# Patient Record
Sex: FEMALE | Race: WHITE | NOT HISPANIC OR LATINO | Employment: FULL TIME | URBAN - NONMETROPOLITAN AREA
[De-identification: names, ages, dates, MRNs, and addresses within clinical notes are randomized per-mention and may not be internally consistent; named-entity substitution may affect disease eponyms.]

---

## 2023-02-09 PROBLEM — Z86.010 PERSONAL HISTORY OF COLONIC POLYPS: Status: ACTIVE | Noted: 2023-02-09

## 2023-02-09 PROBLEM — G89.29 NECK PAIN, CHRONIC: Status: ACTIVE | Noted: 2023-02-09

## 2023-02-09 PROBLEM — M19.90 DJD (DEGENERATIVE JOINT DISEASE): Status: ACTIVE | Noted: 2023-02-09

## 2023-02-09 PROBLEM — R06.83 SNORING: Status: ACTIVE | Noted: 2023-02-09

## 2023-02-09 PROBLEM — G43.909 MIGRAINE: Status: ACTIVE | Noted: 2023-02-09

## 2023-02-09 PROBLEM — L30.9 ECZEMA: Status: ACTIVE | Noted: 2023-02-09

## 2023-02-09 PROBLEM — R69 TAKING MEDICATION FOR CHRONIC DISEASE: Status: ACTIVE | Noted: 2023-02-09

## 2023-02-09 PROBLEM — Z78.0 MENOPAUSE: Status: ACTIVE | Noted: 2023-02-09

## 2023-02-09 PROBLEM — E66.01 CLASS 2 SEVERE OBESITY WITH SERIOUS COMORBIDITY AND BODY MASS INDEX (BMI) OF 39.0 TO 39.9 IN ADULT (MULTI): Status: ACTIVE | Noted: 2023-02-09

## 2023-02-09 PROBLEM — G47.34 NOCTURNAL HYPOXEMIA: Status: ACTIVE | Noted: 2023-02-09

## 2023-02-09 PROBLEM — R73.9 HYPERGLYCEMIA: Status: ACTIVE | Noted: 2023-02-09

## 2023-02-09 PROBLEM — E66.812 CLASS 2 SEVERE OBESITY WITH SERIOUS COMORBIDITY AND BODY MASS INDEX (BMI) OF 39.0 TO 39.9 IN ADULT: Status: ACTIVE | Noted: 2023-02-09

## 2023-02-09 PROBLEM — N92.6 MENSTRUAL IRREGULARITY: Status: ACTIVE | Noted: 2023-02-09

## 2023-02-09 PROBLEM — E78.2 MIXED HYPERLIPIDEMIA: Status: ACTIVE | Noted: 2023-02-09

## 2023-02-09 PROBLEM — F32.0 DEPRESSION, MAJOR, SINGLE EPISODE, MILD (CMS-HCC): Status: ACTIVE | Noted: 2023-02-09

## 2023-02-09 PROBLEM — Z86.0100 PERSONAL HISTORY OF COLONIC POLYPS: Status: ACTIVE | Noted: 2023-02-09

## 2023-02-09 PROBLEM — R53.83 FATIGUE: Status: ACTIVE | Noted: 2023-02-09

## 2023-02-09 PROBLEM — M54.2 NECK PAIN, CHRONIC: Status: ACTIVE | Noted: 2023-02-09

## 2023-02-09 PROBLEM — R74.8 ELEVATED ALKALINE PHOSPHATASE LEVEL: Status: ACTIVE | Noted: 2023-02-09

## 2023-02-09 RX ORDER — SERTRALINE HYDROCHLORIDE 100 MG/1
100 TABLET, FILM COATED ORAL DAILY
COMMUNITY
End: 2023-07-10

## 2023-02-09 RX ORDER — SUMATRIPTAN 50 MG/1
50 TABLET, FILM COATED ORAL
COMMUNITY
End: 2024-02-07 | Stop reason: SDUPTHER

## 2023-02-09 RX ORDER — LEVONORGESTREL AND ETHINYL ESTRADIOL 100-20(84)
KIT ORAL DAILY
COMMUNITY
Start: 2022-04-08 | End: 2024-02-07 | Stop reason: WASHOUT

## 2023-02-09 RX ORDER — ATORVASTATIN CALCIUM 20 MG/1
20 TABLET, FILM COATED ORAL DAILY
COMMUNITY
Start: 2022-08-03 | End: 2023-07-10 | Stop reason: SDUPTHER

## 2023-03-31 LAB
ANION GAP IN SER/PLAS: 10 MMOL/L (ref 10–20)
CALCIUM (MG/DL) IN SER/PLAS: 9.2 MG/DL (ref 8.6–10.3)
CARBON DIOXIDE, TOTAL (MMOL/L) IN SER/PLAS: 27 MMOL/L (ref 21–32)
CHLORIDE (MMOL/L) IN SER/PLAS: 104 MMOL/L (ref 98–107)
CREATININE (MG/DL) IN SER/PLAS: 0.87 MG/DL (ref 0.5–1.05)
ESTIMATED AVERAGE GLUCOSE FOR HBA1C: 111 MG/DL
GFR FEMALE: 79 ML/MIN/1.73M2
GLUCOSE (MG/DL) IN SER/PLAS: 84 MG/DL (ref 74–99)
HEMOGLOBIN A1C/HEMOGLOBIN TOTAL IN BLOOD: 5.5 %
POTASSIUM (MMOL/L) IN SER/PLAS: 4.2 MMOL/L (ref 3.5–5.3)
SODIUM (MMOL/L) IN SER/PLAS: 137 MMOL/L (ref 136–145)
UREA NITROGEN (MG/DL) IN SER/PLAS: 21 MG/DL (ref 6–23)

## 2023-04-05 ENCOUNTER — OFFICE VISIT (OUTPATIENT)
Dept: PRIMARY CARE | Facility: CLINIC | Age: 53
End: 2023-04-05
Payer: COMMERCIAL

## 2023-04-05 VITALS
SYSTOLIC BLOOD PRESSURE: 136 MMHG | OXYGEN SATURATION: 97 % | HEIGHT: 63 IN | WEIGHT: 231 LBS | BODY MASS INDEX: 40.93 KG/M2 | DIASTOLIC BLOOD PRESSURE: 72 MMHG | HEART RATE: 84 BPM

## 2023-04-05 DIAGNOSIS — F32.0 DEPRESSION, MAJOR, SINGLE EPISODE, MILD (CMS-HCC): ICD-10-CM

## 2023-04-05 DIAGNOSIS — E66.01 CLASS 3 SEVERE OBESITY DUE TO EXCESS CALORIES WITHOUT SERIOUS COMORBIDITY WITH BODY MASS INDEX (BMI) OF 40.0 TO 44.9 IN ADULT (MULTI): ICD-10-CM

## 2023-04-05 DIAGNOSIS — Z12.31 ENCOUNTER FOR SCREENING MAMMOGRAM FOR MALIGNANT NEOPLASM OF BREAST: Primary | ICD-10-CM

## 2023-04-05 DIAGNOSIS — R73.9 HYPERGLYCEMIA: ICD-10-CM

## 2023-04-05 DIAGNOSIS — L30.9 ECZEMA, UNSPECIFIED TYPE: ICD-10-CM

## 2023-04-05 PROBLEM — Z78.0 MENOPAUSE: Status: RESOLVED | Noted: 2023-02-09 | Resolved: 2023-04-05

## 2023-04-05 PROBLEM — M19.90 DJD (DEGENERATIVE JOINT DISEASE): Status: RESOLVED | Noted: 2023-02-09 | Resolved: 2023-04-05

## 2023-04-05 PROBLEM — E66.812 CLASS 2 SEVERE OBESITY WITH SERIOUS COMORBIDITY AND BODY MASS INDEX (BMI) OF 39.0 TO 39.9 IN ADULT: Status: RESOLVED | Noted: 2023-02-09 | Resolved: 2023-04-05

## 2023-04-05 PROBLEM — M54.2 NECK PAIN, CHRONIC: Status: RESOLVED | Noted: 2023-02-09 | Resolved: 2023-04-05

## 2023-04-05 PROBLEM — N92.6 MENSTRUAL IRREGULARITY: Status: RESOLVED | Noted: 2023-02-09 | Resolved: 2023-04-05

## 2023-04-05 PROBLEM — R74.8 ELEVATED ALKALINE PHOSPHATASE LEVEL: Status: RESOLVED | Noted: 2023-02-09 | Resolved: 2023-04-05

## 2023-04-05 PROBLEM — G89.29 NECK PAIN, CHRONIC: Status: RESOLVED | Noted: 2023-02-09 | Resolved: 2023-04-05

## 2023-04-05 PROBLEM — E66.813 CLASS 3 SEVERE OBESITY DUE TO EXCESS CALORIES WITHOUT SERIOUS COMORBIDITY WITH BODY MASS INDEX (BMI) OF 40.0 TO 44.9 IN ADULT: Status: ACTIVE | Noted: 2023-02-09

## 2023-04-05 PROCEDURE — 1036F TOBACCO NON-USER: CPT | Performed by: INTERNAL MEDICINE

## 2023-04-05 PROCEDURE — 99214 OFFICE O/P EST MOD 30 MIN: CPT | Performed by: INTERNAL MEDICINE

## 2023-04-05 PROCEDURE — 3008F BODY MASS INDEX DOCD: CPT | Performed by: INTERNAL MEDICINE

## 2023-04-05 RX ORDER — PIMECROLIMUS 10 MG/G
CREAM TOPICAL 2 TIMES DAILY
Qty: 30 G | Refills: 2 | Status: SHIPPED | OUTPATIENT
Start: 2023-04-05 | End: 2023-05-05

## 2023-04-05 ASSESSMENT — ENCOUNTER SYMPTOMS
FATIGUE: 0
COUGH: 0
ARTHRALGIAS: 0
BACK PAIN: 0
ABDOMINAL PAIN: 0
WHEEZING: 0
NAUSEA: 0
BLOOD IN STOOL: 0
DIARRHEA: 0
CHEST TIGHTNESS: 0
SHORTNESS OF BREATH: 0
PALPITATIONS: 0
VOMITING: 0

## 2023-04-05 NOTE — ASSESSMENT & PLAN NOTE
-She will work onhealthy diet with weight loss and I have specifically recommended weight watchers.

## 2023-04-05 NOTE — ASSESSMENT & PLAN NOTE
-Her last colonoscopy was performed on 4- and she did have a couple of polyps.  She will definitely need to have a follow-up colonoscopy next year

## 2023-04-05 NOTE — PATIENT INSTRUCTIONS
-Overall I am very pleased with your laboratory test results today  -Please remember to send us a copy of your recent comprehensive lab profile and we will put it in your file  -We are going to help you get your screening mammogram scheduled  -Please call me if the cream I sent in today is too expensive or if it is ineffective  -Please try to check your blood pressure at home when you have the opportunity to sit and relax and give us an update after several weeks.  We generally recommend the blood pressure monitor called Omron and we like the arm cuffs as opposed to the wrist cuffs  -If everything goes according to plan I will see you back in 6 months for reevaluation and please remember to get your lab work done just prior to that visit.  I will not be ordering a cholesterol profile since you had it recently

## 2023-04-05 NOTE — PROGRESS NOTES
"Subjective   Patient ID: Rebecca Friedman is a 52 y.o. female who presents for routine 6 mo check up; lab and med review; c/o eczema and OTC creams are not working.    HPI    Review of Systems    Objective   Vitals:    04/05/23 0707   BP: 142/86   BP Location: Right arm   Pulse: 84   SpO2: 97%   Weight: 105 kg (231 lb)   Height: 1.6 m (5' 3\")      Physical Exam    Assessment/Plan   There are no diagnoses linked to this encounter.      "

## 2023-04-05 NOTE — ASSESSMENT & PLAN NOTE
-I have prescribed a topical cream called Elidel.  She will let me know if its not affordable and also let us know if its effective.  -We did discuss a hypoallergenic regimen which includes avoiding fabric softeners and dryer sheets.  Using hypoallergenic detergent such as Dreft and doing the double rinse cycle.  Also avoiding lotions or perfumes.  Also using mild soap such as Dove sensitive soap

## 2023-04-05 NOTE — ASSESSMENT & PLAN NOTE
-Her most recent blood sugar is normal with a hemoglobin A1c of 5.5.  We will continue to check periodically

## 2023-04-05 NOTE — PROGRESS NOTES
Subjective   Patient ID: Rebecca Friedman is a 52 y.o. female who presents for No chief complaint on file..  HPI  She is here today for her 6-month checkup.  She is looking well and reports feeling well.  When she arrived her blood pressure was a bit generous but after sitting for a while it did come down significantly.  We talked about doing some ambulatory monitoring to make sure that her blood pressure is under relatively good control.  She also suffers with eczema and has had it for many years.  She has tried various over the counter topical agents such as hydrocortisone but it does not seem to help.  Her condition comes and goes.  We talked about conducting a hypoallergenic regimen and I will give her a prescription cream today.  She will let us know if is not affordable or if its not effective.  She also states she is doing relatively well with her sertraline although she is having a lot of stress recently because of a new job position.  We also went over the results of recent lab work and overall I am very pleased with her numbers.  She states she had a comprehensive lab panel from her workplace recently and her cholesterol was very good at that time.  She has agreed to give us a copy and again we will check her cholesterol periodically.  We also discussed her history of colon polyps.  Her last colonoscopy was in 2019 and she will definitely need to have a colonoscopy next year as part of her surveillance.  She is also agreeable to getting her mammogram scheduled.  Overall she appears to be doing well and she will be working on healthy diet with weight loss.  Review of Systems   Constitutional:  Negative for fatigue.   Respiratory:  Negative for cough, chest tightness, shortness of breath and wheezing.    Cardiovascular:  Negative for chest pain, palpitations and leg swelling.   Gastrointestinal:  Negative for abdominal pain, blood in stool, diarrhea, nausea and vomiting.   Musculoskeletal:  Negative for  arthralgias and back pain.   Objective   Physical Exam  Vitals and nursing note reviewed.   Constitutional:       General: She is not in acute distress.     Appearance: Normal appearance.   HENT:      Head: Normocephalic and atraumatic.   Eyes:      Conjunctiva/sclera: Conjunctivae normal.   Cardiovascular:      Rate and Rhythm: Normal rate and regular rhythm.      Heart sounds: Normal heart sounds.   Pulmonary:      Effort: No respiratory distress.      Breath sounds: No wheezing.   Abdominal:      Palpations: Abdomen is soft.      Tenderness: There is no abdominal tenderness. There is no guarding.   Musculoskeletal:         General: No swelling. Normal range of motion.   Skin:     General: Skin is warm and dry.   Neurological:      General: No focal deficit present.      Mental Status: She is alert and oriented to person, place, and time.   Psychiatric:         Behavior: Behavior normal.       Recent Results (from the past 672 hour(s))   Hemoglobin A1C    Collection Time: 03/31/23  7:05 AM   Result Value Ref Range    Hemoglobin A1C 5.5 %    Estimated Average Glucose 111 MG/DL   Basic Metabolic Panel    Collection Time: 03/31/23  7:05 AM   Result Value Ref Range    Glucose 84 74 - 99 mg/dL    Sodium 137 136 - 145 mmol/L    Potassium 4.2 3.5 - 5.3 mmol/L    Chloride 104 98 - 107 mmol/L    Bicarbonate 27 21 - 32 mmol/L    Anion Gap 10 10 - 20 mmol/L    Urea Nitrogen 21 6 - 23 mg/dL    Creatinine 0.87 0.50 - 1.05 mg/dL    GFR Female 79 >90 mL/min/1.73m2    Calcium 9.2 8.6 - 10.3 mg/dL       Assessment/Plan   Problem List Items Addressed This Visit          Endocrine/Metabolic    Class 3 severe obesity due to excess calories without serious comorbidity with body mass index (BMI) of 40.0 to 44.9 in adult (CMS/[facility])     -She will work onhealthy diet with weight loss and I have specifically recommended weight watchers.         Relevant Orders    Follow Up In Primary Care       Infectious/Inflammatory    Eczema     -I have  prescribed a topical cream called Elidel.  She will let me know if its not affordable and also let us know if its effective.  -We did discuss a hypoallergenic regimen which includes avoiding fabric softeners and dryer sheets.  Using hypoallergenic detergent such as Dreft and doing the double rinse cycle.  Also avoiding lotions or perfumes.  Also using mild soap such as Dove sensitive soap         Relevant Medications    pimecrolimus (Elidel) 1 % cream    Other Relevant Orders    Follow Up In Primary Care       Other    Depression, major, single episode, mild (CMS/HCC)     -Doing well on her sertraline 100 mg daily         Relevant Orders    Follow Up In Primary Care    Hyperglycemia     -Her most recent blood sugar is normal with a hemoglobin A1c of 5.5.  We will continue to check periodically         Relevant Orders    Follow Up In Primary Care    Basic Metabolic Panel    Hemoglobin A1c     Other Visit Diagnoses       Encounter for screening mammogram for malignant neoplasm of breast    -  Primary    Relevant Orders    BI mammo bilateral screening tomosynthesis               Sandy Huff, DO

## 2023-04-05 NOTE — ASSESSMENT & PLAN NOTE
-She will give us a copy of her most recent comprehensive lab panel through the workplace which includes cholesterol  -She will continue with atorvastatin 20 mg daily

## 2023-05-16 LAB
FOLLITROPIN (IU/L) IN SER/PLAS: 34.8 IU/L
LUTEINIZING HORMONE (IU/ML) IN SER/PLAS: 25.6 IU/L

## 2023-05-19 LAB — GENITAL CULTURE: NORMAL

## 2023-06-07 ENCOUNTER — LAB (OUTPATIENT)
Dept: LAB | Facility: LAB | Age: 53
End: 2023-06-07
Payer: COMMERCIAL

## 2023-06-07 ENCOUNTER — OFFICE VISIT (OUTPATIENT)
Dept: PRIMARY CARE | Facility: CLINIC | Age: 53
End: 2023-06-07
Payer: COMMERCIAL

## 2023-06-07 VITALS
WEIGHT: 231 LBS | DIASTOLIC BLOOD PRESSURE: 80 MMHG | HEART RATE: 100 BPM | BODY MASS INDEX: 40.93 KG/M2 | HEIGHT: 63 IN | OXYGEN SATURATION: 98 % | SYSTOLIC BLOOD PRESSURE: 126 MMHG

## 2023-06-07 DIAGNOSIS — F41.9 ANXIETY: ICD-10-CM

## 2023-06-07 DIAGNOSIS — F32.0 DEPRESSION, MAJOR, SINGLE EPISODE, MILD (CMS-HCC): Primary | ICD-10-CM

## 2023-06-07 LAB — THYROTROPIN (MIU/L) IN SER/PLAS BY DETECTION LIMIT <= 0.05 MIU/L: 3.85 MIU/L (ref 0.44–3.98)

## 2023-06-07 PROCEDURE — 99213 OFFICE O/P EST LOW 20 MIN: CPT | Performed by: INTERNAL MEDICINE

## 2023-06-07 PROCEDURE — 3008F BODY MASS INDEX DOCD: CPT | Performed by: INTERNAL MEDICINE

## 2023-06-07 PROCEDURE — 36415 COLL VENOUS BLD VENIPUNCTURE: CPT

## 2023-06-07 PROCEDURE — 84443 ASSAY THYROID STIM HORMONE: CPT

## 2023-06-07 PROCEDURE — 1036F TOBACCO NON-USER: CPT | Performed by: INTERNAL MEDICINE

## 2023-06-07 RX ORDER — ARIPIPRAZOLE 2 MG/1
2 TABLET ORAL DAILY
Qty: 30 TABLET | Refills: 0 | Status: SHIPPED | OUTPATIENT
Start: 2023-06-07 | End: 2023-07-10 | Stop reason: SDUPTHER

## 2023-06-07 RX ORDER — HYDROXYZINE HYDROCHLORIDE 25 MG/1
25 TABLET, FILM COATED ORAL 3 TIMES DAILY
Qty: 30 TABLET | Refills: 2 | Status: SHIPPED | OUTPATIENT
Start: 2023-06-07 | End: 2024-02-07 | Stop reason: WASHOUT

## 2023-06-07 ASSESSMENT — ENCOUNTER SYMPTOMS
PALPITATIONS: 1
SHORTNESS OF BREATH: 0
BLOOD IN STOOL: 0
FATIGUE: 0
DIARRHEA: 0
WHEEZING: 0
VOMITING: 0
NAUSEA: 0
ABDOMINAL PAIN: 0
COUGH: 0
CHEST TIGHTNESS: 0

## 2023-06-07 NOTE — PROGRESS NOTES
Subjective   Patient ID: Rebecca Friedman is a 53 y.o. female who presents for No chief complaint on file..  HPI  She is here today to discuss some increased stress in her life as of recent.  Patient states she is having a lot of stress at work and even some stress at home.  She has been feeling more depressed and also experiencing some anxiety.  She states she has not had a full-blown panic attack but sometimes she is feeling anxious and experiences palpitations.  She states she is working on some conflict resolution at work and she feels optimistic that things will change but again she is just not feeling well.  She states she has an appointment to get into  a counselor that is covered through her insurance plan but it is going to take a long time to get in because of their heavy schedule.  I did suggest that she possibly talk to her insurance company about this issue and they might allow her to see somebody different.  In the meantime we discussed her Zoloft which she has been taking for the past 15 years.  She has been on 200 mg for a very long time.  We discussed adding a medication called Abilify as often times this helps with the efficacy of her current antidepressant and may allow us not to have to switch medicines.  We also talked about the anxiety and I will be giving her hydroxyzine to take.  We talked about side effects and she will try a dose when she is at home away from work to see how she responds to it.  We also talked about checking a thyroid blood test just to make sure there is no abnormalities.  We will plan on seeing her back in 3 to 4 weeks for follow-up and of course she will call sooner if things or not going well.  Review of Systems   Constitutional:  Negative for fatigue.   Respiratory:  Negative for cough, chest tightness, shortness of breath and wheezing.    Cardiovascular:  Positive for palpitations. Negative for chest pain and leg swelling.   Gastrointestinal:  Negative for  abdominal pain, blood in stool, diarrhea, nausea and vomiting.     Objective   Physical Exam  Vitals and nursing note reviewed.   Constitutional:       General: She is not in acute distress.     Appearance: Normal appearance.   HENT:      Head: Normocephalic and atraumatic.   Eyes:      Conjunctiva/sclera: Conjunctivae normal.   Cardiovascular:      Rate and Rhythm: Normal rate and regular rhythm.      Heart sounds: Normal heart sounds.   Pulmonary:      Effort: No respiratory distress.      Breath sounds: No wheezing.   Abdominal:      Palpations: Abdomen is soft.      Tenderness: There is no abdominal tenderness. There is no guarding.   Musculoskeletal:         General: No swelling. Normal range of motion.   Skin:     General: Skin is warm and dry.   Neurological:      General: No focal deficit present.      Mental Status: She is alert and oriented to person, place, and time.   Psychiatric:         Behavior: Behavior normal.       Assessment/Plan   Problem List Items Addressed This Visit          Other    Depression, major, single episode, mild (CMS/HCC) - Primary     -She will continue with sertraline 200 mg daily (she has been on this dose for the past 15 years)  -I am adding Abilify 2 mg 1 tablet daily  -We will check a TSH and I will contact her with results when it comes back  -I would like to see her back in 3 to 4 weeks for follow-up         Relevant Medications    ARIPiprazole (Abilify) 2 mg tablet    Anxiety     -She understands that often times depression and anxiety go hand-in-hand so hopefully by improving the depression and anxiety will improve  -She will try to get into a counselor as quickly as possible  -I am giving her hydroxyzine to use as needed for anxiety         Relevant Medications    hydrOXYzine HCL (Atarax) 25 mg tablet    Other Relevant Orders    TSH          Sandy Huff, DO

## 2023-06-07 NOTE — ASSESSMENT & PLAN NOTE
-She will continue with sertraline 200 mg daily (she has been on this dose for the past 15 years)  -I am adding Abilify 2 mg 1 tablet daily  -We will check a TSH and I will contact her with results when it comes back  -I would like to see her back in 3 to 4 weeks for follow-up

## 2023-06-07 NOTE — PATIENT INSTRUCTIONS
As we discussed you will get your thyroid blood test drawn today before leaving and we will contact you with results  I sent 2 prescriptions to your pharmacy.  1 is called Abilify to be taken once a day with your Zoloft.  This medication has been shown to help with depression and can often times provide significant relief  The hydroxyzine is for anxiety and will be taken only as needed.  I suggest you try a dose while at home to see how you respond as far as side effects are concerned  I am very pleased that you are trying to get into the counselor and you might want to call insurance about the issue with getting an appointment

## 2023-06-07 NOTE — ASSESSMENT & PLAN NOTE
-She understands that often times depression and anxiety go hand-in-hand so hopefully by improving the depression and anxiety will improve  -She will try to get into a counselor as quickly as possible  -I am giving her hydroxyzine to use as needed for anxiety

## 2023-06-26 DIAGNOSIS — E78.2 MIXED HYPERLIPIDEMIA: ICD-10-CM

## 2023-06-26 DIAGNOSIS — F32.0 DEPRESSION, MAJOR, SINGLE EPISODE, MILD (CMS-HCC): ICD-10-CM

## 2023-06-26 DIAGNOSIS — F41.9 ANXIETY: Primary | ICD-10-CM

## 2023-07-03 ENCOUNTER — APPOINTMENT (OUTPATIENT)
Dept: PRIMARY CARE | Facility: CLINIC | Age: 53
End: 2023-07-03
Payer: COMMERCIAL

## 2023-07-10 RX ORDER — ARIPIPRAZOLE 2 MG/1
2 TABLET ORAL DAILY
Qty: 30 TABLET | Refills: 0 | Status: SHIPPED | OUTPATIENT
Start: 2023-07-10 | End: 2023-08-09 | Stop reason: SDUPTHER

## 2023-07-10 RX ORDER — SERTRALINE HYDROCHLORIDE 100 MG/1
TABLET, FILM COATED ORAL
Qty: 60 TABLET | Refills: 0 | Status: SHIPPED | OUTPATIENT
Start: 2023-07-10 | End: 2024-02-07 | Stop reason: SDUPTHER

## 2023-07-10 RX ORDER — ATORVASTATIN CALCIUM 20 MG/1
20 TABLET, FILM COATED ORAL DAILY
Qty: 30 TABLET | Refills: 0 | Status: SHIPPED | OUTPATIENT
Start: 2023-07-10 | End: 2023-08-09 | Stop reason: SDUPTHER

## 2023-07-10 NOTE — TELEPHONE ENCOUNTER
PT IS OUT OF TOWN FOR FAMILY EMERGENCY AND NEEDS HER MEDICATIONS. SHE WILL SCHEDULE APPOINTMENT WHEN SHE RETURNS   show

## 2023-07-11 ENCOUNTER — APPOINTMENT (OUTPATIENT)
Dept: PRIMARY CARE | Facility: CLINIC | Age: 53
End: 2023-07-11
Payer: COMMERCIAL

## 2023-08-09 ENCOUNTER — OFFICE VISIT (OUTPATIENT)
Dept: PRIMARY CARE | Facility: CLINIC | Age: 53
End: 2023-08-09
Payer: COMMERCIAL

## 2023-08-09 VITALS
DIASTOLIC BLOOD PRESSURE: 72 MMHG | BODY MASS INDEX: 42.17 KG/M2 | HEIGHT: 63 IN | HEART RATE: 74 BPM | WEIGHT: 238 LBS | SYSTOLIC BLOOD PRESSURE: 124 MMHG

## 2023-08-09 DIAGNOSIS — E78.2 MIXED HYPERLIPIDEMIA: ICD-10-CM

## 2023-08-09 DIAGNOSIS — F32.0 DEPRESSION, MAJOR, SINGLE EPISODE, MILD (CMS-HCC): ICD-10-CM

## 2023-08-09 DIAGNOSIS — R73.9 HYPERGLYCEMIA: Primary | ICD-10-CM

## 2023-08-09 DIAGNOSIS — N95.0 POSTMENOPAUSAL BLEEDING: ICD-10-CM

## 2023-08-09 PROBLEM — R53.83 FATIGUE: Status: RESOLVED | Noted: 2023-02-09 | Resolved: 2023-08-09

## 2023-08-09 PROCEDURE — 3008F BODY MASS INDEX DOCD: CPT | Performed by: INTERNAL MEDICINE

## 2023-08-09 PROCEDURE — 99213 OFFICE O/P EST LOW 20 MIN: CPT | Performed by: INTERNAL MEDICINE

## 2023-08-09 PROCEDURE — 1036F TOBACCO NON-USER: CPT | Performed by: INTERNAL MEDICINE

## 2023-08-09 RX ORDER — ATORVASTATIN CALCIUM 20 MG/1
20 TABLET, FILM COATED ORAL DAILY
Qty: 90 TABLET | Refills: 1 | Status: SHIPPED | OUTPATIENT
Start: 2023-08-09 | End: 2023-08-09 | Stop reason: SDUPTHER

## 2023-08-09 RX ORDER — ARIPIPRAZOLE 2 MG/1
2 TABLET ORAL DAILY
Qty: 90 TABLET | Refills: 1 | Status: SHIPPED | OUTPATIENT
Start: 2023-08-09 | End: 2024-01-30

## 2023-08-09 RX ORDER — ATORVASTATIN CALCIUM 20 MG/1
20 TABLET, FILM COATED ORAL DAILY
Qty: 90 TABLET | Refills: 1 | Status: SHIPPED | OUTPATIENT
Start: 2023-08-09 | End: 2024-01-23

## 2023-08-09 RX ORDER — ARIPIPRAZOLE 2 MG/1
2 TABLET ORAL DAILY
Qty: 30 TABLET | Refills: 0 | Status: SHIPPED | OUTPATIENT
Start: 2023-08-09 | End: 2023-08-09 | Stop reason: SDUPTHER

## 2023-08-09 ASSESSMENT — ENCOUNTER SYMPTOMS
ABDOMINAL PAIN: 0
VOMITING: 0
WHEEZING: 0
NAUSEA: 0
SHORTNESS OF BREATH: 0
BLOOD IN STOOL: 0
ARTHRALGIAS: 0
PALPITATIONS: 0
FATIGUE: 0
BACK PAIN: 0
COUGH: 0
DIARRHEA: 0

## 2023-08-09 NOTE — PROGRESS NOTES
Subjective   Patient ID: Rebecca Friedman is a 53 y.o. female who presents for Follow-up (Med refill).  HPI  She is here today for follow-up.  We last saw her on June 7 and she was having a lot of stress at that time.  We added Abilify to her daily regimen and she is back today for follow-up.  She states life got pretty chaotic because her father was very ill and was out of state.  She states she is doing better and she has signs a 60% improvement from when we saw her last time.  She is also going to counseling and will be going to her second session.  We will continue with her current medical regimen and we are providing refills today.  We also briefly discussed her thyroid blood test which came back entirely normal.  She also explains that she has been in menopause for a little over a year and has oddly experienced 2 menstrual cycles.  I told her that sometimes this can happen however we need to investigate and make sure it is not something more serious.  I am going to order a transvaginal ultrasound and explained this to her.  She will call make an appoint with Dr. Live for a follow-up.  If everything goes according to plan we will see her back in approximately 6 months and she knows to get fasting lab work done prior to that visit  Review of Systems   Constitutional:  Negative for fatigue.   Respiratory:  Negative for cough, shortness of breath and wheezing.    Cardiovascular:  Negative for chest pain, palpitations and leg swelling.   Gastrointestinal:  Negative for abdominal pain, blood in stool, diarrhea, nausea and vomiting.   Musculoskeletal:  Negative for arthralgias and back pain.     Objective   Physical Exam  Vitals and nursing note reviewed.   Constitutional:       General: She is not in acute distress.     Appearance: Normal appearance.   HENT:      Head: Normocephalic and atraumatic.   Eyes:      Conjunctiva/sclera: Conjunctivae normal.   Cardiovascular:      Rate and Rhythm: Normal rate and  regular rhythm.      Heart sounds: Normal heart sounds.   Pulmonary:      Effort: No respiratory distress.      Breath sounds: No wheezing.   Abdominal:      Palpations: Abdomen is soft.      Tenderness: There is no abdominal tenderness. There is no guarding.   Musculoskeletal:         General: No swelling. Normal range of motion.   Skin:     General: Skin is warm and dry.   Neurological:      General: No focal deficit present.      Mental Status: She is alert and oriented to person, place, and time.   Psychiatric:         Behavior: Behavior normal.       Assessment/Plan   Problem List Items Addressed This Visit       Depression, major, single episode, mild (CMS/HCC)     -Doing much better  -We will continue with counseling  -We will continue with sertraline 100 mg 2 tablets daily  -We will continue with Abilify 2 mg daily         Relevant Medications    ARIPiprazole (Abilify) 2 mg tablet    Hyperglycemia - Primary    Relevant Orders    Basic Metabolic Panel    Hemoglobin A1C    Mixed hyperlipidemia    Relevant Medications    atorvastatin (Lipitor) 20 mg tablet    Other Relevant Orders    Lipid panel    Postmenopausal bleeding     -We will order a transvaginal ultrasound and we will contact her with the results  -She has agreed to make an appoint with Dr. Live for follow-up         Relevant Orders    US pelvis transvaginal          Sandy Huff DO

## 2023-08-09 NOTE — PATIENT INSTRUCTIONS
As we discussed the staff will be helping you to schedule a transvaginal ultrasound.  This will allow us to see your uterus and make sure that it is looking normal.  Even if this test comes back normal you still need to see Dr. Live because sometimes ultrasound can miss some minor issues  We have provided refills on medications and if everything goes according to plan we would like to see you back in approximately 6 months.  Just prior to that visit we will be checking your blood sugar and your cholesterol so please remember to go fasting for that lab work prior to your visit  Please also remember to get the season's flu vaccine in the fall

## 2023-08-09 NOTE — ASSESSMENT & PLAN NOTE
-Doing much better  -We will continue with counseling  -We will continue with sertraline 100 mg 2 tablets daily  -We will continue with Abilify 2 mg daily

## 2023-08-09 NOTE — ASSESSMENT & PLAN NOTE
-We will order a transvaginal ultrasound and we will contact her with the results  -She has agreed to make an appoint with Dr. Live for follow-up

## 2023-08-14 NOTE — RESULT ENCOUNTER NOTE
Just an FYI  I called her tonight to let her know that the ultrasound did show a borderline thickened endometrial canal and the good news is she has an appointment with gynecology on Friday for her postmenopausal bleeding.

## 2023-08-30 LAB
FOLLITROPIN (IU/L) IN SER/PLAS: 28.8 IU/L
LUTEINIZING HORMONE (IU/ML) IN SER/PLAS: 20.6 IU/L

## 2024-01-04 ENCOUNTER — OFFICE VISIT (OUTPATIENT)
Dept: PRIMARY CARE | Facility: CLINIC | Age: 54
End: 2024-01-04
Payer: COMMERCIAL

## 2024-01-04 ENCOUNTER — ANCILLARY PROCEDURE (OUTPATIENT)
Dept: RADIOLOGY | Facility: CLINIC | Age: 54
End: 2024-01-04
Payer: COMMERCIAL

## 2024-01-04 VITALS
HEART RATE: 94 BPM | WEIGHT: 241 LBS | BODY MASS INDEX: 42.69 KG/M2 | DIASTOLIC BLOOD PRESSURE: 76 MMHG | SYSTOLIC BLOOD PRESSURE: 136 MMHG | OXYGEN SATURATION: 97 %

## 2024-01-04 DIAGNOSIS — J20.9 ACUTE BRONCHITIS, UNSPECIFIED ORGANISM: ICD-10-CM

## 2024-01-04 DIAGNOSIS — J20.9 ACUTE BRONCHITIS, UNSPECIFIED ORGANISM: Primary | ICD-10-CM

## 2024-01-04 PROBLEM — L30.9 ECZEMA: Status: RESOLVED | Noted: 2023-02-09 | Resolved: 2024-01-04

## 2024-01-04 PROCEDURE — 99213 OFFICE O/P EST LOW 20 MIN: CPT | Performed by: INTERNAL MEDICINE

## 2024-01-04 PROCEDURE — 3008F BODY MASS INDEX DOCD: CPT | Performed by: INTERNAL MEDICINE

## 2024-01-04 PROCEDURE — 71046 X-RAY EXAM CHEST 2 VIEWS: CPT | Performed by: RADIOLOGY

## 2024-01-04 PROCEDURE — 71046 X-RAY EXAM CHEST 2 VIEWS: CPT

## 2024-01-04 PROCEDURE — 1036F TOBACCO NON-USER: CPT | Performed by: INTERNAL MEDICINE

## 2024-01-04 RX ORDER — ALBUTEROL SULFATE 90 UG/1
2 AEROSOL, METERED RESPIRATORY (INHALATION) EVERY 4 HOURS PRN
Qty: 18 G | Refills: 1 | Status: SHIPPED | OUTPATIENT
Start: 2024-01-04

## 2024-01-04 RX ORDER — AZITHROMYCIN 250 MG/1
TABLET, FILM COATED ORAL
Qty: 6 TABLET | Refills: 0 | Status: SHIPPED | OUTPATIENT
Start: 2024-01-04 | End: 2024-01-09

## 2024-01-04 RX ORDER — METHYLPREDNISOLONE 4 MG/1
TABLET ORAL
Qty: 21 TABLET | Refills: 0 | Status: SHIPPED | OUTPATIENT
Start: 2024-01-04 | End: 2024-01-11

## 2024-01-04 RX ORDER — ALBUTEROL SULFATE 90 UG/1
2 AEROSOL, METERED RESPIRATORY (INHALATION) EVERY 4 HOURS PRN
COMMUNITY
Start: 2023-12-29 | End: 2024-01-04 | Stop reason: SDUPTHER

## 2024-01-04 RX ORDER — BENZONATATE 200 MG/1
200 CAPSULE ORAL 3 TIMES DAILY PRN
COMMUNITY
Start: 2023-12-29 | End: 2024-01-04 | Stop reason: SDUPTHER

## 2024-01-04 RX ORDER — BENZONATATE 200 MG/1
200 CAPSULE ORAL 3 TIMES DAILY PRN
Qty: 20 CAPSULE | Refills: 0 | Status: SHIPPED | OUTPATIENT
Start: 2024-01-04 | End: 2024-02-07 | Stop reason: WASHOUT

## 2024-01-04 ASSESSMENT — ENCOUNTER SYMPTOMS
ARTHRALGIAS: 0
SINUS PRESSURE: 1
FEVER: 0
NAUSEA: 0
VOMITING: 0
SHORTNESS OF BREATH: 1
PALPITATIONS: 0
WHEEZING: 1
SORE THROAT: 0
FATIGUE: 1
DIARRHEA: 0
COUGH: 1
CHEST TIGHTNESS: 1

## 2024-01-04 NOTE — PROGRESS NOTES
Subjective   Patient ID: Rebecca Friedman is a 53 y.o. female who presents for No chief complaint on file..  HPI  She is here today with a 1 week history of respiratory symptoms.  She explains that she was away from home visiting and unfortunately got exposed to a cat.  She is very allergic.  She started developing respiratory symptoms and ended up going to an urgent care in [place].  She was seen on 29 December.  She was prescribed a Medrol Dosepak as well as an inhaler and cough medicine.  She states unfortunately her symptoms have not improved and now she is developing some discolored mucus.  We did conduct a review of systems and based on today's presentation I have decided to order a chest x-ray.  She will go at her earliest convenience and I will contact her with the results.  I am also going to prescribe an antibiotic this time I decided to repeat her Medrol Dosepak.  We will also be given a refill on her albuterol.  She will keep me apprised of how she is doing  Review of Systems   Constitutional:  Positive for fatigue. Negative for fever.   HENT:  Positive for congestion, postnasal drip and sinus pressure. Negative for ear pain and sore throat.    Respiratory:  Positive for cough, chest tightness, shortness of breath and wheezing.    Cardiovascular:  Negative for palpitations and leg swelling.   Gastrointestinal:  Negative for diarrhea, nausea and vomiting.   Musculoskeletal:  Negative for arthralgias.     Objective   Physical Exam  Vitals and nursing note reviewed.   Constitutional:       General: She is not in acute distress.     Appearance: Normal appearance.   HENT:      Head: Normocephalic and atraumatic.   Eyes:      Conjunctiva/sclera: Conjunctivae normal.   Cardiovascular:      Rate and Rhythm: Normal rate and regular rhythm.      Heart sounds: Normal heart sounds.   Pulmonary:      Effort: No respiratory distress.      Breath sounds: No wheezing.   Abdominal:      Palpations: Abdomen is  soft.      Tenderness: There is no abdominal tenderness. There is no guarding.   Musculoskeletal:         General: No swelling. Normal range of motion.   Skin:     General: Skin is warm and dry.   Neurological:      General: No focal deficit present.      Mental Status: She is alert and oriented to person, place, and time.   Psychiatric:         Behavior: Behavior normal.       Assessment/Plan   Problem List Items Addressed This Visit             ICD-10-CM    Acute bronchitis - Primary J20.9     -Symptoms began approximately a week ago Wednesday  -The initial event was felt to be secondary to allergy due to a cat allergy  -She now has developed some symptoms consistent with an infection including the production of discolored mucus  -I am giving her a Z-Javier  -I will have her go for a chest x-ray and have agreed to contact her with results  -We will repeat her Medrol Dosepak and I am also giving her a refill on her cough medicine and inhaler.  She will let me know how she is doing         Relevant Medications    azithromycin (Zithromax) 250 mg tablet    albuterol 90 mcg/actuation inhaler    benzonatate (Tessalon) 200 mg capsule    methylPREDNISolone (Medrol Dospak) 4 mg tablets    Other Relevant Orders    XR chest 2 views          Sandy Huff, DO

## 2024-01-04 NOTE — PATIENT INSTRUCTIONS
As we discussed I have ordered a chest x-ray and please try to go at your earliest convenience.  Once the results are known I will contact you  I am also giving a prescription for a Z-Javier which is an antibiotic.  Although you take the Z-Javier for 5 days please remember that it persist in your system for 10  We also recommend using your albuterol and I also gave you a Medrol Dosepak to take.  Please be sure to take the Medrol with food because sometimes it can irritate the stomach  I am also recommending you take over-the-counter Mucinex twice a day because this helps break up the mucus and mobilize it out of your nasal passages and lungs  I am also recommending rest with fluids and Tylenol for symptomatic relief.  Please contact me if you feel like your condition is worsening as opposed to getting better and please also let me know how you are doing in the days to come

## 2024-01-04 NOTE — ASSESSMENT & PLAN NOTE
-Symptoms began approximately a week ago Wednesday  -The initial event was felt to be secondary to allergy due to a cat allergy  -She now has developed some symptoms consistent with an infection including the production of discolored mucus  -I am giving her a Z-Javier  -I will have her go for a chest x-ray and have agreed to contact her with results  -We will repeat her Medrol Dosepak and I am also giving her a refill on her cough medicine and inhaler.  She will let me know how she is doing

## 2024-01-23 DIAGNOSIS — E78.2 MIXED HYPERLIPIDEMIA: ICD-10-CM

## 2024-01-23 RX ORDER — ATORVASTATIN CALCIUM 20 MG/1
20 TABLET, FILM COATED ORAL DAILY
Qty: 90 TABLET | Refills: 1 | Status: SHIPPED | OUTPATIENT
Start: 2024-01-23 | End: 2024-02-07 | Stop reason: SDUPTHER

## 2024-01-30 DIAGNOSIS — F32.0 DEPRESSION, MAJOR, SINGLE EPISODE, MILD (CMS-HCC): ICD-10-CM

## 2024-01-30 RX ORDER — ARIPIPRAZOLE 2 MG/1
2 TABLET ORAL DAILY
Qty: 90 TABLET | Refills: 1 | Status: SHIPPED | OUTPATIENT
Start: 2024-01-30 | End: 2024-02-07 | Stop reason: SDUPTHER

## 2024-02-01 ENCOUNTER — LAB (OUTPATIENT)
Dept: LAB | Facility: LAB | Age: 54
End: 2024-02-01
Payer: COMMERCIAL

## 2024-02-01 DIAGNOSIS — E78.2 MIXED HYPERLIPIDEMIA: ICD-10-CM

## 2024-02-01 DIAGNOSIS — R73.9 HYPERGLYCEMIA: ICD-10-CM

## 2024-02-01 LAB
ANION GAP SERPL CALC-SCNC: 11 MMOL/L (ref 10–20)
BUN SERPL-MCNC: 19 MG/DL (ref 6–23)
CALCIUM SERPL-MCNC: 9 MG/DL (ref 8.6–10.3)
CHLORIDE SERPL-SCNC: 105 MMOL/L (ref 98–107)
CHOLEST SERPL-MCNC: 212 MG/DL (ref 0–199)
CHOLESTEROL/HDL RATIO: 4.3
CO2 SERPL-SCNC: 27 MMOL/L (ref 21–32)
CREAT SERPL-MCNC: 0.86 MG/DL (ref 0.5–1.05)
EGFRCR SERPLBLD CKD-EPI 2021: 80 ML/MIN/1.73M*2
EST. AVERAGE GLUCOSE BLD GHB EST-MCNC: 111 MG/DL
GLUCOSE SERPL-MCNC: 91 MG/DL (ref 74–99)
HBA1C MFR BLD: 5.5 %
HDLC SERPL-MCNC: 49 MG/DL
LDLC SERPL CALC-MCNC: 133 MG/DL
NON HDL CHOLESTEROL: 163 MG/DL (ref 0–149)
POTASSIUM SERPL-SCNC: 4.3 MMOL/L (ref 3.5–5.3)
SODIUM SERPL-SCNC: 139 MMOL/L (ref 136–145)
TRIGL SERPL-MCNC: 150 MG/DL (ref 0–149)
VLDL: 30 MG/DL (ref 0–40)

## 2024-02-01 PROCEDURE — 36415 COLL VENOUS BLD VENIPUNCTURE: CPT

## 2024-02-01 PROCEDURE — 83036 HEMOGLOBIN GLYCOSYLATED A1C: CPT

## 2024-02-01 PROCEDURE — 80048 BASIC METABOLIC PNL TOTAL CA: CPT

## 2024-02-01 PROCEDURE — 80061 LIPID PANEL: CPT

## 2024-02-07 ENCOUNTER — OFFICE VISIT (OUTPATIENT)
Dept: PRIMARY CARE | Facility: CLINIC | Age: 54
End: 2024-02-07
Payer: COMMERCIAL

## 2024-02-07 VITALS
OXYGEN SATURATION: 98 % | DIASTOLIC BLOOD PRESSURE: 82 MMHG | BODY MASS INDEX: 44.09 KG/M2 | SYSTOLIC BLOOD PRESSURE: 122 MMHG | HEART RATE: 72 BPM | WEIGHT: 248.9 LBS

## 2024-02-07 DIAGNOSIS — R69 TAKING MEDICATION FOR CHRONIC DISEASE: ICD-10-CM

## 2024-02-07 DIAGNOSIS — F41.9 ANXIETY: ICD-10-CM

## 2024-02-07 DIAGNOSIS — N92.6 IRREGULAR BLEEDING: Primary | ICD-10-CM

## 2024-02-07 DIAGNOSIS — G43.809 OTHER MIGRAINE WITHOUT STATUS MIGRAINOSUS, NOT INTRACTABLE: ICD-10-CM

## 2024-02-07 DIAGNOSIS — F32.0 DEPRESSION, MAJOR, SINGLE EPISODE, MILD (CMS-HCC): ICD-10-CM

## 2024-02-07 DIAGNOSIS — E78.2 MIXED HYPERLIPIDEMIA: ICD-10-CM

## 2024-02-07 PROCEDURE — 99214 OFFICE O/P EST MOD 30 MIN: CPT | Performed by: INTERNAL MEDICINE

## 2024-02-07 PROCEDURE — 1036F TOBACCO NON-USER: CPT | Performed by: INTERNAL MEDICINE

## 2024-02-07 PROCEDURE — 3008F BODY MASS INDEX DOCD: CPT | Performed by: INTERNAL MEDICINE

## 2024-02-07 RX ORDER — ARIPIPRAZOLE 2 MG/1
2 TABLET ORAL DAILY
Qty: 90 TABLET | Refills: 1 | Status: SHIPPED | OUTPATIENT
Start: 2024-02-07 | End: 2024-02-15 | Stop reason: SDUPTHER

## 2024-02-07 RX ORDER — SERTRALINE HYDROCHLORIDE 100 MG/1
200 TABLET, FILM COATED ORAL DAILY
Qty: 180 TABLET | Refills: 1 | Status: SHIPPED | OUTPATIENT
Start: 2024-02-07

## 2024-02-07 RX ORDER — ATORVASTATIN CALCIUM 20 MG/1
20 TABLET, FILM COATED ORAL DAILY
Qty: 90 TABLET | Refills: 1 | Status: SHIPPED | OUTPATIENT
Start: 2024-02-07 | End: 2024-02-15 | Stop reason: SDUPTHER

## 2024-02-07 RX ORDER — SUMATRIPTAN 50 MG/1
50 TABLET, FILM COATED ORAL ONCE AS NEEDED
Qty: 9 TABLET | Refills: 1 | Status: SHIPPED | OUTPATIENT
Start: 2024-02-07

## 2024-02-07 ASSESSMENT — ENCOUNTER SYMPTOMS
SHORTNESS OF BREATH: 0
BACK PAIN: 0
PALPITATIONS: 0
FATIGUE: 0
ARTHRALGIAS: 0
ABDOMINAL PAIN: 0
COUGH: 0
NAUSEA: 0
WHEEZING: 0
BLOOD IN STOOL: 0
VOMITING: 0
DIARRHEA: 0

## 2024-02-07 NOTE — ASSESSMENT & PLAN NOTE
-She will continue with her wonderful exercise routine and watch her diet closely  -We decided to check it again in 1 year

## 2024-02-07 NOTE — PATIENT INSTRUCTIONS
Please keep up the great work with your exercise routine  Please check your blood pressure periodically and if you discover that your numbers are running high please let us know  The staff will be helping you to get an appointment with a female gynecologist so that we can make sure everything is okay with your irregular bleeding  I sent refills on all your medications and please let me know if there are any issues  We will see you back in approximately 6 months and I did order a fasting BMP so please try to remember to go just prior to that visit

## 2024-02-07 NOTE — ASSESSMENT & PLAN NOTE
-Doing very well as her migraine headaches are very infrequent and we are providing a refill on her Imitrex

## 2024-02-07 NOTE — ASSESSMENT & PLAN NOTE
-We are referring her to gynecology so that she can continue an evaluation for her menstrual irregularity

## 2024-02-07 NOTE — PROGRESS NOTES
Subjective   Patient ID: Rebecca Friedman is a 53 y.o. female who presents for No chief complaint on file..  HPI  She is here today for her 6-month routine checkup.  She is looking well and also reports feeling well.  We are reminded that we saw her several weeks ago because of the lingering respiratory infection.  Thankfully her symptoms have resolved.  She states she has been trying to walk approximately 40 minutes every day and overall reports feeling well.  We did review her most recent laboratory test results and we discussed her slightly elevated cholesterol.  We talked about possibly increasing the dose of the medication versus monitoring and working on lifestyle modifications.  She would like to go with the latter.  We also discussed her migraine headaches which are doing much better.  She states in the last several months she has only had 1 episode.  We are providing refills on medications today.  We also discussed blood pressure and she will be checking it at home periodically.  We also discussed her issues with her menstrual irregularity.  Unfortunately the gynecologist she was seeing has left town so we will try to get her into somebody new for follow-up.  I will summarize everything in a problem based format.  Review of Systems   Constitutional:  Negative for fatigue.   Respiratory:  Negative for cough, shortness of breath and wheezing.    Cardiovascular:  Negative for chest pain, palpitations and leg swelling.   Gastrointestinal:  Negative for abdominal pain, blood in stool, diarrhea, nausea and vomiting.   Musculoskeletal:  Negative for arthralgias and back pain.     Objective   Physical Exam  Vitals and nursing note reviewed.   Constitutional:       General: She is not in acute distress.     Appearance: Normal appearance.   HENT:      Head: Normocephalic and atraumatic.   Eyes:      Conjunctiva/sclera: Conjunctivae normal.   Cardiovascular:      Rate and Rhythm: Normal rate and regular rhythm.       Heart sounds: Normal heart sounds.   Pulmonary:      Effort: No respiratory distress.      Breath sounds: No wheezing.   Abdominal:      Palpations: Abdomen is soft.      Tenderness: There is no abdominal tenderness. There is no guarding.   Musculoskeletal:         General: No swelling. Normal range of motion.   Skin:     General: Skin is warm and dry.   Neurological:      General: No focal deficit present.      Mental Status: She is alert and oriented to person, place, and time.   Psychiatric:         Behavior: Behavior normal.       Recent Results (from the past 672 hour(s))   Basic Metabolic Panel    Collection Time: 02/01/24  7:05 AM   Result Value Ref Range    Glucose 91 74 - 99 mg/dL    Sodium 139 136 - 145 mmol/L    Potassium 4.3 3.5 - 5.3 mmol/L    Chloride 105 98 - 107 mmol/L    Bicarbonate 27 21 - 32 mmol/L    Anion Gap 11 10 - 20 mmol/L    Urea Nitrogen 19 6 - 23 mg/dL    Creatinine 0.86 0.50 - 1.05 mg/dL    eGFR 80 >60 mL/min/1.73m*2    Calcium 9.0 8.6 - 10.3 mg/dL   Hemoglobin A1c    Collection Time: 02/01/24  7:05 AM   Result Value Ref Range    Hemoglobin A1C 5.5 see below %    Estimated Average Glucose 111 Not Established mg/dL   Lipid panel    Collection Time: 02/01/24  7:05 AM   Result Value Ref Range    Cholesterol 212 (H) 0 - 199 mg/dL    HDL-Cholesterol 49.0 mg/dL    Cholesterol/HDL Ratio 4.3     LDL Calculated 133 (H) <=99 mg/dL    VLDL 30 0 - 40 mg/dL    Triglycerides 150 (H) 0 - 149 mg/dL    Non HDL Cholesterol 163 (H) 0 - 149 mg/dL       Assessment/Plan   Problem List Items Addressed This Visit             ICD-10-CM    Depression, major, single episode, mild (CMS/HCC) F32.0     -Doing very well on the medication and we are providing refills today         Relevant Medications    ARIPiprazole (Abilify) 2 mg tablet    sertraline (Zoloft) 100 mg tablet    Irregular bleeding - Primary N92.6     -We are referring her to gynecology so that she can continue an evaluation for her menstrual  irregularity         Relevant Orders    Referral to Gynecology    Migraine G43.909     -Doing very well as her migraine headaches are very infrequent and we are providing a refill on her Imitrex         Relevant Medications    SUMAtriptan (Imitrex) 50 mg tablet    Mixed hyperlipidemia E78.2     -She will continue with her wonderful exercise routine and watch her diet closely  -We decided to check it again in 1 year         Relevant Medications    atorvastatin (Lipitor) 20 mg tablet    Taking medication for chronic disease R69    Relevant Orders    Basic Metabolic Panel    Anxiety F41.9     -Doing well at this time         Relevant Medications    sertraline (Zoloft) 100 mg tablet          Sandy Huff,

## 2024-02-15 DIAGNOSIS — F32.0 DEPRESSION, MAJOR, SINGLE EPISODE, MILD (CMS-HCC): ICD-10-CM

## 2024-02-15 DIAGNOSIS — E78.2 MIXED HYPERLIPIDEMIA: ICD-10-CM

## 2024-02-15 RX ORDER — ARIPIPRAZOLE 2 MG/1
2 TABLET ORAL DAILY
Qty: 90 TABLET | Refills: 1 | Status: SHIPPED | OUTPATIENT
Start: 2024-02-15

## 2024-02-15 RX ORDER — ATORVASTATIN CALCIUM 20 MG/1
20 TABLET, FILM COATED ORAL DAILY
Qty: 90 TABLET | Refills: 1 | Status: SHIPPED | OUTPATIENT
Start: 2024-02-15

## 2024-07-26 DIAGNOSIS — E78.2 MIXED HYPERLIPIDEMIA: ICD-10-CM

## 2024-07-26 DIAGNOSIS — F32.0 DEPRESSION, MAJOR, SINGLE EPISODE, MILD (CMS-HCC): ICD-10-CM

## 2024-07-29 RX ORDER — ARIPIPRAZOLE 2 MG/1
2 TABLET ORAL DAILY
Qty: 90 TABLET | Refills: 1 | Status: SHIPPED | OUTPATIENT
Start: 2024-07-29

## 2024-07-29 RX ORDER — ATORVASTATIN CALCIUM 20 MG/1
20 TABLET, FILM COATED ORAL DAILY
Qty: 90 TABLET | Refills: 1 | Status: SHIPPED | OUTPATIENT
Start: 2024-07-29

## 2024-08-05 ENCOUNTER — LAB (OUTPATIENT)
Dept: LAB | Facility: LAB | Age: 54
End: 2024-08-05
Payer: COMMERCIAL

## 2024-08-05 DIAGNOSIS — F32.0 DEPRESSION, MAJOR, SINGLE EPISODE, MILD (CMS-HCC): ICD-10-CM

## 2024-08-05 DIAGNOSIS — R69 TAKING MEDICATION FOR CHRONIC DISEASE: ICD-10-CM

## 2024-08-05 DIAGNOSIS — F41.9 ANXIETY: ICD-10-CM

## 2024-08-05 LAB
ANION GAP SERPL CALC-SCNC: 11 MMOL/L (ref 10–20)
BUN SERPL-MCNC: 18 MG/DL (ref 6–23)
CALCIUM SERPL-MCNC: 9.2 MG/DL (ref 8.6–10.3)
CHLORIDE SERPL-SCNC: 106 MMOL/L (ref 98–107)
CO2 SERPL-SCNC: 28 MMOL/L (ref 21–32)
CREAT SERPL-MCNC: 0.91 MG/DL (ref 0.5–1.05)
EGFRCR SERPLBLD CKD-EPI 2021: 74 ML/MIN/1.73M*2
GLUCOSE SERPL-MCNC: 94 MG/DL (ref 74–99)
POTASSIUM SERPL-SCNC: 4.1 MMOL/L (ref 3.5–5.3)
SODIUM SERPL-SCNC: 141 MMOL/L (ref 136–145)

## 2024-08-05 PROCEDURE — 36415 COLL VENOUS BLD VENIPUNCTURE: CPT

## 2024-08-05 PROCEDURE — 80048 BASIC METABOLIC PNL TOTAL CA: CPT

## 2024-08-05 RX ORDER — SERTRALINE HYDROCHLORIDE 100 MG/1
200 TABLET, FILM COATED ORAL DAILY
Qty: 180 TABLET | Refills: 0 | Status: SHIPPED | OUTPATIENT
Start: 2024-08-05 | End: 2024-08-07 | Stop reason: SDUPTHER

## 2024-08-07 ENCOUNTER — APPOINTMENT (OUTPATIENT)
Dept: PRIMARY CARE | Facility: CLINIC | Age: 54
End: 2024-08-07
Payer: COMMERCIAL

## 2024-08-07 VITALS
BODY MASS INDEX: 40.4 KG/M2 | OXYGEN SATURATION: 97 % | DIASTOLIC BLOOD PRESSURE: 80 MMHG | HEART RATE: 78 BPM | SYSTOLIC BLOOD PRESSURE: 126 MMHG | HEIGHT: 63 IN | WEIGHT: 228 LBS

## 2024-08-07 DIAGNOSIS — Z12.31 ENCOUNTER FOR SCREENING MAMMOGRAM FOR MALIGNANT NEOPLASM OF BREAST: Primary | ICD-10-CM

## 2024-08-07 DIAGNOSIS — G43.909 MIGRAINE WITHOUT STATUS MIGRAINOSUS, NOT INTRACTABLE, UNSPECIFIED MIGRAINE TYPE: ICD-10-CM

## 2024-08-07 DIAGNOSIS — R69 TAKING MEDICATION FOR CHRONIC DISEASE: ICD-10-CM

## 2024-08-07 DIAGNOSIS — F41.9 ANXIETY: ICD-10-CM

## 2024-08-07 DIAGNOSIS — E78.2 MIXED HYPERLIPIDEMIA: ICD-10-CM

## 2024-08-07 DIAGNOSIS — N95.0 POSTMENOPAUSAL BLEEDING: ICD-10-CM

## 2024-08-07 DIAGNOSIS — E66.01 CLASS 3 SEVERE OBESITY DUE TO EXCESS CALORIES WITHOUT SERIOUS COMORBIDITY WITH BODY MASS INDEX (BMI) OF 40.0 TO 44.9 IN ADULT (MULTI): ICD-10-CM

## 2024-08-07 DIAGNOSIS — F32.0 DEPRESSION, MAJOR, SINGLE EPISODE, MILD (CMS-HCC): ICD-10-CM

## 2024-08-07 PROBLEM — J20.9 ACUTE BRONCHITIS: Status: RESOLVED | Noted: 2024-01-04 | Resolved: 2024-08-07

## 2024-08-07 PROBLEM — N92.6 IRREGULAR BLEEDING: Status: RESOLVED | Noted: 2023-02-09 | Resolved: 2024-08-07

## 2024-08-07 RX ORDER — ARIPIPRAZOLE 2 MG/1
2 TABLET ORAL DAILY
Qty: 90 TABLET | Refills: 1 | Status: SHIPPED | OUTPATIENT
Start: 2024-08-07

## 2024-08-07 RX ORDER — SERTRALINE HYDROCHLORIDE 100 MG/1
200 TABLET, FILM COATED ORAL DAILY
Qty: 180 TABLET | Refills: 1 | Status: SHIPPED | OUTPATIENT
Start: 2024-08-07

## 2024-08-07 RX ORDER — ATORVASTATIN CALCIUM 20 MG/1
20 TABLET, FILM COATED ORAL DAILY
Qty: 90 TABLET | Refills: 1 | Status: SHIPPED | OUTPATIENT
Start: 2024-08-07

## 2024-08-07 ASSESSMENT — ENCOUNTER SYMPTOMS
DIARRHEA: 0
ARTHRALGIAS: 0
NAUSEA: 0
PALPITATIONS: 0
BACK PAIN: 0
ABDOMINAL PAIN: 0
BLOOD IN STOOL: 0
WHEEZING: 0
VOMITING: 0
CHEST TIGHTNESS: 0
COUGH: 0
FATIGUE: 0
SHORTNESS OF BREATH: 0

## 2024-08-07 ASSESSMENT — PATIENT HEALTH QUESTIONNAIRE - PHQ9
SUM OF ALL RESPONSES TO PHQ9 QUESTIONS 1 AND 2: 0
2. FEELING DOWN, DEPRESSED OR HOPELESS: NOT AT ALL
1. LITTLE INTEREST OR PLEASURE IN DOING THINGS: NOT AT ALL

## 2024-08-07 NOTE — ASSESSMENT & PLAN NOTE
-She reports no further bleeding and she understands that if she sees any spotting or bleeding whatsoever she is to contact me at her earliest convenience for additional evaluation  -She will explore options for gynecologic care as she knows that she needs to get a Pap periodic

## 2024-08-07 NOTE — PROGRESS NOTES
Subjective   Patient ID: Rebecca Friedman is a 54 y.o. female who presents for Follow-up (6 MO FUV).  HPI  She is here today for teen 6-month checkup.  She is looking well and also reports feeling well.  I was very pleased with her blood pressure checkup today and according to our scales she has lost 20 pounds.  She states she has been eating a more sensible diet and increasing her activity levels.  This is a course of fantastic and I encouraged her to keep up the great work.  We did review her most recent laboratory test results and her blood glucose level is completely normal.  We also briefly discussed her migraine headaches and she states she has not had a headache for a very long time.  She is also currently doing well on her medications for anxiety and depression.  We briefly discussed her postmenopausal bleeding and she did have a biopsy which came back negative.  We talked about getting in for a Pap exam it is going in the near future.  She is also due for her screening mammogram we will help get that scheduled today.  I am providing refills on all of her medicines and if everything goes according to plan we will see her back in 6 months for another checkup and she knows to get fasting lab work done prior to that visit.  Review of Systems   Constitutional:  Negative for fatigue.   Respiratory:  Negative for cough, chest tightness, shortness of breath and wheezing.    Cardiovascular:  Negative for chest pain, palpitations and leg swelling.   Gastrointestinal:  Negative for abdominal pain, blood in stool, diarrhea, nausea and vomiting.   Musculoskeletal:  Negative for arthralgias and back pain.     Objective   Physical Exam  Vitals and nursing note reviewed.   Constitutional:       General: She is not in acute distress.     Appearance: Normal appearance.   HENT:      Head: Normocephalic and atraumatic.   Eyes:      Conjunctiva/sclera: Conjunctivae normal.   Cardiovascular:      Rate and Rhythm: Normal rate  and regular rhythm.      Heart sounds: Normal heart sounds.   Pulmonary:      Effort: No respiratory distress.      Breath sounds: No wheezing.   Abdominal:      Palpations: Abdomen is soft.      Tenderness: There is no abdominal tenderness. There is no guarding.   Musculoskeletal:         General: No swelling. Normal range of motion.   Skin:     General: Skin is warm and dry.   Neurological:      General: No focal deficit present.      Mental Status: She is alert and oriented to person, place, and time.   Psychiatric:         Behavior: Behavior normal.       Recent Results (from the past 672 hour(s))   Basic Metabolic Panel    Collection Time: 08/05/24  6:56 AM   Result Value Ref Range    Glucose 94 74 - 99 mg/dL    Sodium 141 136 - 145 mmol/L    Potassium 4.1 3.5 - 5.3 mmol/L    Chloride 106 98 - 107 mmol/L    Bicarbonate 28 21 - 32 mmol/L    Anion Gap 11 10 - 20 mmol/L    Urea Nitrogen 18 6 - 23 mg/dL    Creatinine 0.91 0.50 - 1.05 mg/dL    eGFR 74 >60 mL/min/1.73m*2    Calcium 9.2 8.6 - 10.3 mg/dL       Assessment/Plan   Problem List Items Addressed This Visit             ICD-10-CM    Depression, major, single episode, mild (CMS-HCC) F32.0     -Doing very well on current medical regimen and we are providing refills today         Relevant Medications    ARIPiprazole (Abilify) 2 mg tablet    sertraline (Zoloft) 100 mg tablet    Migraine G43.909     -She has not had a migraine headache for very long         Mixed hyperlipidemia E78.2     -She will continue with atorvastatin and she will get a fasting lipid profile just prior to her next follow-up visit         Relevant Medications    atorvastatin (Lipitor) 20 mg tablet    Other Relevant Orders    Lipid Panel    Taking medication for chronic disease R69    Relevant Orders    Basic Metabolic Panel    Class 3 severe obesity due to excess calories without serious comorbidity with body mass index (BMI) of 40.0 to 44.9 in adult (Multi) E66.01, Z68.41     -She has done a  remarkable job with weight loss since her last visit and I encouraged her to keep up the great work         Anxiety F41.9     -Doing very well on her current medical regimen and we are providing refills today         Relevant Medications    sertraline (Zoloft) 100 mg tablet    Postmenopausal bleeding N95.0     -She reports no further bleeding and she understands that if she sees any spotting or bleeding whatsoever she is to contact me at her earliest convenience for additional evaluation  -She will explore options for gynecologic care as she knows that she needs to get a Pap periodic          Other Visit Diagnoses         Codes    Encounter for screening mammogram for malignant neoplasm of breast    -  Primary Z12.31    Relevant Orders    BI mammo bilateral screening tomosynthesis               Sandy Huff,

## 2024-08-07 NOTE — PATIENT INSTRUCTIONS
As we discussed I am extremely pleased with your checkup today and keep up the great work  I have provided refills on all of your medications for 6 months and please let me know if there is any issue with the pharmacy  Please remember that we would like for you to get your mammogram done at your earliest convenience and when the results are known we will contact you  Please also keep in mind that at some point in the future you will need to see gynecology for Pap  We invite you to return in 6 months.  Please remember to get your flu vaccine this fall and otherwise just prior to your next follow-up visit please remember to go for fasting lab work.

## 2024-08-07 NOTE — ASSESSMENT & PLAN NOTE
-She will continue with atorvastatin and she will get a fasting lipid profile just prior to her next follow-up visit

## 2024-08-07 NOTE — ASSESSMENT & PLAN NOTE
-She has done a remarkable job with weight loss since her last visit and I encouraged her to keep up the great work

## 2024-10-16 NOTE — PCM.HP.BLA
History and Physical
Date of Admission: 10/24/24
HPI: The patient is a 54 year old female presenting for pre-operative visit.
She is scheduled for Hysteroscopy D&C, for AUB
on 10/24/24.   Procedure discussed along with risks, benefits and complications.  Other
alternatives discussed for management. Consent form signed? Yes.  
 
 
PAST MEDICAL HISTORY
PAST MEDICAL HISTORY
Diagnosis Date
? Depression  
? High cholesterol  

  
 
 
PAST SURGICAL HISTORY
PAST SURGICAL HISTORY
Procedure Laterality Date
? APPENDECTOMY   1995
? COLONOSCOPY SCREENING   2019
? EXTRACTION ERUPTED TOOTH/EXR    
  unknown date
? LITHOTRIPSY XTRCORP SHOCK WAVE   2009
  Renal Lithotripsy

 
 
 
CURRENT MEDICATIONS
Current Outpatient Medications
Medication Sig Dispense Refill
? atorvastatin (LIPITOR) 20 mg tablet Take 20 mg by mouth.    
? ARIPiprazole (ABILIFY) 2 mg tablet Take 2 mg by mouth.    
? albuterol HFA (PROVENTIL HFA, VENTOLIN HFA) 90 mcg/actuation inhaler Inhale 2 puffs every 4 hours by inhalation route as needed.    
? sertraline (ZOLOFT) 100 mg tablet Take 200 mg by mouth.    
? Cetirizine 10 mg cap Take 10 mg by mouth.    
 

No current facility-administered medications for this visit.

 
 
ALLERGIES: Patient has no allergy information on record.
 
PERSONAL HISTORY: 
SOCIAL HISTORY
Social History
 

Tobacco Use
? Smoking status: Never
? Smokeless tobacco: Never
Vaping Use
? Vaping status: Never Used
Substance Use Topics
? Alcohol use: Yes
    Comment: rare
? Drug use: Not Currently

  
 
FAMILY HISTORY: 
FAMILY HISTORY
FAMILY HISTORY 
Problem Relation Age of Onset
? Depression Mother  
? Diabetes Mother  
? other (hypercholesterolemia [other]) Mother  
? other (htn) Mother  
? Thyroid Mother  
      Thyroid disease
? Alcohol abuse Father  
? other (cva) Father  
? Ischemic Heart Disease Father  
? Diabetes Father  
? other (hypercholesterolemia [other]) Father  
? other (TIA) Father  
? Parkinson's Father  
? other (hypercholesterolemia [other]) Brother  
? Brain Cancer Maternal Grandmother  
? other (cardiac disorder) Maternal Grandfather  
? other (htn) Maternal Grandfather  
? other (CVA) Maternal Grandfather  
? other (Cardiac disorder) Paternal Grandfather  
? other (htn) Paternal Grandfather  
? Diabetes Paternal Grandfather  

 
 
REVIEW OF SYMPTOMS:
GENERAL: denies fevers or chills
ENDOCRINOLOGY: has not been on steroids
Cardiology : denies palpitations or chest pain
Respiratory: denies SOB or cough
Hematology: denies history of prolonged bleeding or easy bruising or VTE
Allergy: Denies history of personal or family history of allergy to anesthesia
 
 
PHYSICAL EXAMINATION:
 
VITALS: Last menstrual period 09/15/2024.
 
GENERAL:  The patient is well nourished, well hydrated in no acute distress.  , The patient is oriented to time, place, and person.
WET PREP: Not indicated
 
IMPRESSION: PMB, thickened endometrium
 
PLAN:   The risks/benefits/alternatives and personal involved for the planned hysteroscopy D&C were reviewed with the patient. Her questions were answered to her satisfaction and she desires to proceed.  Consent was signed.  I reviewed with her 
postop instructions and expectations.
 
 
I have reviewed and updated past medical and surgical history, medications and allergies 


Assessment & Plan
Assessment/Plan
(1) PMB (postmenopausal bleeding): 
(2) Endometrial thickening on ultrasound:

## 2024-10-18 LAB
ALANINE AMINOTRANSFER ALT/SGPT: 34 U/L (ref 13–56)
ALBUMIN SERPL-MCNC: 3.4 G/DL (ref 3.2–5)
ALKALINE PHOSPHATASE: 150 U/L (ref 45–117)
ANION GAP: 5 (ref 5–15)
AST(SGOT): 25 U/L (ref 15–37)
BUN SERPL-MCNC: 13 MG/DL (ref 7–18)
BUN/CREAT RATIO: 17.3 RATIO (ref 10–20)
CALCIUM SERPL-MCNC: 8.9 MG/DL (ref 8.5–10.1)
CARBON DIOXIDE: 27 MMOL/L (ref 21–32)
CHLORIDE: 106 MMOL/L (ref 98–107)
DEPRECATED RDW RBC: 39.2 FL (ref 35.1–43.9)
ERYTHROCYTE [DISTWIDTH] IN BLOOD: 12.6 % (ref 11.6–14.6)
EST GLOM FILT RATE - AFR AMER: 102 ML/MIN (ref 60–?)
FOLLICLE STIMULATING HORMONE: 14.9 MIU/ML
GLOBULIN: 3.9 G/DL (ref 2.2–4.2)
GLUCOSE: 82 MG/DL (ref 74–106)
HCT VFR BLD AUTO: 42.6 % (ref 37–47)
HEMOGLOBIN: 13.6 G/DL (ref 12–15)
HGB BLD-MCNC: 13.6 G/DL (ref 12–15)
INTERNAL QC VALIDATED?: (no result)
MCV RBC: 85.2 FL (ref 81–99)
MEAN CORP HGB CONC: 31.9 G/DL (ref 32–36)
MEAN PLATELET VOL.: 9.5 FL (ref 6.2–12)
PLATELET # BLD: 317 K/MM3 (ref 150–450)
PLATELET COUNT: 317 K/MM3 (ref 150–450)
POTASSIUM: 4 MMOL/L (ref 3.5–5.1)
RBC # BLD AUTO: 5 M/MM3 (ref 4.2–5.4)
RBC DISTRIBUTION WIDTH CV: 12.6 % (ref 11.6–14.6)
RBC DISTRIBUTION WIDTH SD: 39.2 FL (ref 35.1–43.9)
WBC # BLD AUTO: 7.5 K/MM3 (ref 4.4–11)
WHITE BLOOD COUNT: 7.5 K/MM3 (ref 4.4–11)

## 2024-10-22 LAB — ESTROGEN SERPL-MCNC: 185 PG/ML (ref 40–244)

## 2024-10-24 ENCOUNTER — HOSPITAL ENCOUNTER (OUTPATIENT)
Dept: HOSPITAL 100 - SDC | Age: 54
Discharge: HOME | End: 2024-10-24
Payer: COMMERCIAL

## 2024-10-24 VITALS
SYSTOLIC BLOOD PRESSURE: 109 MMHG | OXYGEN SATURATION: 93 % | HEART RATE: 90 BPM | TEMPERATURE: 97.88 F | DIASTOLIC BLOOD PRESSURE: 57 MMHG | RESPIRATION RATE: 16 BRPM

## 2024-10-24 VITALS
SYSTOLIC BLOOD PRESSURE: 108 MMHG | DIASTOLIC BLOOD PRESSURE: 90 MMHG | HEART RATE: 75 BPM | RESPIRATION RATE: 16 BRPM | OXYGEN SATURATION: 95 %

## 2024-10-24 VITALS
RESPIRATION RATE: 18 BRPM | SYSTOLIC BLOOD PRESSURE: 114 MMHG | TEMPERATURE: 97.4 F | DIASTOLIC BLOOD PRESSURE: 97 MMHG | HEART RATE: 77 BPM | OXYGEN SATURATION: 94 %

## 2024-10-24 VITALS
SYSTOLIC BLOOD PRESSURE: 140 MMHG | HEART RATE: 63 BPM | OXYGEN SATURATION: 97 % | TEMPERATURE: 97.34 F | RESPIRATION RATE: 16 BRPM | DIASTOLIC BLOOD PRESSURE: 90 MMHG

## 2024-10-24 VITALS
OXYGEN SATURATION: 97 % | SYSTOLIC BLOOD PRESSURE: 140 MMHG | TEMPERATURE: 97.34 F | DIASTOLIC BLOOD PRESSURE: 90 MMHG | RESPIRATION RATE: 16 BRPM | HEART RATE: 63 BPM

## 2024-10-24 VITALS
DIASTOLIC BLOOD PRESSURE: 66 MMHG | SYSTOLIC BLOOD PRESSURE: 113 MMHG | HEART RATE: 75 BPM | OXYGEN SATURATION: 94 % | RESPIRATION RATE: 16 BRPM

## 2024-10-24 VITALS — BODY MASS INDEX: 41.5 KG/M2

## 2024-10-24 VITALS
OXYGEN SATURATION: 94 % | RESPIRATION RATE: 16 BRPM | DIASTOLIC BLOOD PRESSURE: 90 MMHG | SYSTOLIC BLOOD PRESSURE: 106 MMHG | HEART RATE: 74 BPM

## 2024-10-24 VITALS
DIASTOLIC BLOOD PRESSURE: 57 MMHG | HEART RATE: 72 BPM | SYSTOLIC BLOOD PRESSURE: 140 MMHG | OXYGEN SATURATION: 93 % | RESPIRATION RATE: 16 BRPM | TEMPERATURE: 97.88 F

## 2024-10-24 VITALS — SYSTOLIC BLOOD PRESSURE: 140 MMHG | DIASTOLIC BLOOD PRESSURE: 90 MMHG

## 2024-10-24 DIAGNOSIS — Z79.899: ICD-10-CM

## 2024-10-24 DIAGNOSIS — E78.00: ICD-10-CM

## 2024-10-24 DIAGNOSIS — F32.A: ICD-10-CM

## 2024-10-24 DIAGNOSIS — N95.0: Primary | ICD-10-CM

## 2024-10-24 LAB — INTERNAL QC VALIDATED?: (no result)

## 2024-10-24 PROCEDURE — 80053 COMPREHEN METABOLIC PANEL: CPT

## 2024-10-24 PROCEDURE — 81025 URINE PREGNANCY TEST: CPT

## 2024-10-24 PROCEDURE — 00952 ANES VAG PX HYSTSC&/HSG: CPT

## 2024-10-24 PROCEDURE — 0UB98ZZ EXCISION OF UTERUS, VIA NATURAL OR ARTIFICIAL OPENING ENDOSCOPIC: ICD-10-PCS | Performed by: OBSTETRICS & GYNECOLOGY

## 2024-10-24 PROCEDURE — 88305 TISSUE EXAM BY PATHOLOGIST: CPT

## 2024-10-24 PROCEDURE — 36415 COLL VENOUS BLD VENIPUNCTURE: CPT

## 2024-10-24 PROCEDURE — 82672 ASSAY OF ESTROGEN: CPT

## 2024-10-24 PROCEDURE — 85027 COMPLETE CBC AUTOMATED: CPT

## 2024-10-24 PROCEDURE — 58558 HYSTEROSCOPY BIOPSY: CPT

## 2024-10-24 PROCEDURE — 83001 ASSAY OF GONADOTROPIN (FSH): CPT

## 2024-10-24 PROCEDURE — A4216 STERILE WATER/SALINE, 10 ML: HCPCS

## 2024-10-24 RX ADMIN — LIDOCAINE HYDROCHLORIDE AND EPINEPHRINE 20 ML: 10; 10 INJECTION, SOLUTION INFILTRATION; PERINEURAL at 10:51

## 2024-10-24 RX ADMIN — KETOROLAC TROMETHAMINE 30 MG: 30 INJECTION, SOLUTION INTRAMUSCULAR; INTRAVENOUS at 09:49

## 2024-10-24 NOTE — PCM.POSTANE2
Anesthesia Postop Eval I Sum
Postop Eval Completion status
Anesthesia document: Postop Eval 1 completed: Yes
Anesthesia Postop Eval I Summary
Anesthesia Postop Eval I Summary: 
Anesthesia Postop Eval I:  Assessment Summary

Airway patent                  Yes                10/24/24 11:07 [email or web address]
Spontaneous unlabored          Yes                10/24/24 11:07 [email or web address]
respirations                     
Mental status                  Awake,Calm         10/24/24 11:07 [email or web address]
nausea                         No                 10/24/24 11:07 [email or web address]
Vomiting                       No                 10/24/24 11:07 CRNAEDNA


Anesthesia Postop Eval I: Fluid Summary

Crystalloid volume administer  10                 10/24/24 11:07 [email or web address]
(ml)                             
Colloids volume administered (   
ml)                              
Blood Product volume             
administered (ml)                
Total IV fluid infused         10                 10/24/24 11:07 [email or web address]


Anesthesia Postop Eval I: Summary Notes

Anesthesia Complication        No                 10/24/24 11:07 [email or web address]
Anesthesia Complication          
Comment:                         
Post-operative progress note     




Anesthesia: Postop Eval II
Evaluation
Mental status: Awake
Pain Level: 0
nausea: No
Vomiting: No

## 2024-10-24 NOTE — PCM.POST.ANE
Anesthesia: Postop Eval I
Current Vital Signs
Temperature: 97.8 F
Pulse Rate: 90
Blood Pressure: 109/57
Respiratory Rate: 16
Pulse Ox: 93
Oxygen Delivery Method: Room Air
Assessment
Airway patent: Yes
Spontaneous unlabored respirations: Yes
Mental status: Awake and Calm
nausea: No
Vomiting: No
Anesthesia Complication: No
Fluid Hydration
Crystalloid volume administer (ml): 10
Total IV fluid infused: 10
Progress Note
Anesthesia document: Postop Eval 1 completed: Yes

## 2024-10-24 NOTE — PCM.OPRPT
Problems
Associated Problem List Diagnoses
(1) Endometrial thickening on ultrasound: 
(2) PMB (postmenopausal bleeding):

Operative Report (Standard)
Operative Information
Surgery/Procedure Performed: Hysteroscopy D&C
Surgeon: Radha Acosta
Date of Procedure: 10/24/24
Procedure Start Time: 10:48
Procedure Stop Time: 10:57
Pre-Operative Diagnosis: Abnormal uterine bleeding, thickened endometrium on the pelvic
Post-Operative Diagnosis: Same
Select all DRAINS/GRAFTS/IMPLANTS that apply: None
Type of Anesthesia: MAC/Supplemental/Local
Special Medications: None
Estimated Blood Loss: 10
Fluids Replaced: 0
Specimen collected: Yes Description of specimen(s) removed: Endometrial curettings
Description of surgery: 
The patient was taken to the OR where she was prepped and draped in dorsal lithotomy position.  The weighted speculum was placed in the vagina and the anterior lip of the cervix was grasped with a single-tooth tenaculum.  A paracervical block was 
administered with 1% lidocaine with 1-100,000 epinephrine solution.  The cervix was dilated serially with Hegar dilators.  The 5mm hysteroscope was placed into the uterine cavity and the above findings were noted. Bilateral tubal ostia were 
identified.

The hysteroscope was removed.  A gentle sharp curettage was done of the uterine cavity.  The instruments were removed from the vagina.  The specimen was handed off and sent to pathology.  All sponge and needle counts were correct.  Vaginal sweep was 
performed by me.  The patient was awakened and taken to the recovery room in stable condition.

Hysteroscopic fluid deficit 50 cc of normal saline

Findings:
Endometrial cavity: Normal, no fibroids or polyps noted
Cervix: Normal
Vagina: Normal
Surgical Findings: 
Normal endometrium, no focal abnormalities.  Normal tubal ostia bilaterally.  Normal cervix and vagina.
First Assist
FIRST Assistant: Yes First Assistant: Ricky Patel MS3  
Tasks completed by first assist: Retracting Additional assistant?: No
Complications
Complications: No
Admit VTE Documentation
VTE Present on Admission: No
VTE Mechan Device Prophylaxis: SCD's
VTE Pharm Prophylaxis ordered?: No

## 2024-10-24 NOTE — PCM.PRE.AN2
ASA Classification*
ASA Classification
ASA Classification: 3

Assessment & Plan Anesthesia*
Anesthesia Assessment
Anesthesia Assessment: 

Discussed sedation and/or anesthesia options, risks, benefits, and alternatives with patient/parents/legal guardian/POA. Questions invited. 

The patient/parents/legal guardian/POA seems to understand and agrees to proceed with anesthesia plan.

Reviewed the physical assessment, medical history, allergy history and patient home medications list prior to surgery/procedure/anesthetic and documented any changes.

Performed airway and anesthesia risk assessments.

Anesthesia Type
Anesthesia Type: MAC (see written pre anesthesia record for full assessment)

Anesthesia Focused Assessment*
Temperature: 97.3 F
Pulse Rate: 63
Blood Pressure: 140/90
Respiratory Rate: 16
Pulse Ox: 97
Airway Assessment
Mouth opens: >3 cm
Mallampati Score: II

Focused Labs
Anesthesia Preop lab: 
       *** CBC ***  
      WBC 7.5 K/mm3 (4.4-11.0)  10/18/24 16:08 
      RBC 5.00 M/mm3 (4.2-5.4)  10/18/24 16:08 
      Hgb 13.6 g/dL (12.0-15.0)  10/18/24 16:08 
      Hct 42.6 % (37-47)  10/18/24 16:08 
      Plt Count 317 K/mm3 (150-450)  10/18/24 16:08 
       *** CHEMISTRY ***  
      Potassium 4.0 mmol/L (3.5-5.1)  10/18/24 16:08 
      Sodium 138 mmol/L (136-145)  10/18/24 16:08 
      BUN 13 mg/dL (7-18)  10/18/24 16:08 
      Creatinine 0.75 mg/dL (0.55-1.02)  10/18/24 16:08 
      Glucose 82 mg/dL ()  10/18/24 16:08 
       *** COAG ***  
       *** PREGNANCY ***  
      Urine Pregnancy Test Negative Negative 10/24/24 09:20 


Pre-Assessment
Diagnosis/Proposed Procedure
Planned Operative Procedure(s): (N/A) Hysteroscopy,D&C, possible polyp resection, Symphion 

Anesthesia History
Anesthesia History - RN Documentation: 
Anesthesia History - RN Documentation

Hx Hospitalization             No                           10/17/24 13:28
Any Problems With Anesthesia   No                           10/17/24 13:28
Cholinesterase deficiency      No                           10/17/24 13:28
You/Your Family Experience     No                           10/17/24 13:28
fever (hyperthermia) with       
Relationship                    
Recent Exposure to Contagious  No                           10/24/24 09:31
Disease                         
Does patient have nerve        No                           10/17/24 13:28
stimulator                      
Patient instructed to have      
device shut off                 
--Does patient have Pacemaker  No                           10/24/24 09:31
or ICD?                         
When Was Last Pacemaker Check   


*** QUESTION #4 FULL TEXT: You/Your Family Experience fever (hyperthermia) with Anesthesia

Last Oral Intake
Last Oral intake: 
Last Oral Intake

NPO since                      22:00                        10/24/24 09:31
Meds taken in AM with sips of  No                           10/24/24 09:31
water?                          
Meds patient instructed to      
take am of surgery              



PONV
PONV - RN Documentation: 
PONV - RN Documentation

Female                         Yes                          10/17/24 13:28
HX of Motion Sickness          Yes                          10/17/24 13:28
HX of N/V After Surgery        No                           10/17/24 13:28
Non-Smoker                     Yes                          10/17/24 13:28
Duration of Surgery greater    No                           10/17/24 13:28
than 60 minutes                 
Number of Risk Factors         3                            10/17/24 13:28
PONV Score                     Moderate Risk                10/17/24 13:28



Height & Weight
Height & Weight: 
Anesthesia: Height & Weight

Height                         5 ft 3 in                    10/24/24 09:31
Weight:                        106.5 kg                     10/24/24 09:31
Body Mass Index (BMI)          41.5                         10/24/24 09:31



Respiratory Assessment
Respiratory Assessment - RN Documentation: 
Respiratory Tract Infection Hx - RN Documentation

Hx Respiratory Tract Infection No                           10/17/24 13:28



STOP Sleep Apnea
STOP Sleep Apnea - RN Documentation: 
STOP Sleep Apnea - RN Documentation

Hx Hypertension                No                           10/17/24 13:28
Hx Sleep Apnea                 No                           10/17/24 13:28
CPAP                            
BIPAP                           
Do you snore loudly (louder    No                           10/17/24 13:28
than talking or can be heard    
Do you often feel tired/       No                           10/17/24 13:28
fatigued/ sleepy during         
daytime?                        
Has anyone observed you stop   No                           10/17/24 13:28
breathing during sleep?         
STOP Results                   Negative                     10/17/24 13:28



*** QUESTION #5 FULL TEXT : Do you snore loudly (louder than talking or can be heard through closed doors)? 

Tobacco Use History
Tobacco Use History - RN Documentation: 
Tobacco Use History - RN Documentation

Tobacco Use                     
Smoking Status                 Never smoker                 10/17/24 13:28
Hx Tobacco Use                 No                           10/17/24 13:28
Years Smoking                   
Packs Smoked per Day            
Smoking Cessation Date was      
within the last 15 years        
Hx Smoking Cessation Date       
Hx Smoking Cessation            
Counseling                      



Hematologic Medial History
Hematologic Hx - RN Documentation: 
Hematologic Medical Hx - RN documentation

Hx of Blood Transfusion        No                           10/17/24 13:28
Hx of Transfusion in last 3    No                           10/17/24 13:28
Months                          
Date of Last Transfusion (if    
within last 3 months)           
Ever experience any problems   No                           10/17/24 13:28
with transfusion(s)?            
Specify any problems            
Hx of Preganancy in last 3     N/A                          10/17/24 13:28
Months                          
Nurse Filling Out Transfusion  NBUCHER                      10/17/24 13:28
& Pregnancy Questions:          
Date:                          10/17/24                     10/17/24 13:28
Time:                          13:29                        10/17/24 13:28
Patient unable to answer at     
this time (ie. confused,        
unrespo                         



Pregnancy/Reproduction History
Pregnancy/Reproductive History - RN Documentation: 
Pregnancy/Reproductive Hx- RN Documentation

Hx Pregnant Now                No                           10/17/24 13:28
Gestational Age (in weeks):     
EDC:                            
Hx                       
Hx Para                         
Hx  Section             
SAB                             
Breastfeeding                  No                           10/17/24 13:28



Active Medications
Active Medications: 
Current Medications

Generic Name Dose Route Start Last Admin
  Trade Name Freq  PRN Reason Stop Dose Admin
Acetaminophen  1,000 mg  10/24/24 10:35  10/24/24 09:49
  Acetaminophen 500 Mg Tablet  PO  10/24/24 10:36  1,000 mg
  PREOP ONE   Administration
Ketorolac Tromethamine  30 mg  10/24/24 10:35  10/24/24 09:49
  Ketorolac 30 Mg/Ml Syringe  IV  10/24/24 10:36  30 mg
  PREOP ONE   Administration



PFSH
Medical History (Reviewed 10/24/24 @ 10:29 by Dr. Sohail Freeman MD)

Wears glasses
Depression
High cholesterol
Migraine headache
Non-smoker
History of kidney stones


Home Medications

?Medication ?Instructions ?Recorded ?Last Taken ?Type
aripiprazole 2 mg tablet 2 mg PO DAILY 10/17/24 Unknown History
atorvastatin 20 mg tablet 20 mg PO DAILY 10/17/24 Unknown History
sertraline 100 mg tablet 100 mg PO BID 10/17/24 Unknown History




Allergy/AdvReac Type Severity Reaction Status Date / Time
tree nut Allergy Severe Anaphylaxis Verified 10/17/24 13:27


Surgical History (Reviewed 10/24/24 @ 10:29 by Dr. Sohail Freeman MD)

History of appendectomy


Social History (Reviewed 10/24/24 @ 10:29 by Dr. Sohail Freeman MD)
Smoking Status:  Never smoker 



Review of Systems (Anesthesia)
ROS Narrative
System reviewed and no additional complaints, except as documented.

## 2024-10-24 NOTE — PCM.DC
Discharge Instructions
Diet
Discharge Diet: No restrictions
Activity
Discharge Activity: May Drive (10/25/24) and May Take a Tub Bath (in 1 week)
Return to work on:: 10/25/24
May shower in (days): 1
May resume sexual activity in: 1-2 weeks and 2 weeks
Lifting Restrictions: none
Dressing / Incision
Call your doctor if your incision/area has: Sudden Increased Bleeding and Foul Smelling Discharge
Call your doctor if you observe: Fever of 101 or Higher and Using more than 1 pad per hour (for 2 hrs in a row)
Follow Up Care
Please Follow Up With: Radha Acosta MD
When: You do not need a postop appointment.  We will contact you next week with your pathology results.  Send a [employer] message or call [phone] to make an appointment or with any concerns. 
Test Results: 
Test results from this visit will be discussed in further detail at your follow-up appointment, if applicable.


Discharge Plan
Admission
Primary Reason for Your Visit: Hysteroscopy D&C

Attending Provider: Radha Acosta

Primary Care Provider: Janie Parish

Instructions
Print Language: English

Discharge Orders/Prescriptions
Prescriptions:
Continued
  atorvastatin 20 mg tablet 
   20 mg PO DAILY 
  sertraline 100 mg tablet 
   100 mg PO BID 
  aripiprazole 2 mg tablet 
   2 mg PO DAILY 

Disposition
Disposition (needs filled in before D/C Order can be placed): Home, Self Care

## 2024-10-24 NOTE — DCINST_ITS
Discharge Instructions    
Diet    
Discharge Diet: No restrictions    
Activity    
Discharge Activity: May Drive (10/25/24) and May Take a Tub Bath (in 1 week)    
Return to work on:: 10/25/24    
May shower in (days): 1    
May resume sexual activity in: 1-2 weeks and 2 weeks    
Lifting Restrictions: none    
Dressing / Incision    
Call your doctor if your incision/area has: Sudden Increased Bleeding and Foul   
Smelling Discharge    
Call your doctor if you observe: Fever of 101 or Higher and Using more than 1   
pad per hour (for 2 hrs in a row)    
Follow Up Care    
Please Follow Up With: Radha Acosta MD    
When: You do not need a postop appointment.  We will contact you next week with   
your pathology results.  Send a [employer] message or call [phone] to make an   
appointment or with any concerns.     
Test Results:     
Test results from this visit will be discussed in further detail at your follow-  
up appointment, if applicable.    
    
    
Discharge Plan    
Admission    
Primary Reason for Your Visit: Hysteroscopy D&C    
    
Attending Provider: Radha Acosta    
    
Primary Care Provider: Janie Parish    
    
Instructions    
Print Language: English    
    
Discharge Orders/Prescriptions    
Prescriptions:    
Continued    
  atorvastatin 20 mg tablet     
   20 mg PO DAILY     
  sertraline 100 mg tablet     
   100 mg PO BID     
  aripiprazole 2 mg tablet     
   2 mg PO DAILY     
    
Disposition    
Disposition (needs filled in before D/C Order can be placed): Home, Self Care

## 2024-10-24 NOTE — EMB_PTH
PATHOLOGY RESULTS

PATIENT: CASSI DE LA TORRE      ACCT #:[id number]  [place] [facility] [id number]
                                       AGE/SX: 54/F         ROOM:           REG: 10/24/2024
REG DR: Dr. Radha Acosta MD        : 1970      BED:            DIS: 10/24/2024

--------------------------------------------------------------------------------------------

SPEC #: [id number]        RECD: 10/24/24 13:47    STATUS:  CORINNE ROSENTHAL #: [id number]
                        ENRRIQUE: 10/24/24 10:35    SUBM DR: Radha Acosta

DEPT: SURGICAL PATHOLOGY                        RECD BY: Albert Diaz

ENTERED: 10/24/24 14:39 SP TYPE: ENDOM BX/C CHIN DR: Dr. Janie Parish MD

Tissues:    Endometrium, NOS
Procedures: Surgery Specimen Level IV

--------------------------------------------------------------------------------------------

HEADER

OPERATION: Hysteroscopy, D&C  
PRE-OP DIAGNOSIS: Post menopausal bleeding, endometrial thickening on ultrasound  
TISSUE SUBMITTED: Endometrial curettings  

--------------------------------------------------------------------------------------------

MICROSCOPIC DIAGNOSIS

Endometrium, curettings:  
    Polypoid fragments of simple cystic change.  
    Perimenstral endometrium with glandular and stromal breakdown.  
    Rare fragments of benign squamous and endocervical mucosa.  
    See comment.  
  
 10/25/2024 

--------------------------------------------------------------------------------------------

COMMENT

Benign endometrial polyps are likely. Clinical correlation is suggested.

--------------------------------------------------------------------------------------------

MICROSCOPIC DESCRIPTION

Slides are reviewed.  

--------------------------------------------------------------------------------------------

GROSS DESCRIPTION

Received in fixative is one container labeled with the patient's name and designated 
 Endometrial curettings.   The specimen consists of multiple irregular fragments of pink-tan 
hemorrhagic soft tissue that in aggregate measure 4.0 x 3.0 x 0.2 cm.  The specimen is 
totally submitted in two cassettes.   10/24/2024 TC:5  
CPT:53384 numerical 0-10

## 2024-11-02 ENCOUNTER — OFFICE VISIT (OUTPATIENT)
Dept: URGENT CARE | Facility: CLINIC | Age: 54
End: 2024-11-02
Payer: COMMERCIAL

## 2024-11-02 VITALS
HEART RATE: 75 BPM | SYSTOLIC BLOOD PRESSURE: 131 MMHG | DIASTOLIC BLOOD PRESSURE: 82 MMHG | WEIGHT: 237 LBS | HEIGHT: 63 IN | BODY MASS INDEX: 41.99 KG/M2 | OXYGEN SATURATION: 95 % | TEMPERATURE: 97.5 F

## 2024-11-02 DIAGNOSIS — J20.9 ACUTE BRONCHITIS, UNSPECIFIED ORGANISM: Primary | ICD-10-CM

## 2024-11-02 PROCEDURE — 99213 OFFICE O/P EST LOW 20 MIN: CPT | Performed by: NURSE PRACTITIONER

## 2024-11-02 RX ORDER — INHALER, ASSIST DEVICES
SPACER (EA) MISCELLANEOUS
Qty: 1 EACH | Refills: 0 | Status: SHIPPED | OUTPATIENT
Start: 2024-11-02 | End: 2025-11-02

## 2024-11-02 RX ORDER — AZITHROMYCIN 250 MG/1
TABLET, FILM COATED ORAL
Qty: 6 TABLET | Refills: 0 | Status: SHIPPED | OUTPATIENT
Start: 2024-11-02 | End: 2024-11-07

## 2024-11-02 RX ORDER — ALBUTEROL SULFATE 90 UG/1
2 INHALANT RESPIRATORY (INHALATION) EVERY 6 HOURS PRN
Qty: 18 G | Refills: 0 | Status: SHIPPED | OUTPATIENT
Start: 2024-11-02 | End: 2025-11-02

## 2024-11-02 RX ORDER — PREDNISONE 10 MG/1
30 TABLET ORAL DAILY
Qty: 15 TABLET | Refills: 0 | Status: SHIPPED | OUTPATIENT
Start: 2024-11-02 | End: 2024-11-07

## 2025-01-08 NOTE — PCM.HP.BLA
History and Physical
Date of Admission: 01/30/25
HPI: The patient is a 54 year old female presenting for pre-operative visit.
She is scheduled for LAVH and BSO, possible cystoscopy, for AUB, uterine fibroids
on 1/30/25.   Procedure discussed along with risks, benefits and complications.  Other
alternatives discussed for management. Consent form signed? Yes.  
 
 
PAST MEDICAL HISTORY
PAST MEDICAL HISTORY
Diagnosis Date
? Depression  
? High cholesterol  

  
 
 
PAST SURGICAL HISTORY
PAST SURGICAL HISTORY
Procedure Laterality Date
? APPENDECTOMY   1995
? COLONOSCOPY SCREENING   2019
? D&C, DIAG AND/OR THERAPEUTIC   10/24/2024
  hysteroscopy
? EXTRACTION ERUPTED TOOTH/EXR    
  unknown date
? LITHOTRIPSY XTRCORP SHOCK WAVE   2009
  Renal Lithotripsy

 
 
 
CURRENT MEDICATIONS
Current Outpatient Medications
Medication Sig Dispense Refill
? SUMAtriptan (IMITREX) 50 mg tablet      
? MAGNESIUM ORAL Take by mouth.    
? norethindrone (AYGESTIN) 5 mg tablet 1 tab 3x/day until bleeding stops. 1 tab 2x/day x 2 days. 1 tab daily x 5 days 35 tablet 0
? atorvastatin (LIPITOR) 20 mg tablet Take 20 mg by mouth.    
? ARIPiprazole (ABILIFY) 2 mg tablet Take 2 mg by mouth.    
? albuterol HFA (PROVENTIL HFA, VENTOLIN HFA) 90 mcg/actuation inhaler Inhale 2 puffs every 4 hours by inhalation route as needed.    
? sertraline (ZOLOFT) 100 mg tablet Take 200 mg by mouth.    
 

No current facility-administered medications for this visit.

 
 
ALLERGIES: Nut - Unspecified
 
PERSONAL HISTORY: 
SOCIAL HISTORY
Social History
 

Tobacco Use
? Smoking status: Never
? Smokeless tobacco: Never
Vaping Use
? Vaping status: Never Used
Substance Use Topics
? Alcohol use: Yes
    Comment: rare
? Drug use: Not Currently

  
 
FAMILY HISTORY: 
FAMILY HISTORY
FAMILY HISTORY 
Problem Relation Age of Onset
? Depression Mother  
? Diabetes Mother  
? other (hypercholesterolemia [other]) Mother  
? other (htn) Mother  
? Thyroid Mother  
      Thyroid disease
? Alcohol abuse Father  
? other (cva) Father  
? Ischemic Heart Disease Father  
? Diabetes Father  
? other (hypercholesterolemia [other]) Father  
? other (TIA) Father  
? Parkinson's Father  
? other (hypercholesterolemia [other]) Brother  
? Brain Cancer Maternal Grandmother  
? other (cardiac disorder) Maternal Grandfather  
? other (htn) Maternal Grandfather  
? other (CVA) Maternal Grandfather  
? other (Cardiac disorder) Paternal Grandfather  
? other (htn) Paternal Grandfather  
? Diabetes Paternal Grandfather  

 
 
REVIEW OF SYMPTOMS:
GENERAL: denies fevers or chills
ENDOCRINOLOGY: has not been on steroids
Cardiology : denies palpitations or chest pain
Respiratory: denies SOB or cough
Hematology: denies history of prolonged bleeding or easy bruising or VTE
Allergy: Denies history of personal or family history of allergy to anesthesia
 
 
PHYSICAL EXAMINATION:
 
VITALS: Blood pressure 142/86, weight 111.1 kg (245 lb), last menstrual period 01/01/2025.
 
GENERAL:  The patient is well nourished, well hydrated in no acute distress.  , The patient is oriented to time, place, and person.
NECK: Supple. No lynphadenopathy, normal thyroid, no thyromegaly.
LUNGS: Clear to auscultation bilaterally. no wheezes, rhonchi or rales
HEART: Regular rate and rhythm, Normal heart sounds, and No murmurs or gallops
GENITALIA: Normal external genitalia, Urethral meatus normal, Bladder nontender, normal vagina and normal vaginal tone, normal cervix, normal uterus, size and consistency, normal adnexa without masses or tenderness, and perineum WNL

PELVIC US 10/3/24:

Indication
Postmenopausal bleeding, fibroid

Impression
Anteverted fibroid uterus that measures 98 mm x 47 mm x 59 mm. The largest
fibroids are described below.
Endometrium measures 7.8 mm which is abnormally thickened in menopause.
Normal appearing left ovary.
Right ovary is not visualized.
No adnexal masses were observed.
There is no free fluid visualized in the peritoneal cavity.

Recommendations
Recommend endometrial evaluation as clinically indicated.

Menstrual History
LMP on 9/20/2024. Cycle: irregular cycle

Method
Transabdominal, transvaginal, 3D ultrasound examination, Color Doppler examination

Uterus
Uterus: Visualized
Uterus position: anteverted
Description of uterine malformations: none
Myometrium: heterogeneous
Endometrium: homogenous
Cervix details: cystic lesions identified suggesting superficial Nabothian cysts
Uterus length 98 mm
Uterus width 59 mm
Uterus height 47 mm
Uterus Vol 139.8 cm?
Endometrial thickness, total 7.8 mm
Uterine fibroid D1 8 mm
Uterine fibroid D2 8 mm
Uterine fibroid D3 10 mm
Uterine fibroid mean 8.7 mm
Uterine fibroid vol 0.335 cm?
Uterine fibroids findings: Left lateral anterior wall. intramural

Right Ovary
Rt ovary: Not visualized

Left Ovary
Lt ovary: Visualized
Lt ovary morphology: premenopausal normal follicular
Lt ovary D1 34 mm
Lt ovary D2 24 mm
Lt ovary D3 22 mm
Lt ovary Vol 9.4 cm?

Cul de Sac
Visualized. no free fluid visualized




IMPRESSION: uterine fibroids, AUB
 
PLAN:   The risks/benefits/alternatives and personal involved for the planned LAVH, BSO, possible cystoscopy were reviewed with the patient. Her questions were answered to her satisfaction and she desires to proceed.  Consent was signed.  I reviewed 
with her postop instructions and expectations.
 
 
I have reviewed and updated past medical and surgical history, medications and allergies 


Assessment & Plan
Assessment/Plan
(1) Abnormal uterine bleeding: 
(2) Uterine fibroid:

## 2025-01-08 NOTE — HP.PCM_ITS
History and Physical    
Date of Admission: 01/30/25    
HPI: The patient is a 54 year old female presenting for pre-operative visit.    
She is scheduled for LAVH and BSO, possible cystoscopy, for AUB, uterine   
fibroids    
on 1/30/25.   Procedure discussed along with risks, benefits and complications.   
Other    
alternatives discussed for management. Consent form signed? Yes.      
     
     
PAST MEDICAL HISTORY    
PAST MEDICAL HISTORY    
Diagnosis   Date    
?   Depression        
?   High cholesterol        
    
      
     
     
PAST SURGICAL HISTORY    
PAST SURGICAL HISTORY    
Procedure   Laterality   Date    
?   APPENDECTOMY       1995    
?   COLONOSCOPY SCREENING       2019    
?   D&C, DIAG AND/OR THERAPEUTIC       10/24/2024    
    hysteroscopy    
?   EXTRACTION ERUPTED TOOTH/EXR            
    unknown date    
?   LITHOTRIPSY XTRCORP SHOCK WAVE       2009    
    Renal Lithotripsy    
    
     
     
     
CURRENT MEDICATIONS    
Current Outpatient Medications    
Medication   Sig   Dispense   Refill    
?   SUMAtriptan (IMITREX) 50 mg tablet                
?   MAGNESIUM ORAL   Take by mouth.            
?   norethindrone (AYGESTIN) 5 mg tablet   1 tab 3x/day until bleeding stops. 1   
tab 2x/day x 2 days. 1 tab daily x 5 days   35 tablet   0    
?   atorvastatin (LIPITOR) 20 mg tablet   Take 20 mg by mouth.            
?   ARIPiprazole (ABILIFY) 2 mg tablet   Take 2 mg by mouth.            
?   albuterol HFA (PROVENTIL HFA, VENTOLIN HFA) 90 mcg/actuation inhaler     
Inhale 2 puffs every 4 hours by inhalation route as needed.            
?   sertraline (ZOLOFT) 100 mg tablet   Take 200 mg by mouth.            
     
    
No current facility-administered medications for this visit.    
    
     
     
ALLERGIES: Nut - Unspecified    
     
PERSONAL HISTORY:     
SOCIAL HISTORY    
Social History    
     
    
Tobacco Use    
?   Smoking status:   Never    
?   Smokeless tobacco:   Never    
Vaping Use    
?   Vaping status:   Never Used    
Substance Use Topics    
?   Alcohol use:   Yes    
        Comment: rare    
?   Drug use:   Not Currently    
    
      
     
FAMILY HISTORY:     
FAMILY HISTORY    
FAMILY HISTORY     
Problem   Relation   Age of Onset    
?   Depression   Mother        
?   Diabetes   Mother        
?   other (hypercholesterolemia [other])   Mother        
?   other (htn)   Mother        
?   Thyroid   Mother        
        Thyroid disease    
?   Alcohol abuse   Father        
?   other (cva)   Father        
?   Ischemic Heart Disease   Father        
?   Diabetes   Father        
?   other (hypercholesterolemia [other])   Father        
?   other (TIA)   Father        
?   Parkinson's   Father        
?   other (hypercholesterolemia [other])   Brother        
?   Brain Cancer   Maternal Grandmother        
?   other (cardiac disorder)   Maternal Grandfather        
?   other (htn)   Maternal Grandfather        
?   other (CVA)   Maternal Grandfather        
?   other (Cardiac disorder)   Paternal Grandfather        
?   other (htn)   Paternal Grandfather        
?   Diabetes   Paternal Grandfather        
    
     
     
REVIEW OF SYMPTOMS:    
GENERAL: denies fevers or chills    
ENDOCRINOLOGY: has not been on steroids    
Cardiology : denies palpitations or chest pain    
Respiratory: denies SOB or cough    
Hematology: denies history of prolonged bleeding or easy bruising or VTE    
Allergy: Denies history of personal or family history of allergy to anesthesia    
     
     
PHYSICAL EXAMINATION:    
     
VITALS: Blood pressure 142/86, weight 111.1 kg (245 lb), last menstrual period   
01/01/2025.    
     
GENERAL:  The patient is well nourished, well hydrated in no acute distress.  ,   
The patient is oriented to time, place, and person.    
NECK: Supple. No lynphadenopathy, normal thyroid, no thyromegaly.    
LUNGS: Clear to auscultation bilaterally. no wheezes, rhonchi or rales    
HEART: Regular rate and rhythm, Normal heart sounds, and No murmurs or gallops    
GENITALIA: Normal external genitalia, Urethral meatus normal, Bladder nontender,  
 normal vagina and normal vaginal tone, normal cervix, normal uterus, size and   
consistency, normal adnexa without masses or tenderness, and perineum WNL    
    
PELVIC US 10/3/24:    
    
Indication    
Postmenopausal bleeding, fibroid    
    
Impression    
Anteverted fibroid uterus that measures 98 mm x 47 mm x 59 mm. The largest    
fibroids are described below.    
Endometrium measures 7.8 mm which is abnormally thickened in menopause.    
Normal appearing left ovary.    
Right ovary is not visualized.    
No adnexal masses were observed.    
There is no free fluid visualized in the peritoneal cavity.    
    
Recommendations    
Recommend endometrial evaluation as clinically indicated.    
    
Menstrual History    
LMP on 9/20/2024. Cycle: irregular cycle    
    
Method    
Transabdominal, transvaginal, 3D ultrasound examination, Color Doppler exam  
ination    
    
Uterus    
Uterus: Visualized    
Uterus position: anteverted    
Description of uterine malformations: none    
Myometrium: heterogeneous    
Endometrium: homogenous    
Cervix details: cystic lesions identified suggesting superficial Nabothian cysts    
Uterus length 98 mm    
Uterus width 59 mm    
Uterus height 47 mm    
Uterus Vol 139.8 cm?    
Endometrial thickness, total 7.8 mm    
Uterine fibroid D1 8 mm    
Uterine fibroid D2 8 mm    
Uterine fibroid D3 10 mm    
Uterine fibroid mean 8.7 mm    
Uterine fibroid vol 0.335 cm?    
Uterine fibroids findings: Left lateral anterior wall. intramural    
    
Right Ovary    
Rt ovary: Not visualized    
    
Left Ovary    
Lt ovary: Visualized    
Lt ovary morphology: premenopausal normal follicular    
Lt ovary D1 34 mm    
Lt ovary D2 24 mm    
Lt ovary D3 22 mm    
Lt ovary Vol 9.4 cm?    
    
Cul de Sac    
Visualized. no free fluid visualized    
    
    
    
    
IMPRESSION: uterine fibroids, AUB    
     
PLAN:   The risks/benefits/alternatives and personal involved for the planned   
LAVH, BSO, possible cystoscopy were reviewed with the patient. Her questions   
were answered to her satisfaction and she desires to proceed.  Consent was   
signed.  I reviewed with her postop instructions and expectations.    
     
     
I have reviewed and updated past medical and surgical history, medications and   
allergies     
    
    
Assessment & Plan    
Assessment/Plan    
(1) Abnormal uterine bleeding:     
(2) Uterine fibroid:

## 2025-01-20 LAB — MAGNESIUM: 2.5 MG/DL (ref 1.6–2.6)

## 2025-01-20 NOTE — EKG12_ITS
Test Reason : PREOP 
Blood Pressure :   */*   mmHG 
Vent. Rate :  69 BPM     Atrial Rate :  69 BPM 
   P-R Int : 150 ms          QRS Dur :  78 ms 
    QT Int : 390 ms       P-R-T Axes :  51   3  31 degrees 
  QTcB Int : 417 ms 
  
Normal sinus rhythm with sinus arrhythmia 
Normal ECG 
  
Confirmed by TIMI VILLALOBOS, JOLANTA ([id number]),  SUSANNE DAVIS ([id number]) on 1/20/2025 11:02:53 AM
 
  
Referred By: Radha Acosta           Confirmed By: JOLANTA CAPELLAN MD

## 2025-01-30 ENCOUNTER — HOSPITAL ENCOUNTER (OUTPATIENT)
Dept: HOSPITAL 100 - SDC | Age: 55
Discharge: HOME | End: 2025-01-30
Payer: COMMERCIAL

## 2025-01-30 VITALS
RESPIRATION RATE: 18 BRPM | TEMPERATURE: 97.88 F | DIASTOLIC BLOOD PRESSURE: 68 MMHG | SYSTOLIC BLOOD PRESSURE: 108 MMHG | HEART RATE: 77 BPM | OXYGEN SATURATION: 98 %

## 2025-01-30 VITALS
TEMPERATURE: 97.88 F | HEART RATE: 76 BPM | DIASTOLIC BLOOD PRESSURE: 78 MMHG | OXYGEN SATURATION: 90 % | RESPIRATION RATE: 16 BRPM | SYSTOLIC BLOOD PRESSURE: 134 MMHG

## 2025-01-30 VITALS
DIASTOLIC BLOOD PRESSURE: 68 MMHG | SYSTOLIC BLOOD PRESSURE: 118 MMHG | HEART RATE: 74 BPM | RESPIRATION RATE: 16 BRPM | OXYGEN SATURATION: 98 %

## 2025-01-30 VITALS
OXYGEN SATURATION: 93 % | HEART RATE: 74 BPM | OXYGEN SATURATION: 94 % | SYSTOLIC BLOOD PRESSURE: 134 MMHG | DIASTOLIC BLOOD PRESSURE: 68 MMHG | TEMPERATURE: 97.88 F | HEART RATE: 80 BPM | DIASTOLIC BLOOD PRESSURE: 78 MMHG | SYSTOLIC BLOOD PRESSURE: 137 MMHG | RESPIRATION RATE: 18 BRPM | RESPIRATION RATE: 16 BRPM

## 2025-01-30 VITALS
TEMPERATURE: 98 F | RESPIRATION RATE: 16 BRPM | DIASTOLIC BLOOD PRESSURE: 68 MMHG | HEART RATE: 74 BPM | OXYGEN SATURATION: 95 % | SYSTOLIC BLOOD PRESSURE: 137 MMHG

## 2025-01-30 VITALS
DIASTOLIC BLOOD PRESSURE: 68 MMHG | SYSTOLIC BLOOD PRESSURE: 137 MMHG | RESPIRATION RATE: 16 BRPM | OXYGEN SATURATION: 100 % | HEART RATE: 71 BPM

## 2025-01-30 VITALS
SYSTOLIC BLOOD PRESSURE: 120 MMHG | RESPIRATION RATE: 16 BRPM | DIASTOLIC BLOOD PRESSURE: 66 MMHG | OXYGEN SATURATION: 98 % | HEART RATE: 74 BPM

## 2025-01-30 VITALS
DIASTOLIC BLOOD PRESSURE: 68 MMHG | HEART RATE: 67 BPM | RESPIRATION RATE: 16 BRPM | OXYGEN SATURATION: 100 % | SYSTOLIC BLOOD PRESSURE: 137 MMHG | TEMPERATURE: 98.4 F

## 2025-01-30 VITALS
HEART RATE: 72 BPM | DIASTOLIC BLOOD PRESSURE: 68 MMHG | OXYGEN SATURATION: 98 % | SYSTOLIC BLOOD PRESSURE: 118 MMHG | RESPIRATION RATE: 16 BRPM

## 2025-01-30 VITALS
OXYGEN SATURATION: 100 % | TEMPERATURE: 98.4 F | DIASTOLIC BLOOD PRESSURE: 68 MMHG | HEART RATE: 67 BPM | SYSTOLIC BLOOD PRESSURE: 137 MMHG | RESPIRATION RATE: 16 BRPM

## 2025-01-30 VITALS
OXYGEN SATURATION: 96 % | SYSTOLIC BLOOD PRESSURE: 124 MMHG | RESPIRATION RATE: 16 BRPM | DIASTOLIC BLOOD PRESSURE: 68 MMHG | HEART RATE: 72 BPM

## 2025-01-30 VITALS — BODY MASS INDEX: 43.5 KG/M2

## 2025-01-30 DIAGNOSIS — F32.A: ICD-10-CM

## 2025-01-30 DIAGNOSIS — N80.03: ICD-10-CM

## 2025-01-30 DIAGNOSIS — Z79.899: ICD-10-CM

## 2025-01-30 DIAGNOSIS — E78.00: ICD-10-CM

## 2025-01-30 DIAGNOSIS — N83.8: Primary | ICD-10-CM

## 2025-01-30 DIAGNOSIS — N83.201: ICD-10-CM

## 2025-01-30 LAB — INTERNAL QC VALIDATED?: (no result)

## 2025-01-30 PROCEDURE — 93005 ELECTROCARDIOGRAM TRACING: CPT

## 2025-01-30 PROCEDURE — 0UT9FZZ RESECTION OF UTERUS, VIA NATURAL OR ARTIFICIAL OPENING WITH PERCUTANEOUS ENDOSCOPIC ASSISTANCE: ICD-10-PCS | Performed by: OBSTETRICS & GYNECOLOGY

## 2025-01-30 PROCEDURE — 88307 TISSUE EXAM BY PATHOLOGIST: CPT

## 2025-01-30 PROCEDURE — 86901 BLOOD TYPING SEROLOGIC RH(D): CPT

## 2025-01-30 PROCEDURE — 86850 RBC ANTIBODY SCREEN: CPT

## 2025-01-30 PROCEDURE — 81025 URINE PREGNANCY TEST: CPT

## 2025-01-30 PROCEDURE — 82962 GLUCOSE BLOOD TEST: CPT

## 2025-01-30 PROCEDURE — 58552 LAPARO-VAG HYST INCL T/O: CPT

## 2025-01-30 PROCEDURE — 83735 ASSAY OF MAGNESIUM: CPT

## 2025-01-30 PROCEDURE — 00840 ANES IPER PX LOWER ABD NOS: CPT

## 2025-01-30 PROCEDURE — 36415 COLL VENOUS BLD VENIPUNCTURE: CPT

## 2025-01-30 PROCEDURE — 86900 BLOOD TYPING SEROLOGIC ABO: CPT

## 2025-01-30 RX ADMIN — SODIUM CHLORIDE 15 ML: 9 INJECTION, SOLUTION INTRAVENOUS at 08:31

## 2025-01-30 RX ADMIN — SCOPOLAMINE 1 PATCH: 1.5 PATCH, EXTENDED RELEASE TRANSDERMAL at 08:30

## 2025-01-30 RX ADMIN — MAGNESIUM SULFATE HEPTAHYDRATE 408 GM: 500 INJECTION, SOLUTION INTRAMUSCULAR; INTRAVENOUS at 08:31

## 2025-01-30 RX ADMIN — LIDOCAINE HYDROCHLORIDE 30 ML: 10; .005 INJECTION, SOLUTION EPIDURAL; INFILTRATION; INTRACAUDAL; PERINEURAL at 10:58

## 2025-01-30 NOTE — PCM.PRE.AN2
ASA Classification*
ASA Classification
ASA Classification: 3

Assessment & Plan Anesthesia*
Anesthesia Assessment
Anesthesia Assessment: 

Discussed sedation and/or anesthesia options, risks, benefits, and alternatives with patient/parents/legal guardian/POA. Questions invited. 

The patient/parents/legal guardian/POA seems to understand and agrees to proceed with anesthesia plan.

Reviewed the physical assessment, medical history, allergy history and patient home medications list prior to surgery/procedure/anesthetic and documented any changes.

Performed airway and anesthesia risk assessments.

Anesthesia Type
Anesthesia Type: General
History Source
History Obtained from:: Patient and Chart

Anesthesia Focused Assessment*
Temperature: 98.4 F
Pulse Rate: 67
Blood Pressure: 137/68
Respiratory Rate: 16
Pulse Ox: 100
Oxygen Delivery Method: Room Air
Airway Assessment
Mouth opens: 2 cm
Mallampati Score: III
Teeth Condition: Caps/Crowns (Left lower molar has a crown.  It is tight.)
Neck Range of motion (ROM): Full ROM

Focused Labs
Anesthesia Preop lab: 
       *** CBC ***  
      WBC 7.5 K/mm3 (4.4-11.0)  10/18/24 16:08 10/18/24
      RBC 5.00 M/mm3 (4.2-5.4)  10/18/24 16:08 10/18/24
      Hgb 13.6 g/dL (12.0-15.0)  10/18/24 16:08 10/18/24
      Hct 42.6 % (37-47)  10/18/24 16:08 10/18/24
      Plt Count 317 K/mm3 (150-450)  10/18/24 16:08 10/18/24
       *** CHEMISTRY ***  
      Potassium 4.0 mmol/L (3.5-5.1)  10/18/24 16:08 10/18/24
      Sodium 138 mmol/L (136-145)  10/18/24 16:08 10/18/24
      Magnesium 2.5 mg/dL (1.6-2.6)  25 08:25
      BUN 13 mg/dL (7-18)  10/18/24 16:08 10/18/24
      Creatinine 0.75 mg/dL (0.55-1.02)  10/18/24 16:08 10/18/24
      Glucose 82 mg/dL ()  10/18/24 16:08 10/18/24
      POC Glucose 106 mg/dL ()  25 08:06 25
       *** COAG ***  
       *** PREGNANCY ***  
      Urine Pregnancy Test Negative Negative 25 08:02 25


Pre-Assessment
Diagnosis/Proposed Procedure
Planned Operative Procedure(s): (B) Hysterectomy,LAVH, bilateral salpingo oophorectomy, possible cystoscopy
Anesthesia History
Anesthesia History - RN Documentation: 
Anesthesia History - RN Documentation

Hx Hospitalization             No                           25 14:17
Any Problems With Anesthesia   No                           25 14:17
Cholinesterase deficiency      No                           25 14:17
You/Your Family Experience     No                           25 14:17
fever (hyperthermia) with       
Relationship                    
Recent Exposure to Contagious  No                           25 08:17
Disease                         
Does patient have nerve        No                           25 14:17
stimulator                      
Patient instructed to have      
device shut off                 
--Does patient have Pacemaker  No                           25 08:18
or ICD?                         
When Was Last Pacemaker Check   


*** QUESTION #4 FULL TEXT: You/Your Family Experience fever (hyperthermia) with Anesthesia

Last Oral Intake
Last Oral intake: 
Last Oral Intake

NPO since                      06:00                        25 08:18
Meds taken in AM with sips of  No                           25 08:18
water?                          
Meds patient instructed to      
take am of surgery              



Any additional information?: Yes NPO since: 06:00 (Patient took her preop Ensure at 6 AM.)
PONV
PONV - RN Documentation: 
PONV - RN Documentation

Female                         Yes                          25 14:17
HX of Motion Sickness          Yes                          25 14:17
HX of N/V After Surgery        No                           25 14:17
Non-Smoker                     Yes                          25 14:17
Duration of Surgery greater    Yes                          25 14:17
than 60 minutes                 
Number of Risk Factors         4                            25 14:17
PONV Score                     Severe Risk                  25 14:17



Height & Weight
Height & Weight: 
Anesthesia: Height & Weight

Height                         5 ft 3 in                    25 08:18
Weight:                        111.584 kg                   25 08:18
Body Mass Index (BMI)          43.5                         25 08:18



Respiratory Assessment
Respiratory Assessment - RN Documentation: 
Respiratory Tract Infection Hx - RN Documentation

Hx Respiratory Tract Infection No                           25 14:17



STOP Sleep Apnea
STOP Sleep Apnea - RN Documentation: 
STOP Sleep Apnea - RN Documentation

Hx Hypertension                No                           25 14:17
Hx Sleep Apnea                 No                           25 14:17
CPAP                            
BIPAP                           
Do you snore loudly (louder    No                           25 14:17
than talking or can be heard    
Do you often feel tired/       No                           25 14:17
fatigued/ sleepy during         
daytime?                        
Has anyone observed you stop   No                           25 14:17
breathing during sleep?         
STOP Results                   Negative                     25 14:17



*** QUESTION #5 FULL TEXT : Do you snore loudly (louder than talking or can be heard through closed doors)? 

Tobacco Use History
Tobacco Use History - RN Documentation: 
Tobacco Use History - RN Documentation

Tobacco Use                     
Smoking Status                 Never smoker                 25 14:17
Hx Tobacco Use                 No                           25 14:17
Years Smoking                   
Packs Smoked per Day            
Smoking Cessation Date was      
within the last 15 years        
Hx Smoking Cessation Date       
Hx Smoking Cessation            
Counseling                      



Hematologic Medial History
Hematologic Hx - RN Documentation: 
Hematologic Medical Hx - RN documentation

Hx of Blood Transfusion        No                           25 14:17
Hx of Transfusion in last 3    No                           25 14:17
Months                          
Date of Last Transfusion (if    
within last 3 months)           
Ever experience any problems   No                           25 14:17
with transfusion(s)?            
Specify any problems            
Hx of Preganancy in last 3     N/A                          25 14:17
Months                          
Nurse Filling Out Transfusion  NBUCHER                      25 14:17
& Pregnancy Questions:          
Date:                          25 14:17
Time:                          14:18                        25 14:17
Patient unable to answer at     
this time (ie. confused,        
unrespo                         



Pregnancy/Reproduction History
Pregnancy/Reproductive History - RN Documentation: 
Pregnancy/Reproductive Hx- RN Documentation

Hx Pregnant Now                No                           25 14:17
Gestational Age (in weeks):     
EDC:                            
Hx                       
Hx Para                         
Hx  Section             
SAB                             
Breastfeeding                  No                           25 14:17



Active Medications
Active Medications: 
Current Medications

Generic Name Dose Route Start Last Admin
  Trade Name Freq  PRN Reason Stop Dose Admin
Lactated Ringer's  1,000 mls @ 40 mls/hr  25 07:30 
  IV  
  .Q25H ELEANOR  
Lactated Ringer's  1,000 mls @ 40 mls/hr  25 08:30 
  IV  
  .Q25H ELEANOR  
Sodium Chloride  1,000 mls @ 15 mls/hr  25 08:30  25 08:31
  IV  25 21:49  15 mls/hr
  .Q48H ELEANOR   Administration
  Protocol  
Insulin Human Lispro  0 unit  25 07:30 
  Insulin Lispro 100 Unit/Ml Insuln.Pen  SC  25 13:30 
  Q4H PRN PRN  
  BG >/= 180, SEE PROTOCOL  
  Protocol  



PFSH
Medical History (Reviewed 25 @ 09:38 by Dr. Alok Goel MD)

Wears glasses
Depression
High cholesterol
Migraine headache
Non-smoker
History of kidney stones


Home Medications

?Medication ?Instructions ?Recorded ?Last Taken ?Type
aripiprazole 2 mg tablet 2 mg PO DAILY 10/17/24 01/29/25 06:30 History
atorvastatin 20 mg tablet 20 mg PO DAILY 10/17/24 01/29/25 06:30 History
sertraline 100 mg tablet 200 mg PO DAILY 10/17/24 01/29/25 06:30 History
norethindrone acetate 5 mg tablet 5 mg PO DAILY 25 06:30 History
magnesium citrate 100 mg tablet 100 mg PO DAILY 25 06:30 History




Allergy/AdvReac Type Severity Reaction Status Date / Time
tree nut Allergy Severe Anaphylaxis Verified 25 08:11


Surgical History (Reviewed 25 @ 09:38 by Dr. Alok Goel MD)

History of D&C (10/24/24)
History of appendectomy (~)


Social History (Reviewed 25 @ 09:38 by Dr. Alok Goel MD)
Smoking Status:  Never smoker 



Review of Systems (Anesthesia)
ROS Narrative
System reviewed and no additional complaints, except as documented. MED

## 2025-01-30 NOTE — PCM.POSTANE2
Anesthesia Postop Eval I Sum
Postop Eval Completion status
Anesthesia document: Postop Eval 1 completed: Yes
Anesthesia Postop Eval I Summary
Anesthesia Postop Eval I Summary: 
Anesthesia Postop Eval I:  Assessment Summary

Airway patent                  Yes                01/30/25 12:20 [email or web address]
Spontaneous unlabored          Yes                01/30/25 12:20 [email or web address]
respirations                     
Mental status                  Awake,Calm         01/30/25 12:20 [email or web address]
nausea                         No                 01/30/25 12:20 [email or web address]
Vomiting                       No                 01/30/25 12:20 [email or web address]


Anesthesia Postop Eval I: Fluid Summary

Crystalloid volume administer  1,200              01/30/25 12:20 [email or web address]
(ml)                             
Colloids volume administered (   
ml)                              
Blood Product volume             
administered (ml)                
Total IV fluid infused         1,200              01/30/25 12:20 CRNA.GDOTT


Anesthesia Postop Eval I: Summary Notes

Anesthesia Complication        No                 01/30/25 12:20 [email or web address]
Anesthesia Complication          
Comment:                         
Post-operative progress note     




Anesthesia: Postop Eval II
Evaluation
Mental status: Awake and Calm
Pain Level: 1
nausea: No
Vomiting: No
Complications
Anesthesia Complication: No

## 2025-01-30 NOTE — PCM.OPRPT
Problems
Associated Problem List Diagnoses
(1) Uterine fibroid: 
(2) Abnormal uterine bleeding:

Operative Report (Standard)
Operative Information
Date of Procedure: 01/30/25
Pre-Operative Diagnosis: AUB, uterine fibroids
Post-Operative Diagnosis: same
Surgery/Procedure Performed: LAVH, bilateral salpingoophorectomy
FIRST Assistant: Yes First Assistant: Lianna Salazar  
Tasks completed by first assist: Closing, Hemostasis: Electrocautery, Trocar and Retracting Additional assistant?: Yes Additional Assistant #2: SONYA Villela MS3  
Tasks completed by assistant #2: Closing and Retracting Additional assistant?: No
Type of Anesthesia: General
RN Documented Start/Stop Times: 



Operation Date: 01/30/25 09:40
Case Time  
Into Pre-Op 01/30/25 07:48
Out of Pre-Op 01/30/25 10:16
Anesthesia Start 01/30/25 10:20
Into Room 01/30/25 10:20
Procedure Start 01/30/25 10:39
Procedure End 01/30/25 12:03
Anesthesia End 01/30/25 12:15
Out of Room 01/30/25 12:15
Into Recovery 01/30/25 12:16



Procedure Start Time: 10:39
Procedure Stop Time: 12:03
Select all DRAINS/GRAFTS/IMPLANTS that apply: None
Special Medications: none
Estimated Blood Loss: 30
Fluids Replaced: 1200 cc LR
Specimen collected: Yes Description of specimen(s) removed: uterus, cervix, bilateral fallopian tubes and ovaries
Description of surgery: 

The patient was taken to the operating room where she was prepped and draped in the dorsal lithotomy position.  Her arms were tucked to the side and padded and her legs were placed in the yellowfin stirrups.  Care was taken to ensure that she was 
placed in a neurologically safe and neutral position.  A weighted speculum was placed in the vagina and the anterior lip of the cervix was grasped with a single-tooth tenaculum.  The uterus sounded to 8 centimeters.  The Erendira uterine manipulator was 
placed and secured. The Camacho catheter was placed to straight drain.

Attention was turned to the abdominal portion of the case.  Before skin incisions were made they were infiltrated with 0.5% Marcaine solution for local anesthetic.  A 5 mm intraumbilical incision was made and while tenting the anterior abdominal 
wall up with towel clamps a 5 mm blade less trocar and sleeve were advanced directly into the peritoneal cavit using the visible.  Peritoneal placement was confirmed with the laparoscope the pneumoperitoneum was created, and the underlying abdominal 
contents were intact.  The patient was placed in Trendelenburg and the above findings were noted.  Right and left lateral 5 mm trochars were placed under direct visualization without difficulty.

There were some filmy adhesions of colonic epiploica to the left round ligament and these were taken down with blunt and sharp dissection without difficulty.  There is also some filmy adhesions to the posterior cervix to some adipose tissue 
surrounding the colon and this was taken down without difficulty.  The ureters were identified and then the infundibulopelvic ligaments were clamped, sealed and transected with the LigaSure device and the pedicles were hemostatic.  The round 
ligaments were clamped sealed and transected and a window was made in the peritoneum.  

The bladder flap was dissected down with the LigaSure device and blunt dissection and the uterine arteries were then skeletonized.  The uterine arteries were clamped, sealed and transected on both sides with the LigaSure device.   At this point the 
pedicles were all examined and found to be hemostatic.  Attention was turned to the vaginal portion of the case.  

1% lidocaine with dilute epinephrine solution was used to infiltrate the vaginal epithelium around the cervix.  An incision was made from around the entire vaginal epithelium and it was dissected back with blunt and sharp dissection.  The anterior 
colpotomy incision was made.  The posterior cul-de-sac was entered sharply.  The uterosacral ligaments were clamped with Deann clamps, transected and suture-ligated.  The lower part of the cardinal ligament was clamped with a Deann clamp, 
transected and suture-ligated.  There is a small amount of peritoneum left on the right side that was clamped with a Deann clamp, transected and suture-ligated.  Hemostasis was noted.

The uterus was brought out through the colpotomy incision.  There is small amount of bleeding near the left vaginal cuff angle and a figure-of-eight 0 Vicryl suture was placed there for hemostasis.  A modified Paul's stitch was then placed with 
the 2-0 PDS going through the posterior peritoneum reefed across to the right uterosacral, back across the left uterosacral and then backed out through the midline.  The posterior cuff was then run with a 2-0 Vicryl running locked suture.  The 
vaginal cuff was then closed in a horizontal fashion with interrupted 0 Vicryl figure-of-eight sutures.  Care was taken to secure the vagina to the uterosacral ligaments.

A sponge stick was placed in the vagina to help place traction against the vaginal cuff. 

The laparoscope was reinserted into the abdomen and the pneumoperitoneum was re-created.  The pedicles were reexamined and found to be hemostatic.  The vaginal cuff was hemostatic.  Hemoblast was placed over the cuff and pedicles and no active 
bleeding was noted.  The ureters were seen peristalsing bilaterally well below the pedicles at the end of the case.  The right and left lateral ports were taken out and the sites were hemostatic.  The pneumoperitoneum was released and even under low 
pressure there was no bleeding of any of the pedicles are vaginal cuff.  The umbilical port was removed.

The umbilical skin incisions were closed with Monocryl suture and skin glue by Dr. Ruiz.  The vaginal instruments were removed by me and a vaginal sweep was completed by me.

The surgery was performed by me with assistance.  There were no qualified residents available for this procedure.  All sponge lap and needle counts were correct and the patient was transferred to the recovery room in stable condition.
Surgical Findings: 
boggy utuers, normal tubes and ovaries
Complications
Complications: No
Admit VTE Documentation
VTE Present on Admission: No
VTE Mechan Device Prophylaxis: SCD's
VTE Pharm Prophylaxis ordered?: No

## 2025-01-30 NOTE — PCM.DC
Discharge Instructions
Diet
Discharge Diet: Light diet - advance as tolerated
DC O2, CPAP, BIPAP needs
Home O2 Discharge instructions: No
Dressing / Incision
Return to work on:: 02/24/25
May shower in (days): 1
May resume sexual activity in: 6-8 weeks and - (Nothing in your vagina for 6 weeks.  No vaginal or anal intercourse for 6-8 weeks)
Dressing / Incision
Call your doctor if your incision/area has: Continuous Slow Oozing, Sudden Increased Bleeding and Foul Smelling Discharge
Call your doctor if you observe: Fever of 101 or Higher and Using more than 1 pad per hour
Cleanse incision/area with: Soap & Water and - (Your incisions have skin glue, it can get wet, leave the glue on until it falls off.  )
Follow Up Care
Please Follow Up With: Radha Acosta MD
When: With my office in 1-2 and 6 weeks or as needed.  [phone]
Test Results: 
Test results from this visit will be discussed in further detail at your follow-up appointment, if applicable.


Discharge Plan
Admission
Primary Reason for Your Visit: Hysterectomy

Attending Provider: Radha Acosta

Primary Care Provider: Janie Parish

Instructions
Print Language: English

Discharge Orders/Prescriptions
Prescriptions:
New
  ibuprofen 600 mg tablet 
   600 mg PO Q6H PRN (Reason: Pain) 20 Days Qty: 40 0RF
  oxycodone 5 mg tablet 
   5 mg PO Q8H PRN PRN (Reason: severe pain) 7 Days Qty: 6 0RF

Continued
  atorvastatin 20 mg tablet 
   20 mg PO DAILY 
  sertraline 100 mg tablet 
   200 mg PO DAILY 
  aripiprazole 2 mg tablet 
   2 mg PO DAILY 
  magnesium citrate 100 mg tablet 
   100 mg PO DAILY 

Discontinued
  norethindrone acetate 5 mg tablet 
   5 mg PO DAILY 

Other Ambulatory Orders:
12 Lead EKG  (Routine)
   Location: None Selected
   Ordered By:  Dr. Alvarado Martinez

Referrals / Follow Up:
Janie Parish MD [Primary Care Provider] - 

Disposition
Disposition (needs filled in before D/C Order can be placed): Home, Self Care

## 2025-01-30 NOTE — HYST_PTH
PATHOLOGY RESULTS

PATIENT: CASSI DE LA TORRE      ACCT #:[id number]  LOC: [facility] [place]        U#:[id number]
                                       AGE/SX: 54/F         ROOM:           RE2025
REG DR: Dr. Radha Acosta MD        : 1970      BED:            DIS: 2025

--------------------------------------------------------------------------------------------

SPEC #:          RECD: 25 14:45    STATUS:  CORINNE ROSENTHAL #: [id number]
                        ENRRIQUE: 25 09:40    SUBM DR: Radha Acosta

DEPT: SURGICAL PATHOLOGY                        RECD BY: Albert Diaz

ENTERED: 25 08:32 SP TYPE: HYSTERECT  OTHR DR: Dr. Janie Parish MD

Tissues:    Uterus, NOS
Procedures: Surgery Specimen Level V

--------------------------------------------------------------------------------------------

HEADER

OPERATION: Hysterectomy, LAVH, bilateral salping-oopherectomy  
PRE-OP DIAGNOSIS: Abnormal uterine bleeding, uterine fibroid  
TISSUE SUBMITTED: Uterus, cervix, bilateral ovaries and fallopian tubes  

--------------------------------------------------------------------------------------------

MICROSCOPIC DIAGNOSIS

Uterus, cervix, bilateral ovaries and fallopian tubes, hysterectomy, bilateral 
salping-oopherectomy:  
             Cervix - Mild chronic inflammation squamous metaplasia.  
             Endometrium - Weakly proliferative endometrium.  
             Myometrium - Adenomyosis.  
    Bilateral fallopian tubes- No pathologic diagnosis.  
    Right ovary- Physiologic hemorrhagic cyst.  
    Left ovary- Mesothelial inclusion cysts.  
    Left paratubal cysts.  
   
. 2/3/2025

--------------------------------------------------------------------------------------------

MICROSCOPIC DESCRIPTION

Slides are reviewed.  

--------------------------------------------------------------------------------------------

GROSS DESCRIPTION

Received in fixative is one container labeled with the patient's name and designated 
 uterus, cervix, bilateral ovaries and fallopian tubes.   The specimen consists of a 
hysterectomy specimen consisting of uterus with cervix and attached bilateral fallopian 
tubes and ovaries.  The uterus with cervix weighs 125 gm and measures 9.5 x 8 x 6 cm.  The 
serosal surface is tan glistening.  The ectocervical mucosa is unremarkable.  The external 
os is oval in contour.  The endocervical canal measures 3 cm in length and the endocervical 
mucosa is tan glistening and unremarkable.  The triangular endometrial cavity measures 5 cm 
in length and 2.5 cm in width.  The endometrium is tan, glistening without any mass lesions 
and measures 0.1 cm in thickness. Sections of the uterine wall do not reveal any mass 
lesions and measures up to 2.5cm in thickness. Right fallopian tube measures 5.5cm in length 
and 0.6cm in diameter. Fimbrial end is identified. Right ovary measures 2.5 x 1.5 x 1.3cm. 
Sections reveal a hemorrhagic cyst measuring 0.6cm in greatest dimension. The left fallopian 
tube has similar appearance to right and measures 5.5cm in length and 0.6cm in diameter. 
Fimbrial end is identified. Two paratubal cysts are noted measuring 0.6 and 2.5cm in 
greatest dimensions and filled with clear fluid. The left ovary has similar appearance as to 
right and measures 2 x 2 x 1.2cm. Sections reveal unremarkable cut surfaces. No tuboovarian 
adhesions are identified on right or left side.  Representative sections are submitted in 
ten cassettes as follows:  1 - anterior cervix, 2 - posterior cervix, 3 & 4 - anterior 
uterine wall, 5 & 6 - posterior uterine wall, 7- right fallopian tube, 8- right ovary, 9- 
left fallopian tube and paratubal cysts, 10- left ovary.  
SJ: 2025  
TC:5  
CPT: 12315

## 2025-01-30 NOTE — POSTOP.ANE_ITS
Anesthesia: Postop Eval I    
Current Vital Signs    
Temperature: 97.9 F    
Pulse Rate: 80    
Blood Pressure: 134/78    
Respiratory Rate: 18    
Pulse Ox: 93    
Oxygen Delivery Method: Nasal Cannula    
Oxygen Flow Rate (L/min): 3    
Assessment    
Airway patent: Yes    
Spontaneous unlabored respirations: Yes    
Mental status: Awake and Calm    
nausea: No    
Vomiting: No    
Anesthesia Complication: No    
Fluid Hydration    
Crystalloid volume administer (ml): 1,200    
Total IV fluid infused: 1,200    
Progress Note    
Anesthesia document: Postop Eval 1 completed: Yes

## 2025-01-30 NOTE — OP.PCM_ITS
Problems    
Associated Problem List Diagnoses    
(1) Uterine fibroid:     
(2) Abnormal uterine bleeding:    
    
Operative Report (Standard)    
Operative Information    
Date of Procedure: 01/30/25    
Pre-Operative Diagnosis: AUB, uterine fibroids    
Post-Operative Diagnosis: same    
Surgery/Procedure Performed: LAVH, bilateral salpingoophorectomy    
FIRST Assistant: Yes First Assistant: Lianna Salazar      
Tasks completed by first assist: Closing, Hemostasis: Electrocautery, Trocar and  
Retracting Additional assistant?: Yes Additional Assistant #2: SONYA Villela MS3  
     
Tasks completed by assistant #2: Closing and Retracting Additional assistant?:   
No    
Type of Anesthesia: General    
RN Documented Start/Stop Times:     
    
                                            
    
                         Operation Date: 01/30/25 09:40    
    
    
Case Time      
    
Into Pre-Op 01/30/25 07:48    
    
Out of Pre-Op 01/30/25 10:16    
    
Anesthesia Start 01/30/25 10:20    
    
Into Room 01/30/25 10:20    
    
Procedure Start 01/30/25 10:39    
    
Procedure End 01/30/25 12:03    
    
Anesthesia End 01/30/25 12:15    
    
Out of Room 01/30/25 12:15    
    
Into Recovery 01/30/25 12:16    
    
    
    
    
Procedure Start Time: 10:39    
Procedure Stop Time: 12:03    
Select all DRAINS/GRAFTS/IMPLANTS that apply: None    
Special Medications: none    
Estimated Blood Loss: 30    
Fluids Replaced: 1200 cc LR    
Specimen collected: Yes Description of specimen(s) removed: uterus, cervix,   
bilateral fallopian tubes and ovaries    
Description of surgery:     
    
The patient was taken to the operating room where she was prepped and draped in   
the dorsal lithotomy position.  Her arms were tucked to the side and padded and   
her legs were placed in the yellowfin stirrups.  Care was taken to ensure that   
she was placed in a neurologically safe and neutral position.  A weighted specu  
lum was placed in the vagina and the anterior lip of the cervix was grasped with  
a single-tooth tenaculum.  The uterus sounded to 8 centimeters.  The Erendira   
uterine manipulator was placed and secured. The Camacho catheter was placed to   
straight drain.    
    
Attention was turned to the abdominal portion of the case.  Before skin   
incisions were made they were infiltrated with 0.5% Marcaine solution for local   
anesthetic.  A 5 mm intraumbilical incision was made and while tenting the   
anterior abdominal wall up with towel clamps a 5 mm blade less trocar and sleeve  
were advanced directly into the peritoneal cavit using the visible.  Peritoneal   
placement was confirmed with the laparoscope the pneumoperitoneum was created,   
and the underlying abdominal contents were intact.  The patient was placed in   
Trendelenburg and the above findings were noted.  Right and left lateral 5 mm   
trochars were placed under direct visualization without difficulty.    
    
There were some filmy adhesions of colonic epiploica to the left round ligament   
and these were taken down with blunt and sharp dissection without difficulty.    
There is also some filmy adhesions to the posterior cervix to some adipose   
tissue surrounding the colon and this was taken down without difficulty.  The   
ureters were identified and then the infundibulopelvic ligaments were clamped,   
sealed and transected with the LigaSure device and the pedicles were hemostatic.  
 The round ligaments were clamped sealed and transected and a window was made in  
the peritoneum.      
    
The bladder flap was dissected down with the LigaSure device and blunt   
dissection and the uterine arteries were then skeletonized.  The uterine   
arteries were clamped, sealed and transected on both sides with the LigaSure   
device.   At this point the pedicles were all examined and found to be   
hemostatic.  Attention was turned to the vaginal portion of the case.      
    
1% lidocaine with dilute epinephrine solution was used to infiltrate the vaginal  
epithelium around the cervix.  An incision was made from around the entire   
vaginal epithelium and it was dissected back with blunt and sharp dissection.    
The anterior colpotomy incision was made.  The posterior cul-de-sac was entered   
sharply.  The uterosacral ligaments were clamped with Deann clamps, transected   
and suture-ligated.  The lower part of the cardinal ligament was clamped with a   
Deann clamp, transected and suture-ligated.  There is a small amount of   
peritoneum left on the right side that was clamped with a Deann clamp,   
transected and suture-ligated.  Hemostasis was noted.    
    
The uterus was brought out through the colpotomy incision.  There is small   
amount of bleeding near the left vaginal cuff angle and a figure-of-eight 0   
Vicryl suture was placed there for hemostasis.  A modified Paul's stitch was   
then placed with the 2-0 PDS going through the posterior peritoneum reefed   
across to the right uterosacral, back across the left uterosacral and then   
backed out through the midline.  The posterior cuff was then run with a 2-0   
Vicryl running locked suture.  The vaginal cuff was then closed in a horizontal   
fashion with interrupted 0 Vicryl figure-of-eight sutures.  Care was taken to   
secure the vagina to the uterosacral ligaments.    
    
A sponge stick was placed in the vagina to help place traction against the   
vaginal cuff.     
    
The laparoscope was reinserted into the abdomen and the pneumoperitoneum was re-  
created.  The pedicles were reexamined and found to be hemostatic.  The vaginal   
cuff was hemostatic.  Hemoblast was placed over the cuff and pedicles and no   
active bleeding was noted.  The ureters were seen peristalsing bilaterally well   
below the pedicles at the end of the case.  The right and left lateral ports   
were taken out and the sites were hemostatic.  The pneumoperitoneum was released  
and even under low pressure there was no bleeding of any of the pedicles are   
vaginal cuff.  The umbilical port was removed.    
    
The umbilical skin incisions were closed with Monocryl suture and skin glue by   
Dr. Ruiz.  The vaginal instruments were removed by me and a vaginal sweep   
was completed by me.    
    
The surgery was performed by me with assistance.  There were no qualified   
residents available for this procedure.  All sponge lap and needle counts were   
correct and the patient was transferred to the recovery room in stable   
condition.    
Surgical Findings:     
boggy utuers, normal tubes and ovaries    
Complications    
Complications: No    
Admit VTE Documentation    
VTE Present on Admission: No    
VTE Mechan Device Prophylaxis: SCD's    
VTE Pharm Prophylaxis ordered?: No

## 2025-01-30 NOTE — XMS RPT_ITS
Comprehensive CCD (C-CDA v2.1)  
  
                          Created on: 2025  
  
  
Cassi De La Torre  
External Reference #: CDR,PersonID:[id number]  
: 1970  
Sex: Female  
  
Demographics  
  
  
                                        Address             [address] [place]  [address]  
   
                                        Home Phone          [phone]  
   
                                        Mobile Phone        [phone]  
   
                                        Preferred Language  en  
   
                                        Marital Status        
   
                                        Christian Affiliation Unknown  
   
                                        Race                White  
   
                                        Ethnic Group        Not  or Lati  
no  
  
  
Author  
  
  
                                        Organization        [facility]  
  
  
Care Team Providers  
  
  
                                Care Team Member Name Role            Phone  
   
                                Jeni, Carmen D Attending       Unavailable  
   
                                Godfrey, Cody  Primary Care    Unavailable  
   
                                [place], Carmen D Admitting       Unavailable  
   
                                Amos James  Consulting      Unavailable  
   
                                Maya Russell Admitting       Unavailable  
   
                                WoodMaya Attending       Unavailable  
   
                                Godfrey, Cody  Primary Care    Unavailable  
   
                                [place], Carmen D Admitting       Unavailable  
   
                                Jeni, Carmen D Attending       Unavailable  
   
                                Godfrey, Cody  Primary Care    Unavailable  
   
                                Jeni, Carmen D Attending       Unavailable  
   
                                Godfrey, Cody  Primary Care    Unavailable  
   
                                [place], Carmen D Admitting       Unavailable  
   
                                Jeni, Carmen D Attending       Unavailable  
   
                                Godfrey, Cody  Primary Care    Unavailable  
   
                                Jeni, Carmen D Admitting       Unavailable  
   
                                [place], Carmen D Attending       Unavailable  
   
                                Godfrey, Cody  Primary Care    Unavailable  
   
                                [place], Carmen D Admitting       Unavailable  
   
                                [place], Carmen D Attending       Unavailable  
   
                                Godfrey, Cody  Primary Care    Unavailable  
   
                                July Alfredo Unavailable     [phone]  
   
                                Unavailable     Unavailable     [phone]  
   
                                Janie Parish Unavailable     [phone]  
   
                                Unavailable     Unavailable     [phone]  
   
                                Janie Parish DO Primary Care Provider [phone]  
   
                                Dr. Janie Parish Primary Care    Unavailabl  
ewelina Parish, Dr. Janie JON Attending       UnavailAntonieta Golden  Attending       Unavailable  
   
                                Antonieta Baker  Referring       Unavailable  
   
                                Dr. Janie Parish Primary Care    Unavaildunia Parish, Dr. Janie JON Primary Care    Unavaildunia Parish, Dr. Janie JON Attending       Unavailabl  
ewelina Parish, Dr. Janie JON Referring       Unavaildunia Parish, Dr. Janie JON Primary Care    UnavailMD ANTONIETA Golden Attending       MD ANTONIETA Bell Referring       Unava  
mainor Parish, Dr. Janie JON Primary Care    Unavaildunia BOBA, MD ANTONIETA CULLEN Attending       Unava  
ilable  
   
                                BAKER, MD ANTONIETA CULLEN Referring       Unava  
ilable  
   
                                BAKER, MD ANTONIETA CULLEN Referring       Unava  
ilable  
   
                                [place], Dr. Janie JON Primary Care    Unavailabl  
e  
   
                                DANIELLE, MD ANTONIETA CULLEN Attending       Unava  
ilable  
   
                                [place], Dr. Janie JON Primary Care    Unavailabl  
ewelina BAKER, MD ANTONIETA CULLEN Attending       Unava  
ilable  
   
                                [place], Dr. Janie JON Primary Care    Unavailabl  
e  
   
                                Royal, Dr. Janie JON Attending       Unavailabl  
e  
   
                                Royal, Dr. Janie JON Referring       Unavailabl  
e  
   
                                ISAMARSHEILAA SABRY Attending       Unavailable  
   
                                ROYAL, JANIE S. Primary Care    Unavailable  
   
                                ISAMAR, JULIETH SABRY Admitting       Unavailable  
   
                                ROYAL, JANIE S. Primary Care    Unavailable  
   
                                ISAMAR, JULIETH SABRY Referring       Unavailable  
   
                                ISAMARJULIETH SABRY Attending       Unavailable  
   
                                ROYAL, JANIE S. Primary Care    Unavailable  
   
                                ISAMAR, JULIETH SABRY Referring       Unavailable  
   
                                ISAMAR JULIETH SABRY Admitting       Unavailable  
   
                                ROYAL, JANIE S. Primary Care    Unavailable  
   
                                ROYAL, JANIE S Attending       Unavailable  
   
                                ROYAL, JANIE S Primary Care    Unavailable  
   
                                ROYAL, JANIE S Attending       Unavailable  
   
                                ROYAL, JANIE S Referring       Unavailable  
   
                                ROYAL, JANIE S Primary Care    Unavailable  
   
                                ROYAL, JANIE S Attending       Unavailable  
   
                                ROYAL, JANIE S Referring       Unavailable  
   
                                ROYAL, JANIE S Primary Care    Unavailable  
   
                                ROYAL, JANIE S Primary Care    Unavailable  
   
                                ROYAL, JANIE S Primary Care    Unavailable  
   
                                Unavailable     Primary Care Provider Unavailabl  
e  
   
                                [place] DOSariJanie S Primary Care Provider [phone]  
   
                                ROYAL, JANIE S Referring       Unavailable  
   
                                ROYAL, JANIE S Primary Care    Unavailable  
   
                                ROYAL, JANIE S Primary Care    Unavailable  
   
                                KEVYN ROSEN Attending       Unavailable  
   
                                DEVON RUIZ Attending       Unavailable  
   
                                DEVON RUIZ Referring       Unavailable  
   
                                DEVON RUIZ Attending       Unavailable  
   
                                SARA DEVON Referring       Unavailable  
   
                                DEVON RUIZ Attending       Unavailable  
   
                                HOUSTON FREEMAN    Referring       Unavailable  
   
                                HOUSTON FREEMAN    Attending       Unavailable  
  
  
  
Allergies  
  
  
                                                    Allergy   
Classification                          Reported   
Allergen(s)               Allergy Type              Date of   
Onset                     Reaction(s)               Facility  
   
                                                    Nuts (not including   
peanuts)  
(1 source)                              tree nut,   
unspecified     Food Allergy                    Anaphylaxis     [facility]  
Work Phone:   
[phone]  
   
                                                      
(1 source)                              tree nut,   
unspecified;   
Translations:   
[Tree Nuts]                             Propensity to   
adverse   
reactions to   
drug (disorder)                                             [facility]   
Repository  
   
                                                      
(1 source)                              No Known   
Medication   
Allergies;   
Translations:   
[No Known   
Medication   
Allergies]                              Propensity to   
adverse   
reactions to   
drug (disorder)                                             [facility]   
Repository  
   
                                                      
(20 sources)                            tree nut,   
unspecified                             Allergy to   
substance   
(finding)                               Anaphylaxis         [facility]  
nd  
Work Phone:   
[phone]  
   
                                                      
(16 sources)                            Nut -   
Unspecified;   
Translations:   
[NUT -   
UNSPECIFIED]                            Allergy to   
substance                                 
3                         Anaphylaxis               [facility]  
   
                                                      
(9 sources)                             Other;   
Translations:   
[OTHER]                                 Propensity to   
adverse   
reactions                                 
3                         Unknown                   [facility]  
  
  
  
Medications  
Current Medications  
  
  
  
                      Medication Drug Class(es) Dates      Sig (Normalized) Sig   
(Original)  
   
                                                    [id number]   
actuat albuterol   
0.09 mg/actuat   
metered dose   
inhaler  
(19 sources)                            beta2-Adrenergic   
Agonist                                 Start:   
2024  
End:   
2025                              take 2 puff(s) by   
inhalation every   
six hours for   
wheezing                                albuterol 90   
mcg/actuation   
inhaler   
Indications: Acute   
bronchitis,   
unspecified   
organism Inhale 2   
puffs every 6   
hours if needed   
for wheezing. 18 g   
2024 Active  
  
  
  
                                        Start: 2024   take 2 puff(s) by in  
halation   
every four hours for wheezing           albuterol 90 mcg/actuation inhaler   
Indications: Acute bronchitis,   
unspecified organism Inhale 2 puffs   
every 4 hours if needed for   
wheezing or shortness of breath. 18   
g 1 2024 Active  
   
                                        Start: 2023   take 2 puff(s) by in  
halation   
every four hours as needed              albuterol HFA (PROVENTIL HFA,   
VENTOLIN HFA) 90 mcg/actuation   
inhaler Inhale 2 puffs every 4   
hours by inhalation route as   
needed. 2023 Active  
   
                                                    Start: 2023  
End: 2024                         take 2 puff(s) by inhalation   
every four hours for wheezing           albuterol 90 mcg/actuation inhaler   
Inhale 2 puffs every 4 hours if   
needed for wheezing or shortness of   
breath. 0 2023   
Discontinued (Reorder)  
  
  
  
                                                    ARIPiprazole 2 mg oral   
tablet  
(20 sources)    Atypical Antipsychotic Start: 2024                 ARIPipr  
azole   
(ABILIFY) 2 mg tablet   
Take 2 mg by mouth.   
2024 Active  
  
  
  
                                        Start: 2024   take 1 tablet by juliano  
th once   
daily                                   ARIPiprazole (Abilify) 2 mg tablet   
Indications: Depression, major,   
single episode, mild (CMS/HCC) Take   
1 tablet (2 mg) by mouth once daily.   
90 tablet 1 2024 Active  
   
                                                    Start: 2024  
End: 2024                         take 1 tablet by mouth once   
daily                                   ARIPiprazole (Abilify) 2 mg tablet   
Indications: Depression, major,   
single episode, mild (CMS/HCC) take   
1 tablet by mouth once daily 90   
tablet 1 2024   
Discontinued (Reorder)  
   
                                                    Start: 07-  
End: 2023                         take 1 tablet by mouth once   
daily                                   ARIPiprazole (Abilify) 2 mg tablet   
Indications: Depression, major,   
single episode, mild (CMS/HCC) Take   
1 tablet (2 mg) by mouth once daily.   
90 tablet 1 2023 Active  
   
                                                    Start: 2023  
End: 2023                         take 1 tablet by mouth once   
daily                                   ARIPiprazole (Abilify) 2 mg tablet   
Indications: Depression, major,   
single episode, mild (CMS/HCC) Take   
1 tablet (2 mg) by mouth once daily.   
30 tablet 0 2023   
Active  
  
  
  
                                                    atorvastatin 20 mg oral   
tablet  
(20 sources)                            HMG-CoA Reductase   
Inhibitor           Start: 2024                       atorvastatin (LIPITO  
R)   
20 mg tablet Take 20   
mg by mouth.   
2024 Active  
  
  
  
                                        Start: 2024   take 1 tablet by juliano  
th once   
daily                                   atorvastatin (Lipitor) 20 mg tablet   
Indications: Mixed hyperlipidemia   
Take 1 tablet (20 mg) by mouth once   
daily. 90 tablet 1 2024 Active  
   
                                                    Start: 2024  
End: 2024                         take 1 tablet by mouth once   
daily                                   atorvastatin (Lipitor) 20 mg tablet   
Indications: Mixed hyperlipidemia   
take 1 tablet by mouth once daily 90   
tablet 1 2024   
Discontinued (Reorder)  
   
                                                    Start: 2022  
End: 2023                         take 1 tablet by mouth once   
daily                                   atorvastatin (Lipitor) 20 mg tablet   
Indications: Mixed hyperlipidemia   
Take 1 tablet (20 mg) by mouth once   
daily. 90 tablet 1 2023 Active  
  
  
  
                                                    azithromycin 250 mg   
oral tablet  
(2 sources)                             Macrolide   
Antimicrobial                           Start: 2024  
End: 2024                                     azithromycin   
(Zithromax Z-Florentin) 250   
mg tablet   
Indications: Acute   
bronchitis,   
unspecified organism   
Take 2 tablets (500   
mg) on Day 1,   
followed by 1 tablet   
(250 mg) once daily   
on Days 2 through 5.   
6 tablet 2024 Active  
  
  
  
                                                    Start: 2024  
End: 2024                                     azithromycin (Zithromax) 250  
 mg tablet Indications: Acute   
bronchitis, unspecified organism Take 2 tablets (500 mg) by mouth   
once daily for 1 day, THEN 1 tablet (250 mg) once daily for 4   
days. Take 2 tabs (500 mg) by mouth today, than 1 daily for 4   
days.. 6 tablet 0 2024 Active  
  
  
  
                                                    benzonatate 200 mg   
oral capsule  
(3 sources)                             Non-narcotic   
Antitussive                             Start: 2024  
End: 2024                         take 1   
capsule by   
mouth three   
times daily   
as needed for   
cough                                   benzonatate   
(Tessalon) 200 mg   
capsule   
Indications: Acute   
bronchitis,   
unspecified   
organism Take 1   
capsule (200 mg)   
by mouth 3 times a   
day as needed for   
cough. 20 capsule   
0 2024   
Discontinued (Med   
List Cleanup)  
  
  
  
                                                    Start: 2023  
End: 2024                         take 1 capsule by mouth three   
times daily as needed for cough         benzonatate (Tessalon) 200 mg   
capsule Take 1 capsule (200 mg) by   
mouth 3 times a day as needed for   
cough. 0 2023   
Discontinued (Reorder)  
  
  
  
                                                    Ethinyl   
Estradiol /   
Levonorgestrel  
(5 sources)                             Progestin, Estrogen,   
Progestin-containing   
Intrauterine Device                     Start:   
2022  
End:   
2024                              take 1   
tablet   
by   
mouth   
once   
daily                                   L   
norgest/e.estradioL-e.estrad   
0.1 mg-20 mcg (84)/10 mcg (7)   
tablets,dose pack,3 month   
Take by mouth 1 (one) time   
each day. Until finished 0   
2022   
Discontinued (Med List   
Cleanup)  
  
  
  
                                        Start: 2022   take 1 tablet by juliano  
th   
once daily                              L norgest/e.estradioL-e.estrad 0.1 mg-20  
 mcg   
(84)/10 mcg (7) tablets,dose pack,3 month   
Take by mouth 1 (one) time each day. Until   
finished 0 2022 Active  
  
  
  
                                                    hydrOXYzine   
hydrochloride 25 mg   
oral tablet  
(4 sources)               Antihistamine             Start:   
2023  
End:   
2024                              take 1   
tablet by   
mouth   
three   
times   
daily                                   hydrOXYzine HCL   
(Atarax) 25 mg tablet   
Indications: Anxiety   
Take 1 tablet (25 mg)   
by mouth 3 times a   
day. 30 tablet 2   
2023   
Discontinued (Med   
List Cleanup)  
   
                                                    inhalational spacing   
device (Aerochamber   
MV) inhaler  
(1 source)                                          Start:   
2024  
End:   
2025                                          inhalational spacing   
device (Aerochamber   
MV) inhaler   
Indications: Acute   
bronchitis,   
unspecified organism   
Use as instructed 1   
each 2024 Active  
   
                                                    Magnesium  
(3 sources)                                                     MAGNESIUM ORAL T  
dayday   
by mouth. Active  
   
                                                    methylPREDNISolone  
(1 source)                Corticosteroid            Start:   
2024  
End:   
2024                                          methylPREDNISolone   
(Medrol Dospak) 4 mg   
tablets Indications:   
Acute bronchitis,   
unspecified organism   
Take as directed on   
package. 21 tablet 0   
2024   
Active  
   
                                                    norethindrone acetate   
5 mg oral tablet  
(6 sources)                                         Start:   
2025  
End:   
01-                                          norethindrone   
(AYGESTIN) 5 mg   
tablet Indications:   
Abnormal uterine   
bleeding 1 tab 3x/day   
until bleeding stops.   
1 tab 2x/day x 2   
days. 1 tab daily x 5   
days 35 tablet   
01/10/2025 Active  
   
                                                    pimecrolimus 10 mg/ml   
topical cream  
(1 source)                              Calcineurin   
Inhibitor   
Immunosuppressant                       Start:   
2023  
End:   
2023                                          pimecrolimus (Elidel)   
1 % cream   
Indications: Eczema,   
unspecified type   
Apply topically 2   
times a day. 30 g 2   
2023   
Active  
   
                                                    predniSONE 10 mg oral   
tablet  
(1 source)                                          Start:   
2024  
End:   
2024                              take 3   
tablets by   
mouth once   
daily                                   predniSONE   
(Deltasone) 10 mg   
tablet Indications:   
Acute bronchitis,   
unspecified organism   
Take 3 tablets (30   
mg) by mouth once   
daily for 5 days. 15   
tablet 2024 Active  
   
                                                    sertraline 100 mg   
oral tablet  
(20 sources)                            Serotonin Reuptake   
Inhibitor                               Start:   
2022  
End:   
2024                                          sertraline (ZOLOFT)   
100 mg tablet Take   
200 mg by mouth.   
2024 Active  
  
  
  
                                                            take 1 tablet by juliano  
th once daily, then   
take 2 tablets by mouth once daily      sertraline (Zoloft) 100 mg tablet Take 1  
   
tablet (100 mg) by mouth once daily. Take 2   
tablets daily 0 Active  
   
                                                take 2 tablets by mouth once stephan  
ly Sertraline HCl - 100 MG Oral Tablet TAKE 2   
TABLET Daily Quantity: 180 Refills: 3   
Ordered: 20-Dec-2021 Janie Parish DO   
Active  
  
  
  
                                                    SUMAtriptan 50 mg oral   
tablet  
(20 sources)                            Serotonin-1b and   
Serotonin-1d Receptor   
Agonist                                 Start: 2023  
End: 2024                                     SUMAtriptan (IMITREX)   
50 mg tablet   
2023 Active  
  
  
  
                                                take 1 tablet by mouth every two  
 hours SUMAtriptan Succinate 50 MG Oral Tablet   
TAKE   
1 TABLET FOR MIGRAINE RELIEF. MAY REPEAT   
EVERY 2 HOURS. MAX 200MG/DAY. Quantity: 24   
Refills: 1 Ordered: 16-Dec-2022 Janie Parish DO Active  
  
  
  
Completed/Discontinued Medications  
  
  
  
                      Medication Drug Class(es) Dates      Sig (Normalized) Sig   
(Original)  
   
                                                    cetirizine   
hydrochloride 10 mg   
oral capsule  
(20 sources)                            Histamine-1   
Receptor   
Antagonist                                
End:   
2025                                          Cetirizine 10 mg   
cap Take 10 mg by   
mouth. 2025   
Discontinued  
  
  
  
                                                                ZyrTEC Allergy 1  
0 MG Oral Capsule Quantity: 0 Refills: 0 Ordered: 2019   
DO   
Active  
  
  
  
                                                    Levonorgest-Eth Estrad   
91-Day 0.1-0.02 & 0.01 MG   
Oral Tablet  
(20 sources)                            Start: 2022   take 1 tablet by   
mouth once daily                        Levonorgest-Eth Estrad   
91-Day 0.1-0.02 & 0.01   
MG Oral Tablet TAKE 1   
TABLET DAILY UNTIL   
FINISHED. Quantity: 1   
Refills: 3 Ordered:   
2022 Antonieta Baker MD Start : 2022   
Active  
  
  
  
                                        Start: 2022   take 1 tablet by juliano  
th once   
daily                                   Levonorgest-Eth Estrad 91-Day 0.1-0.02   
& 0.01 MG Oral Tablet TAKE 1 TABLET   
DAILY UNTIL FINISHED. Quantity: 1   
Refills: 3 Ordered: 2022 Antonieta Baker DO Start : 2022 Active  
   
                                                    Start: 2022  
End: 2022                         take 1 tablet by mouth once   
daily                                   Levonorgest-Eth Estrad 91-Day 0.1-0.02   
& 0.01 MG Oral Tablet TAKE 1 TABLET   
DAILY UNTIL FINISHED. Quantity: 1   
Refills: 3 Ordered: 2022   
Antonieta Baker DO Start : 2022   
End : 3-Aug-2022 Complete  
   
                                        Start: 2022   take 1 tablet by juliano  
 once   
daily                                   Levonorgest-Eth Estrad 91-Day 0.1-0.02   
& 0.01 MG Oral Tablet TAKE 1 TABLET   
DAILY UNTIL FINISHED. Quantity: 1   
Refills: 3 Ordered: 2022   
Antonieta Baker DO Start : 2022   
Active  
  
  
  
                                                    nystatin [id number]   
unt/ml /   
triamcinolone   
acetonide 1 mg/ml   
topical cream  
(6 sources)                             Polyene   
Antifungal,   
Corticosteroid                          Start:   
05-                                          Nystatin-Triamcinolone   
030097-8.1 UNIT/GM-%   
External Cream APPLY   
SPARINGLY TO AFFECTED   
AREA(S) 3 TIMES A DAY   
Quantity: 1 Refills: 2   
Ordered: 15-May-2023   
Antonieta Baker MD Start :   
15-May-2023 Active  
   
                                                    topiramate 25 mg   
oral tablet  
(1 source)                                          Start:   
2021                              take 1   
tablet by   
mouth   
twice   
daily                                   Topiramate 25 MG Oral   
Tablet TAKE 1 TABLET TWICE   
DAILY. Quantity: 60   
Refills: 5 Ordered:   
2021 Juyl Huang Start :   
2021 Active  
   
                                                    triamcinolone   
acetonide 1 mg/ml   
topical cream  
(1 source)                Corticosteroid            Start:   
2020                                          Triamcinolone Acetonide   
0.1 % External Cream APPLY   
A THIN LAYER TO AFFECTED   
AREA(S) TWICE DAILY.   
Quantity: 1 Refills: 0   
Ordered: 5-May-2020   
Liz   
BRIANMichaelaRUBENCarmen Start :   
5-May-2020 Active  
  
  
  
Problems  
Active Problems  
  
  
                                                    Problem   
Classification  Problem         Date            Documented Date Episodic/Chronic  
   
                                                    Anxiety disorders  
(9 sources)                             Anxiety;   
Translations:   
[Anxiety disorder,   
unspecified]                            Onset:   
2023                Chronic  
   
                                                    Disorders of lipid   
metabolism  
(20 sources)                            Mixed hyperlipidemia;   
Translations: [Mixed   
hyperlipidemia]                         Onset:   
2023                Chronic  
   
                                                    Headache; including   
migraine  
(20 sources)                            Migraine;   
Translations:   
[Migraine,   
unspecified, without   
mention of   
intractable migraine   
without mention of   
status migrainosus]                     Onset:   
2023                Chronic  
   
                                                    Immunizations and   
screening for   
infectious disease  
(20 sources)                            Patient encounter   
status; Translations:   
[Other specified   
vaccination]                            2023          Episodic  
   
                                                    Inflammatory diseases   
of female pelvic   
organs  
(6 sources)                             Vulvitis;   
Translations:   
[Vaginitis and   
vulvovaginitis,   
unspecified]                                                Episodic  
   
                                                    Menopausal disorders  
(18 sources)                            Postmenopausal   
bleeding;   
Translations:   
[Postmenopausal   
bleeding]                               Onset:   
2023                Chronic  
   
                                                    Mood disorders  
(20 sources)                            Depressive disorder;   
Translations:   
[Depressive disorder,   
not elsewhere   
classified]                             Onset:   
2023                Chronic  
   
                                                    Other connective   
tissue disease  
(20 sources)                            H/O: musculoskeletal   
disease;   
Translations:   
[Personal history of   
other musculoskeletal   
disorders]                                                  Episodic  
   
                                                    Other connective   
tissue disease  
(2 sources)                             Pain in right foot;   
Translations: [Pain   
in right foot]                          Onset:   
2024                                          Episodic  
   
                                                    Other connective   
tissue disease  
(2 sources)               Foot pain                 Onset:   
2024                                          Episodic  
   
                                                    Other female genital   
disorders  
(4 sources)                             Abnormal uterine   
bleeding;   
Translations:   
[Abnormal uterine and   
vaginal bleeding,   
unspecified]                            10-          Chronic  
   
                                                    Other female genital   
disorders  
(1 source)                              Abnormal uterine and   
vaginal bleeding,   
unspecified;   
Translations:   
[Abnormal uterine   
bleeding]                               Onset:   
2025                                          Chronic  
   
                                                    Other female genital   
disorders  
(6 sources)                             Pruritus of vagina;   
Translations:   
[Pruritus of genital   
organs]                                                     Episodic  
   
                                                    Other nervous system   
disorders  
(1 source)                              Other chronic pain;   
Translations: [Other   
chronic pain]                           Onset:   
10-                                          Chronic  
   
                                                    Other nutritional;   
endocrine; and   
metabolic disorders  
(1 source)                              Obesity;   
Translations:   
[Obesity,   
unspecified]                                                Chronic  
   
                                                    Other nutritional;   
endocrine; and   
metabolic disorders  
(4 sources)                             Morbid obesity;   
Translations: [Morbid   
obesity]                                                    Chronic  
   
                                                    Other nutritional;   
endocrine; and   
metabolic disorders  
(4 sources)                             Body mass index 40+ -   
severely obese;   
Translations: [Body   
Mass Index 40.0-44.9,   
adult]                                                      Chronic  
   
                                                    Other nutritional;   
endocrine; and   
metabolic disorders  
(16 sources)                            Body mass index 30+ -   
obesity;   
Translations: [Body   
Mass Index 39.0-39.9,   
adult]                                                      Chronic  
   
                                                    Other nutritional;   
endocrine; and   
metabolic disorders  
(20 sources)                            Severe obesity;   
Translations: [Morbid   
obesity]                                Onset:   
2023                Chronic  
   
                                                    Other nutritional;   
endocrine; and   
metabolic disorders  
(2 sources)                             Morbid (severe)   
obesity due to excess   
calories;   
Translations: [Morbid   
(severe) obesity due   
to excess calories   
(Multi)]                                Onset:   
2023                                          Chronic  
   
                                                    Other nutritional;   
endocrine; and   
metabolic disorders  
(2 sources)                             Body mass index (BMI)   
40.0-44.9, adult;   
Translations: [Body   
mass index (BMI)   
40.0-44.9, adult   
(Multi)]                                Onset:   
2023                                          Chronic  
   
                                                    Other pregnancy and   
delivery including   
normal  
(17 sources)                            Delivery normal;   
Translations: [Normal   
delivery]                                                   Episodic  
   
                                        Comment on above:   2008 40 weeks,  
 female, 7lbs2007 40 weeks, female 7  
  
lbs;   
   
                                                    Other screening for   
suspected conditions   
(not mental disorders   
or infectious   
disease)  
(2 sources)                             Endometrium   
thickened;   
Translations:   
[Abnormal findings on   
diagnostic imaging of   
other specified body   
structures]                             10-          Chronic  
   
                                                    Other screening for   
suspected conditions   
(not mental disorders   
or infectious   
disease)  
(20 sources)                            Cancer cervix   
screening status;   
Translations:   
[Screening for   
malignant neoplasms   
of cervix]                              Onset:   
2023                                          Episodic  
   
                                                    Ovarian cyst  
(1 source)                              Other ovarian cyst,   
left side;   
Translations: [Other   
ovarian cyst, left   
side]                                   Onset:   
2023                                          Episodic  
   
                                                    Residual codes;   
unclassified  
(20 sources)                            Hypoxia;   
Translations:   
[Idiopathic sleep   
related   
non-obstructive   
alveolar   
hypoventilation]                        Onset:   
2023                Chronic  
   
                                                    Residual codes;   
unclassified  
(17 sources)                            Past history of   
procedure;   
Translations: [Other   
specified personal   
history presenting   
hazards to health]                                          Episodic  
   
                                        Comment on above:   ;   
   
                                                    Residual codes;   
unclassified  
(12 sources)                            Medication regimen   
behavior finding;   
Translations: [Other   
unknown and   
unspecified cause of   
morbidity and   
mortality]                                                  Episodic  
   
                                                    Residual codes;   
unclassified  
(4 sources)                             Illness, unspecified;   
Translations:   
[Illness,   
unspecified]                            Onset:   
2023                                          Episodic  
   
                                                    Spondylosis;   
intervertebral disc   
disorders; other back   
problems  
(1 source)                              Spondylosis without   
myelopathy or   
radiculopathy,   
cervical region;   
Translations:   
[Spondylosis w/o   
myelopathy or   
radiculopathy,   
cervical region]                        Onset:   
10-                                          Chronic  
  
  
Past or Other Problems  
  
  
                      Problem Classification Problem    Date       Documented Da  
te Episodic/Chronic  
   
                                                    Acute bronchitis  
(9 sources)                             Acute bronchitis;   
Translations:   
[Acute bronchitis,   
unspecified]                            Onset:   
2024  
Resolved:   
2024                Episodic  
   
                                                    Allergic reactions  
(20 sources)                            Eczema;   
Translations:   
[Contact dermatitis   
and other eczema,   
unspecified cause]                      Onset:   
2023  
Resolved:   
2024                Episodic  
   
                                                    Benign neoplasm of   
uterus  
(3 sources)                             Intramural   
leiomyoma of   
uterus;   
Translations:   
[Intramural   
leiomyoma of   
uterus]                                 Onset:   
10-                09-                Episodic  
   
                                                    Diabetes mellitus   
without complication  
(20 sources)                            Hyperglycemia;   
Translations:   
[Other abnormal   
glucose]                                Onset:   
2023                Episodic  
   
                                                    Malaise and fatigue  
(20 sources)                            Fatigue;   
Translations:   
[Other malaise and   
fatigue]                                Onset:   
2023  
Resolved:   
2023                Episodic  
   
                                                    Menstrual disorders  
(20 sources)                            Irregular periods;   
Translations:   
[Irregular   
menstrual cycle]                        Onset:   
2023  
Resolved:   
2024                Chronic  
   
                                                    Osteoarthritis  
(15 sources)                            Osteoarthritis;   
Translations:   
[Osteoarthrosis,   
unspecified whether   
generalized or   
localized, site   
unspecified]                            Onset:   
2023  
Resolved:   
2023                Chronic  
   
                                                    Other and unspecified   
benign neoplasm  
(20 sources)                            History of polyp of   
colon;   
Translations:   
[Personal history   
of colonic polyps]                      Onset:   
2023                Episodic  
   
                                                    Other liver diseases  
(20 sources)                            Alkaline   
phosphatase raised;   
Translations:   
[Other nonspecific   
abnormal serum   
enzyme levels]                          Onset:   
2023  
Resolved:   
2023                Episodic  
   
                                                    Other lower   
respiratory disease  
(20 sources)                            Snoring;   
Translations:   
[Other respiratory   
abnormalities]                          Onset:   
2023                Episodic  
   
                                                    Residual codes;   
unclassified  
(20 sources)                            Menopause present;   
Translations:   
[Symptomatic   
menopausal or   
female climacteric   
states]                                 Onset:   
2023  
Resolved:   
2023                Episodic  
   
                                                    Residual codes;   
unclassified  
(7 sources)                             Taking medication   
for chronic   
disease;   
Translations:   
[Illness,   
unspecified]                            Onset:   
2023                Episodic  
   
                                                    Spondylosis;   
intervertebral disc   
disorders; other back   
problems  
(19 sources)                            Chronic neck pain;   
Translations:   
[Cervicalgia]                           Onset:   
10-  
Resolved:   
2023                Episodic  
   
                                                    Unclassified  
(17 sources)                            Finding of   
menstrual bleeding;   
Translations:   
[Menstruation]                                                
   
                                        Comment on above:   age 12;   
   
                                                    Unclassified  
(6 sources)                                         Onset:   
2023  
Resolved:   
2024                  
  
  
  
Results  
  
  
                          Test Name    Value        Interpretation Reference   
[facility]                                   Facility  
   
                                                    [facility] 01-   
   
                                        CNPN                Telephone (OBGYWM)  
-----------------------  
-----------  
CASSI DE LA TORRE   
([id number]) 1970 F  
Date Time Provider   
Department  
1/10/25 DEVON RUIZ OBGYWM  
During your visit   
today, we recorded the   
following information   
about you:  
Jyotsna Cortes, RN   
1/10/2025 9:12 AM   
Signed  
Patient calling   
regarding her bleeding.   
She took last pill of   
norethindrone two  
days ago and bleeding   
started again this   
morning. It is heavy   
like it was.  
Changing pad every 1   
hour so far this   
morning again. Asking   
if she should  
restart norethindrone?   
Should she continue it   
until her scheduled   
surgery? Okay  
to leave detailed   
message when calling   
her back.  
HITESH Billingsley Rebecca L, MD   
1/10/2025 3:11 PM   
Signed  
Ok to continue until   
day before her surgery.   
May need to take BID or   
TID for a  
couple of days to slow   
bleeding. MD Hitesh Lira Trisha, RN   
1/10/2025 3:16 PM   
Signed  
Patient notified.  
Jyotsna Cortes RN  
Allergies As of Date:   
01/10/2025 Noted   
Allergy Reaction  
NUT - UNSPECIFIED   
2023 10 -   
Anaphylaxis  
Date Reviewed:   
2025  
Reviewed by: Devon Ruiz MD - Fully   
Assessed  
Reason for Visit:  
Heavy Bleeding [Other]  
Visit   
Diagnosis:Abnormal   
uterine bleeding   
[N93.9]  
Order(s):norethindrone   
(AYGESTIN) 5 mg tablet1   
tab 3x/day until   
bleeding  
stops. 1 tab 2x/day x 2   
days. 1 tab daily x 5   
daysDisp: 35  
tabletRfl: 0  
Prescriptions as of   
01/10/2025  
- norethindrone   
(AYGESTIN) 5 mg tablet  
1 tab 3x/day until   
bleeding stops. 1 tab   
2x/day x 2 days. 1 tab   
daily x 5 days  
- SUMAtriptan (IMITREX)   
50 mg tablet  
- MAGNESIUM ORAL  
Take by mouth.  
- atorvastatin   
(LIPITOR) 20 mg tablet  
Take 20 mg by mouth.  
- ARIPiprazole   
(ABILIFY) 2 mg tablet  
Take 2 mg by mouth.  
- albuterol HFA   
(PROVENTIL HFA,   
VENTOLIN HFA) 90   
mcg/actuation inhaler  
Inhale 2 puffs every 4   
hours by inhalation   
route as needed.  
- sertraline (ZOLOFT)   
100 mg tablet  
Take 200 mg by mouth.  
Problem List As Of Date   
01/10/2025 Noted   
Resolved  
Class 3 severe obesity   
due to excess calories   
w*2023  
Depression, major,   
single episode, mild   
(HCC) [*2023  
History of colonic   
polyps [Z86.0100]   
2023  
Migraine [G43.909]   
2023  
Mixed hyperlipidemia   
[E78.2] 2023  
Nocturnal hypoxemia   
[G47.34] 2023  
Prescriptions ordered   
this encounter Disp   
Refills Start End  
NORETHINDRONE ACETATE 5   
MG TABLET 35 t* 0   
01/10/2025  
Si tab 3x/day until   
bleeding stops. 1 tab   
2x/day x 2 days. 1 tab   
daily x  
5 days  
Medications   
Discontinued During   
This Encounter  
Prescriptions  
- norethindrone   
(AYGESTIN) 5 mg tablet   
(Discontinued)  
1 tab 3x/day until   
bleeding stops. 1 tab   
2x/day x 2 days. 1 tab   
daily x 5 days  
Encounter Number:   
[id number]  
Encounter Status:Closed   
by JYOTSNA CORTES on   
1/10/25             Normal                                  [facility]  
   
                                                    Basic metabolic 2000 panelOr  
dered By: Mandy Johnson on 2025   
   
                                                    Anion gap   
[Moles/Vol]     8 mmol/L                        8 - 15 mmol/L   [facility]  
   
                          Calcium [Mass/Vol] 9.6 mg/dL                 8.5 - 10.  
2   
mg/dL                                   [facility]  
   
                                                    Chloride   
[Moles/Vol]         104 mmol/L                              98 - 107   
mmol/L                                  [facility]  
   
                      CO2 [Moles/Vol] 25 mmol/L             22 - 30 mmol/L [facility]  
   
                                                    Creatinine   
[Mass/Vol]          0.81 mg/dL                              0.58 - 0.96   
mg/dL                                   [facility]  
   
                                                    GFR/1.73 sq   
M.predicted among   
non-blacks MDRD   
(S/P/Bld) [Vol   
rate/Area]      85 mL/min/{1.73_m2}                 - PINF          [facility]  
   
                                        Comment on above:   Estimated Glomerular  
 Filtration Rate (eGFR) is calculated   
using the   
 CKD-EPI creatinine equation. This equation utilizes serum   
creatinine, sex, and age as parameters. The creatinine assay has   
traceable calibration to isotope dilution-mass spectrometry. Refer   
to KDIGO guidelines for clinical interpretation. In patients with   
unstable renal function, e.g. those with acute kidney injury, the   
eGFR may not accurately reflect actual GFR.   
   
                      Glucose [Mass/Vol] 131 mg/dL  High       74 - 99 mg/dL [facility]  
   
                                        Comment on above:   The American Diabete  
s Association (ADA) provides guidance for   
cutoff values for fasting glucose and random glucose. The ADA   
defines fasting as no caloric intake for at least 8 hours. Fasting   
plasma glucose results between 100 to 125 mg/dL indicate increased   
risk for diabetes (prediabetes).  
Fasting plasma glucose results greater than or equal to 126 mg/dL   
meet the criteria for diagnosis of diabetes. In the absence of   
unequivocal hyperglycemia, results should be confirmed by repeat   
testing. In a patient with classic symptoms of hyperglycemia or   
hyperglycemic crisis, random plasma glucose results greater than or   
equal to 200 mg/dL meet the criteria for diagnosis of diabetes.  
Reference: Standards of Medical Care in Diabetes 2016, American   
Diabetes Association. Diabetes Care. 2016.39(Suppl 1).  
  
   
   
                                                    Interpretation and   
review of   
laboratory results Abnormal                                        [facility]  
   
                                                    Potassium   
[Moles/Vol]         4.2 mmol/L                              3.7 - 5.1   
mmol/L                                  [facility]  
   
                          Sodium [Moles/Vol] 137 mmol/L                136 - 144  
   
mmol/L                                  [facility]  
   
                                                    Urea nitrogen   
[Mass/Vol]      11 mg/dL                        7 - 21 mg/dL    [facility]  
   
                                                    Basic metabolic 2000 panelon  
 2025   
   
                                                    Anion gap   
[Moles/Vol]     8 mmol/L        Normal          8-15            [facility]  
   
                                        Comment on above:   Order Comment: Speci  
men Type: BLOOD SPECIMENOrdering Facility:  
   
[facility] Address: [address] [place] [address]   
   
                                                            Performed By: #### 2  
4321-2 ####[facility] Hospital[facility]CSON[facility] [id number] [place] [address]   
[place]   
   
                      Calcium [Mass/Vol] 9.6 mg/dL  Normal     8.5-10.2   [facility]  
   
                                        Comment on above:   Order Comment: Speci  
men Type: BLOOD SPECIMENOrdering Facility:  
   
[facility] Address: [address] [place] [address]   
   
                                                            Performed By: #### 2  
4321-2 ####[facility] Hospital[facility]CL[facility] [id number] [place] [address]   
[place]   
   
                                                    Chloride   
[Moles/Vol]     104 mmol/L      Normal                    [facility]  
   
                                        Comment on above:   Order Comment: Speci  
men Type: BLOOD SPECIMENOrdering Facility:  
   
[facility] Address: [address] [place] [address]   
   
                                                            Performed By: #### 2  
4321-2 ####[facility] Hospital[facility]CL[facility] [id number] [place] [address]   
[place]   
   
                      CO2 [Moles/Vol] 25 mmol/L  Normal     22-30      [facility]  
   
                                        Comment on above:   Order Comment: Speci  
men Type: BLOOD SPECIMENOrdering Facility:  
   
[facility] Address: [address] [place] [address]   
   
                                                            Performed By: #### 2  
4321-2 ####[facility]   
PERFECTOPisecoNCSONYA [id number] [place] [address]   
[place]   
   
                                                    Creatinine   
[Mass/Vol]      0.81 mg/dL      Normal          0.58-0.96       [facility]  
   
                                        Comment on above:   Order Comment: Speci  
men Type: BLOOD SPECIMENOrdering Facility:  
   
[facility] Address: [address] [place] [address]   
   
                                                            Performed By: #### 2  
4321-2 ####[facility] [id number] [place] [address]   
[place]   
   
                                                    Creatinine and   
Glomerular   
filtration   
rate.predicted   
panel (S/P/Bld) 85 mL/min/1.73m??? Normal          >=60            [facility]  
   
                                        Comment on above:   Order Comment: Speci  
men Type: BLOOD SPECIMENOrdering Facility:  
   
[facility] Address: [address] [place] [address]   
   
                                                            Result Comment: Luisa  
mated Glomerular Filtration Rate (eGFR) is   
calculated using the  CKD-EPI creatinine equation. This   
equation utilizes serum creatinine, sex, and age as parameters. The   
creatinine assay has traceable calibration to isotope dilution-mass   
spectrometry. Refer to KDIGO guidelines for clinical   
interpretation. In patients with unstable renal function, e.g.   
those with acute kidney injury, the eGFR may not accurately reflect   
actual GFR.   
   
                                                            Performed By: #### 2  
4321-2 ####[facility] [id number] [place] [address]   
[place]   
   
                      Glucose [Mass/Vol] 131 mg/dL  High       74-99      [facility]  
   
                                        Comment on above:   Order Comment: Speci  
men Type: BLOOD SPECIMENOrdering Facility:  
   
[facility] Address: [address] [place] [address]   
   
                                                            Result Comment: The   
American Diabetes Association (ADA) provides   
guidance for cutoff values for fasting glucose and random glucose.   
The ADA defines fasting as no caloric intake for at least 8 hours.   
Fasting plasma glucose results between 100 to 125 mg/dL indicate   
increased risk for diabetes (prediabetes).  
Fasting plasma glucose results greater than or equal to 126 mg/dL   
meet the criteria for diagnosis of diabetes. In the absence of   
unequivocal hyperglycemia, results should be confirmed by repeat   
testing. In a patient with classic symptoms of hyperglycemia or   
hyperglycemic crisis, random plasma glucose results greater than or   
equal to 200 mg/dL meet the criteria for diagnosis of diabetes.  
Reference: Standards of Medical Care in Diabetes 2016, American   
Diabetes Association. Diabetes Care. 2016.39(Suppl 1).   
   
                                                            Performed By: #### 2  
4321-2 ####[facility]   
MILLWNCLIA [id number] [place] [address]   
[place]   
   
                                                    Potassium   
[Moles/Vol]     4.2 mmol/L      Normal          3.7-5.1         [facility]  
   
                                        Comment on above:   Order Comment: Speci  
men Type: BLOOD SPECIMENOrdering Facility:  
   
[facility] Address: [address] [place] [address]   
   
                                                            Performed By: #### 2  
4321-2 ####[facility] [id number] [place] [address]   
[place]   
   
                      Sodium [Moles/Vol] 137 mmol/L Normal     136-144    [facility]  
   
                                        Comment on above:   Order Comment: Speci  
men Type: BLOOD SPECIMENOrdering Facility:  
   
[facility] Address: [address] [place] [address]   
   
                                                            Performed By: #### 2  
4321-2 ####[facility] [id number] [place] [address]   
[place]   
   
                                                    Urea nitrogen   
[Mass/Vol]      11 mg/dL        Normal          7-21            [facility]  
   
                                        Comment on above:   Order Comment: Speci  
men Type: BLOOD SPECIMENOrdering Facility:  
   
[facility] Address: [address] [place] [address]   
   
                                                            Performed By: #### 2  
4321-2 ####[facility] [id number] [place] [address]   
[place]   
   
                                                    CBC panel Auto (Bld)on    
   
                                                    Erythrocyte   
distribution width   
(RBC) [Ratio]   13.3 %                          11.5 - 15.0 %   [facility]  
   
                                                    Hematocrit (Bld)   
[Volume fraction] 41.0 %                          36.0 - 46.0 %   [facility]  
   
                                                    Hemoglobin (Bld)   
[Mass/Vol]          13.6 g/dL                               11.5 - 15.5   
g/dL                                    [facility]  
   
                                                    Interpretation and   
review of   
laboratory results Normal                                          [facility]  
   
                                                    MCH (RBC) [Entitic   
mass]           27.5 pg                         26.0 - 34.0 pg  [facility]  
   
                                                    MCHC (RBC)   
[Mass/Vol]          33.2 g/dL                               30.5 - 36.0   
g/dL                                    [facility]  
   
                                                    MCV (RBC) [Entitic   
vol]                83.0 fL                                 80.0 - 100.0   
fL                                      [facility]  
   
                                                    Nucleated RBC   
(Bld) [#/Vol]                                   NINF            [facility]  
   
                                                    Platelet mean   
volume (Bld)   
[Entitic vol]   9.0 fL                          9.0 - 12.7 fL   [facility]  
   
                                                    Platelets (Bld)   
[#/Vol]         324 10*3/uL                                     [facility]  
   
                          RBC (Bld) [#/Vol] 4.94 10*6/uL              3.90 - 5.2  
0   
m/uL                                    [facility]  
   
                      WBC (Bld) [#/Vol] 7.53 10*3/uL                       [facility]  
   
                                                    Erythrocyte   
distribution width   
(RBC) [Ratio]   13.3 %          Normal          11.5-15.0       [facility]  
   
                                        Comment on above:   Order Comment: Speci  
men Type: BLOOD SPECIMENOrdering Facility:  
   
[facility] Address: [address] [place] [address]   
[address]   
   
                                                            Performed By: #### 5  
8410-2 ####[facility] [id number] [address] OF [facility]   
   
                                                    Hematocrit (Bld)   
[Volume fraction] 41.0 %          Normal          36.0-46.0       [facility]  
   
                                        Comment on above:   Order Comment: Speci  
men Type: BLOOD SPECIMENOrdering Facility:  
  
[facility] Address: [address] [place] [address]   
[address]   
   
                                                            Performed By: #### 5  
8410-2 ####[facility] [id number] [place] [address]   
[place]   
   
                                                    Hemoglobin (Bld)   
[Mass/Vol]      13.6 g/dL       Normal          11.5-15.5       [facility]  
   
                                        Comment on above:   Order Comment: Speci  
men Type: BLOOD SPECIMENOrdering Facility:  
   
[facility] Address: [address] [place] [address]   
   
                                                            Performed By: #### 5  
8410-2 ####[facility] [id number] [place] [address]   
[place]   
   
                                                    MCH (RBC) [Entitic   
mass]           27.5 pg         Normal          26.0-34.0       [facility]  
   
                                        Comment on above:   Order Comment: Speci  
men Type: BLOOD SPECIMENOrdering Facility:  
  
[facility] Address: [address] [place] [address]   
   
                                                            Performed By: #### 5  
8410-2 ####[facility] [id number] [place] [address]   
[place]   
   
                                                    MCHC (RBC)   
[Mass/Vol]      33.2 g/dL       Normal          30.5-36.0       [facility]  
   
                                        Comment on above:   Order Comment: Speci  
men Type: BLOOD SPECIMENOrdering Facility:  
   
[facility] Address: [address] [place] [address]   
   
                                                            Performed By: #### 5  
8410-2 ####[facility] [id number] [address] [place]   
   
                                                    MCV (RBC) [Entitic   
vol]            83.0 fL         Normal          80.0-100.0      [facility]  
   
                                        Comment on above:   Order Comment: Speci  
men Type: BLOOD SPECIMENOrdering Facility:  
  
[facility] Address: [address] [place] [address]   
   
                                                            Performed By: #### 5  
8410-2 ####[facility] HospitalNCS[facility] [id number] [place] [address]   
[place]   
   
                                                    Nucleated RBC   
(Bld) [#/Vol]   10*3/uL         Normal          <0.01           [facility]  
   
                                        Comment on above:   Order Comment: Speci  
men Type: BLOOD SPECIMENOrdering Facility:  
   
[facility] Address: [address] [place] [address]   
   
                                                            Performed By: #### 5  
8410-2 ####[facility] HospitalN[facility] [id number] [place] [address]   
[place]   
   
                                                    Platelet mean   
volume (Bld)   
[Entitic vol]   9.0 fL          Normal          9.0-12.7        [facility]  
   
                                        Comment on above:   Order Comment: Speci  
men Type: BLOOD SPECIMENOrdering Facility:  
  
[facility] Address: [address] [place] [address]   
   
                                                            Performed By: #### 5  
8410-2 ####[facility] [id number] [place] [address]   
[place]   
   
                                                    Platelets (Bld)   
[#/Vol]         324 10*3/uL     Normal          150-400         [facility]  
   
                                        Comment on above:   Order Comment: Speci  
men Type: BLOOD SPECIMENOrdering Facility:  
   
[facility] Address: [address] [place] [address]   
   
                                                            Performed By: #### 5  
8410-2 ####[facility] [id number] [place] [address]   
[place]   
   
                      RBC (Bld) [#/Vol] 4.94 10*6/uL Normal     3.90-5.20  [facility]  
   
                                        Comment on above:   Order Comment: Speci  
men Type: BLOOD SPECIMENOrdering Facility:  
   
[facility] Address: [address] [place] [address]   
   
                                                            Performed By: #### 5  
8410-2 ####[facility] HospitalNCLIA [id number] [place] [address]   
[place]   
   
                      WBC (Bld) [#/Vol] 7.53 10*3/uL Normal     3.70-11.00 [facility]  
   
                                        Comment on above:   Order Comment: Speci  
men Type: BLOOD SPECIMENOrdering Facility:  
   
[facility] Address: [address] [place]   
[address]   
   
                                                            Performed By: #### 5  
8410-2 ####[facility] REZA   
CATRACHITOWNCLISONYA [id number] [address] OF [facility]   
   
                                                    CNOVon 2025   
   
                                        CNOV                Office Visit (OBGYWM  
)  
-----------------------  
-----------  
LANDYCASSI AZUL   
([id number]) 1970 F  
Date Time Provider   
Department  
25 9:00 AM DEVON RUIZ OBGYWM  
During your visit   
today, we recorded the   
following information   
about you:  
Blood pressure Weight   
Last Period  
142/86 111.1 kg   
25  
Devon Ruiz MD   
2025 3:46 PM Signed  
Cassi De La Torre is a   
54 year old female who   
presents for problem   
visit for  
f/u AUB.  
HPI: 54 YOF s/p   
hysteroscopy [facility] in   
Oct. 2024 presents c/o   
AUB> Woke up 25  
w/ heavy menses, seen   
here 1/2. Started on   
Aygestin and bleeding   
trailed off  
within 3 days. Likely   
postmenopausal based on   
FSH and LH drawn in the   
past.  
However she denies   
having any vaginal   
dryness, hot flashes or   
night sweats.  
Had 3 episodes of   
bleeding in the past   
year.  
OB History  
 T2  L2  
SAB0 IAB0 Ectopic0   
Multiple0 Live Births0  
Gyn History  
LMP: 09/15/2024   
(Approximate), Having   
periods  
Age at Menarche:  
Age at First Pregnancy:  
Age at Menopause:  
Gyn History Comments:  
Sexual Activity: Yes;   
Male  
Contraception:   
Vasectomy  
PAST MEDICAL HISTORY  
Diagnosis Date  
Depression  
High cholesterol  
PAST SURGICAL HISTORY  
Procedure Laterality   
Date  
APPENDECTOMY   
COLONOSCOPY SCREENING   
2019  
DANDC, DIAG AND/OR   
THERAPEUTIC 10/24/2024  
hysteroscopy  
EXTRACTION ERUPTED   
TOOTH/EXR  
unknown date  
LITHOTRIPSY XTRCORP   
SHOCK WAVE 2009  
Renal Lithotripsy  
FAMILY HISTORY  
Problem Relation Age of   
Onset  
Depression Mother  
Diabetes Mother  
other   
(hypercholesterolemia   
[other]) Mother  
other (htn) Mother  
Thyroid Mother  
Thyroid disease  
Alcohol abuse Father  
other (cva) Father  
Ischemic Heart Disease   
Father  
Diabetes Father  
other   
(hypercholesterolemia   
[other]) Father  
other (TIA) Father  
Parkinson's Father  
other   
(hypercholesterolemia   
[other]) Brother  
Brain Cancer Maternal   
Grandmother  
other (cardiac   
disorder) Maternal   
Grandfather  
other (htn) Maternal   
Grandfather  
other (CVA) Maternal   
Grandfather  
other (Cardiac   
disorder) Paternal   
Grandfather  
other (htn) Paternal   
Grandfather  
Diabetes Paternal   
Grandfather  
Social History  
Tobacco Use  
Smoking status: Never  
Smokeless tobacco:   
Never  
Vaping Use  
Vaping status: Never   
Used  
Substance Use Topics  
Alcohol use: Yes  
Comment: rare  
Drug use: Not Currently  
Current Outpatient   
Medications  
Medication Sig  
norethindrone   
(AYGESTIN) 5 mg tablet   
1 tab 3x/day until   
bleeding stops. 1 tab  
2x/day x 2 days. 1 tab   
daily x 5 days  
atorvastatin (LIPITOR)   
20 mg tablet Take 20 mg   
by mouth.  
ARIPiprazole (ABILIFY)   
2 mg tablet Take 2 mg   
by mouth.  
albuterol HFA   
(PROVENTIL HFA,   
VENTOLIN HFA) 90   
mcg/actuation inhaler   
Inhale 2  
puffs every 4 hours by   
inhalation route as   
needed.  
sertraline (ZOLOFT) 100   
mg tablet Take 200 mg   
by mouth.  
No current   
facility-administered   
medications for this   
visit.  
Allergies As of Date:   
2025  
Allergen Noted Reaction  
NUT - UNSPECIFIED   
2023 Anaphylaxis  
Fully Assessed   
2025  
Allergies and current   
medication updated:Yes  
SENSITIVE EXAM: The   
sensitive examination   
was discussed with the   
Patient or  
Patient's Authorized   
Representative. As   
applicable, any other   
physician,  
advance practice   
provider, medical   
student, or other   
health professional  
student that will be   
observing or involved   
in the sensitive   
examination for  
educational or training   
purposes was discussed   
with the Patient or   
Authorized  
Representative. The   
Patient or Authorized   
Representative has   
agreed to proceed  
with the sensitive   
examination. (Sensitive   
examination includes   
inspection  
and/or palpation of the   
breasts, pelvis,   
prostate and anorectal   
regions).  
EXAM: LMP 09/15/2024  
GENERAL: pleasant,   
 female in no   
apparent distress  
PELVIC: external   
genitalia normal,   
normal Bartholin's   
glands, urethra,   
Faxon's  
glands, no vulvar   
lesions, no cervical   
lesions, good vaginal   
support,  
physiologic discharge   
present, normal   
appearing perineal body   
and perianal  
region  
BIMANUAL: uterus normal   
size, shape and   
consistency, no adnexal   
masses, and  
non-tender  
ASSESSMENT AND PLAN:  
Assessment AND Plan  
Abnormal uterine   
bleeding  
Orders:  
COMPLETE BLOOD COUNT;   
Future  
BASIC METABOLIC PANEL;   
Future  
ESTROGEN FRACTION BL;   
Future  
FOLLICLE STIMULATING   
HORMONE; Future  
LUTEINIZING HORMONE;   
Future  
Check labs, reviewed   
pathology from recent   
hysteroscopy [facility],   
reviewed pelvic  
ultrasound from . I   
discussed with the   
patient that the   
continued bleeding  
has concerning. Could   
consider an IUD,   
however this may be   
postmenopausal  
bleeding and though no   
evidence of underlying   
malignancy has been   
identified at  
this point, there is a   
small chance of   
underlying malignancy   
that is difficult  
to diagnose. Ovaries   
appear normal. I   
discussed with her the   
option of  
continued hormonal   
suppression for the   
next 6 to 12 months,   
Mirena IUD  
insertion, or   
hysterectomy. At this   
point patient desires   
definitive (more   
content not   
included)...        Normal                                  [facility]  
   
                                                    ESTROGEN FRACTION BLon    
   
                      ESTRADIOL  9.0 pg/mL  Normal                [facility]  
   
                                        Comment on above:   Order Comment: Speci  
men Type: BLOOD SPECIMENOrdering Facility:  
   
[facility] Address: [address] [place] [address]   
   
                                                            Result Comment: REFE  
RENCE INTERVAL: Estradiol by Mass Spec  
For a complete set of all established reference intervals, refer  
to [employer]/Tests/Pub/7028409.  
This test was developed and its performance characteristics  
determined by [facility]. It has not been cleared or  
approved by the [place] Food and Drug Administration. This test was  
performed in a CLIA certified laboratory and is intended for  
clinical purposes.   
   
                                                            Performed By: #### E  
STGHONG ####[facility] LABORATORIESIA [id number]   
[place] [address]   
   
                      ESTROGENS TOTAL 24.7 pg/mL Normal                [facility]  
   
                                        Comment on above:   Order Comment: Speci  
men Type: BLOOD SPECIMENOrdering Facility:  
   
[facility] Address: [address] [place], [place] [address]   
   
                                                            Result Comment: Refe  
rence interval of estrogens  
(pg/mL) Estrone Estradiol Total Estrogens  
Early follicular <150.0 30.0-100.0 30.0-250.0  
Late follicular 100.0-250.0 100.0-400.0 200.0-650.0  
Luteal <200.0 50.0-150.0 50.0-350.0  
Post-menopausal 3.0-32.0 2.0-21.0 5.0-52.0  
REFERENCE INTERVAL: Estrogens Total Calculation  
For a complete set of all established reference intervals, refer  
to [employer]/Tests/Pub/[id number].  
Performed By: [facility]  
[address] [place] [address]  
: Jaron Whitley MD, PhD  
CLIA Number: [id number]   
   
                                                            Performed By: #### E  
GLENDY ####[facility] LABORATORIESIA [id number]   
[place] [id number]   
   
                      ESTRONE    15.7 pg/mL Normal                [facility]  
   
                                        Comment on above:   Order Comment: Speci  
men Type: BLOOD SPECIMENOrdering Facility:  
   
[facility] Address: [address] [place] [address]   
   
                                                            Result Comment:  
INTERPRETIVE INFORMATION: Estrone by Mass Spec  
For a complete set of all established reference intervals, refer  
to [employer]/Tests/Pub/[id number].  
This test was developed and its performance characteristics  
determined by [facility]. It has not been cleared or  
approved by the [place] Food and Drug Administration. This test was  
performed in a CLIA certified laboratory and is intended for  
clinical purposes.   
   
                                                            Performed By: #### E  
STGEN ####[facility] Center[facility] [id number]   
[place] [id number]   
   
                                                    FSH SerPl-aCncon 2025   
   
                      Follitropin Qn 8.6 m[IU]/mL Normal     See comment [facility]  
   
                                        Comment on above:   Order Comment: Speci  
men Type: BLOOD SPECIMENOrdering Facility:  
   
[facility] Address: [address] [place] [address]   
   
                                                            Result Comment: Refe  
rence range:  
Follicular: 3.5-12.5 mIU/mL  
Ovulation: 4.7-21.5 mIU/mL  
Luteal: 1.7-7.7 mIU/mL  
Postmenopausal: 25.8-134.8 mIU/mL   
   
                                                            Performed By: #### 1  
5067-2, 12284-6 ####[facility] LABCLIA [id number] [place] [address] [place]   
   
                                                    HISTORY PHYSICALon    
   
                                        HISTORY PHYSICAL    HNO ID: [id number]  
Author: DEVON RUIZ MD  
Service: ?  
Author Type: Physician  
Type: H&P  
Filed: 2025 15:46  
Note Text:  
Pre-Op History and   
Physical  
HPI: The patient is a   
54 year old female   
presenting for   
pre-operative visit.  
She is scheduled for   
LAVH and BSO, possible   
cystoscopy, for AUB,   
uterine  
fibroids  
on 25. Procedure   
discussed along with   
risks, benefits and   
complications.  
Other  
alternatives discussed   
for management. Consent   
form signed? Yes.  
PAST MEDICAL HISTORY  
Diagnosis Date  
Depression  
High cholesterol  
PAST SURGICAL HISTORY  
Procedure Laterality   
Date  
APPENDECTOMY   
COLONOSCOPY SCREENING   
  
DANDC, DIAG AND/OR   
THERAPEUTIC 10/24/2024  
hysteroscopy  
EXTRACTION ERUPTED   
TOOTH/EXR  
unknown date  
LITHOTRIPSY XTRCORP   
SHOCK WAVE   
Renal Lithotripsy  
Current Outpatient   
Medications  
Medication Sig Dispense   
Refill  
SUMAtriptan (IMITREX)   
50 mg tablet  
MAGNESIUM ORAL Take by   
mouth.  
norethindrone   
(AYGESTIN) 5 mg tablet   
1 tab 3x/day until   
bleeding stops. 1 tab  
2x/day x 2 days. 1 tab   
daily x 5 days 35   
tablet 0  
atorvastatin (LIPITOR)   
20 mg tablet Take 20 mg   
by mouth.  
ARIPiprazole (ABILIFY)   
2 mg tablet Take 2 mg   
by mouth.  
albuterol HFA   
(PROVENTIL HFA,   
VENTOLIN HFA) 90   
mcg/actuation inhaler   
Inhale 2  
puffs every 4 hours by   
inhalation route as   
needed.  
sertraline (ZOLOFT) 100   
mg tablet Take 200 mg   
by mouth.  
No current   
facility-administered   
medications for this   
visit.  
ALLERGIES: Nut -   
Unspecified  
PERSONAL HISTORY:  
Social History  
Tobacco Use  
Smoking status: Never  
Smokeless tobacco:   
Never  
Vaping Use  
Vaping status: Never   
Used  
Substance Use Topics  
Alcohol use: Yes  
Comment: rare  
Drug use: Not Currently  
FAMILY HISTORY:  
FAMILY HISTORY  
Problem Relation Age of   
Onset  
Depression Mother  
Diabetes Mother  
other   
(hypercholesterolemia   
[other]) Mother  
other (htn) Mother  
Thyroid Mother  
Thyroid disease  
Alcohol abuse Father  
other (cva) Father  
Ischemic Heart Disease   
Father  
Diabetes Father  
other   
(hypercholesterolemia   
[other]) Father  
other (TIA) Father  
Parkinson's Father  
other   
(hypercholesterolemia   
[other]) Brother  
Brain Cancer Maternal   
Grandmother  
other (cardiac   
disorder) Maternal   
Grandfather  
other (htn) Maternal   
Grandfather  
other (CVA) Maternal   
Grandfather  
other (Cardiac   
disorder) Paternal   
Grandfather  
other (htn) Paternal   
Grandfather  
Diabetes Paternal   
Grandfather  
REVIEW OF SYMPTOMS:  
GENERAL: denies fevers   
or chills  
ENDOCRINOLOGY: has not   
been on steroids  
Cardiology : denies   
palpitations or chest   
pain  
Respiratory: denies SOB   
or cough  
Hematology: denies   
history of prolonged   
bleeding or easy   
bruising or VTE  
Allergy: Denies history   
of personal or family   
history of allergy to   
anesthesia  
PHYSICAL EXAMINATION:  
VITALS: Blood pressure   
142/86, weight 111.1 kg   
(245 lb), last   
menstrual period  
2025.  
GENERAL: The patient is   
well nourished, well   
hydrated in no acute   
distress. ,  
The patient is oriented   
to time, place, and   
person.  
NECK: Supple. No   
lynphadenopathy, normal   
thyroid, no   
thyromegaly.  
LUNGS: Clear to   
auscultation   
bilaterally. no   
wheezes, rhonchi or   
rales  
HEART: Regular rate and   
rhythm, Normal heart   
sounds, and No murmurs   
or gallops  
GENITALIA: Normal   
external genitalia,   
Urethral meatus normal,   
Bladder nontender,  
normal vagina and   
normal vaginal tone,   
normal cervix, normal   
uterus, size and  
consistency, normal   
adnexa without masses   
or tenderness, and   
perineum WNL  
WET PREP: Not indicated  
IMPRESSION: uterine   
fibroids, AUB  
PLAN: The   
risks/benefits/alternat  
jessie and personal   
involved for the   
planned  
LAVH, BSO, possible   
cystoscopy were   
reviewed with the   
patient. Her questions  
were answered to her   
satisfaction and she   
desires to proceed.   
Consent was  
signed. I reviewed with   
her postop instructions   
and expectations.  
I have reviewed and   
updated past medical   
and surgical history,   
medications and  
allergies  
Devon Ruiz M.D. Normal                                  [facility] SerPl-aCncon 2025   
   
                      Lutropin Qn 14.1 m[IU]/mL Normal     See comment [facility]  
   
                                        Comment on above:   Order Comment: Speci  
men Type: BLOOD SPECIMENOrdering Facility:  
   
[facility] Address: [address] [place] [address]   
   
                                                            Result Comment: Refe  
rence range:  
Follicular: 2.4-12.6 mIU/mL  
Midcycle: 14.0-95.6 mIU/mL  
Luteal: 1.0-11.4 mIU/mL  
Post [place]: 7.7-58.5 mIU/mL   
   
                                                            Performed By: #### 1  
5067-2, 72400-5 ####[facility] LABCLIA [id number] ERASTOJESSICA [facility] [id number] [place], [place] [id number] [facility] OF [facility]   
   
                                                    Javier 2025   
   
                                        CNPN                Telephone (OBGYWM)  
-----------------------  
-----------  
CASSI DE LA TORRE   
([id number]) 1970 F  
Date Time Provider   
Department  
1/3/25 HOUSTON FREEMAN  
During your visit   
today, we recorded the   
following information   
about you:  
Houston Freeman APRN.CNP   
1/3/2025 10:21 AM   
Signed  
Please call patient and   
let her know I   
discussed case with RR,   
who recommends  
hyster over IUD. Please   
schedule consult with   
RR for patient to   
discuss further  
BRIAN Taveras.Latanya Millard, HITESH   
1/3/2025 10:25 AM   
Signed  
Left message for   
patient to call office.  
HITESH Mosqueda Jennifer, RN   
1/3/2025 10:47 AM   
Signed  
Patient notified.   
Appointment with RR to   
discuss scheduled.   
Patient wanted   
to know that the   
Aygestin is helping.   
Thank you, she said.   
Mala Royal RN  
Allergies As of Date:   
2025 Noted   
Allergy Reaction  
NUT - UNSPECIFIED   
2023 10 -   
Anaphylaxis  
Date Reviewed:   
2025  
Reviewed by: Houston Freeman APRN.CNP - Fully   
Assessed  
Reason for Visit:  
Patient Update [1234]  
Prescriptions as of   
2025  
- norethindrone   
(AYGESTIN) 5 mg tablet  
1 tab 3x/day until   
bleeding stops. 1 tab   
2x/day x 2 days. 1 tab   
daily x 5 days  
- atorvastatin   
(LIPITOR) 20 mg tablet  
Take 20 mg by mouth.  
- ARIPiprazole   
(ABILIFY) 2 mg tablet  
Take 2 mg by mouth.  
- albuterol HFA   
(PROVENTIL HFA,   
VENTOLIN HFA) 90   
mcg/actuation inhaler  
Inhale 2 puffs every 4   
hours by inhalation   
route as needed.  
- sertraline (ZOLOFT)   
100 mg tablet  
Take 200 mg by mouth.  
Problem List As Of   
Date: 2025  
(None)  
Encounter Number:   
[id number]  
Encounter Status:Closed   
by HOUSTON FREEMAN on   
1/3/25              Normal                                  [facility]  
   
                                                    CBC panel Auto (Bld)on    
   
                                                    Erythrocyte   
distribution width   
(RBC) [Ratio]   12.7 %                          11.5 - 15.0 %   [facility]  
   
                                                    Hematocrit (Bld)   
[Volume fraction] 39.7 %                          36.0 - 46.0 %   [facility]  
   
                                                    Hemoglobin (Bld)   
[Mass/Vol]          13.3 g/dL                               11.5 - 15.5   
g/dL                                    [facility]  
   
                                                    Interpretation and   
review of   
laboratory results Normal                                          [facility]  
   
                                                    MCH (RBC) [Entitic   
mass]           27.6 pg                         26.0 - 34.0 pg  [facility]  
   
                                                    MCHC (RBC)   
[Mass/Vol]          33.5 g/dL                               30.5 - 36.0   
g/dL                                    [facility]  
   
                                                    MCV (RBC) [Entitic   
vol]                82.4 fL                                 80.0 - 100.0   
fL                                      [facility]  
   
                                                    Nucleated RBC   
(Bld) [#/Vol]                                   NINF            [facility]  
   
                                                    Platelet mean   
volume (Bld)   
[Entitic vol]   9.1 fL                          9.0 - 12.7 fL   [facility]  
   
                                                    Platelets (Bld)   
[#/Vol]         316 10*3/uL                                     [facility]  
   
                          RBC (Bld) [#/Vol] 4.82 10*6/uL              3.90 - 5.2  
0   
m/uL                                    [facility]  
   
                      WBC (Bld) [#/Vol] 7.23 10*3/uL                       [facility]  
   
                                                    Erythrocyte   
distribution width   
(RBC) [Ratio]   12.7 %          Normal          11.5-15.0       [facility]  
   
                                        Comment on above:   Order Comment: Speci  
men Type: BLOOD SPECIMENOrdering Facility:  
   
[facility] Address: [address] [place] [address] [place] [id number]   
   
                                                            Performed By: #### 5  
8479-2 ####[facility] HospitalWNCLIA [id number] [place] [address]   
[place]   
   
                                                    Hematocrit (Bld)   
[Volume fraction] 39.7 %          Normal          36.0-46.0       [facility]  
   
                                        Comment on above:   Order Comment: Speci  
men Type: BLOOD SPECIMENOrdering Facility:  
  
[facility] Address: [address] [place] [address]   
   
                                                            Performed By: #### 5  
8410-2 ####[facility] HospitalLIA [id number] [place] [address]   
[place]   
   
                                                    Hemoglobin (Bld)   
[Mass/Vol]      13.3 g/dL       Normal          11.5-15.5       [facility]  
   
                                        Comment on above:   Order Comment: Speci  
men Type: BLOOD SPECIMENOrdering Facility:  
   
[facility] Address: [address] [place] [address]   
   
                                                            Performed By: #### 5  
8410-2 ####[facility] HospitalLIA [id number] [place] [address]   
[place]   
   
                                                    MCH (RBC) [Entitic   
mass]           27.6 pg         Normal          26.0-34.0       [facility]  
   
                                        Comment on above:   Order Comment: Speci  
men Type: BLOOD SPECIMENOrdering Facility:  
  
[facility] Address: [address] [place] [address]   
   
                                                            Performed By: #### 5  
8410-2 ####[facility] HospitalLIA [id number] [place] [address]   
[place]   
   
                                                    MCHC (RBC)   
[Mass/Vol]      33.5 g/dL       Normal          30.5-36.0       [facility]  
   
                                        Comment on above:   Order Comment: Speci  
men Type: BLOOD SPECIMENOrdering Facility:  
   
[facility] Address: [address] [place] [address]   
   
                                                            Performed By: #### 5  
8410-2 ####[facility] HospitalLIA [id number] [place] [address]   
[place]   
   
                                                    MCV (RBC) [Entitic   
vol]            82.4 fL         Normal          80.0-100.0      [facility]  
   
                                        Comment on above:   Order Comment: Speci  
men Type: BLOOD SPECIMENOrdering Facility:  
  
[facility] Address: [address] [place] [address]   
   
                                                            Performed By: #### 5  
8410-2 ####[facility]   
PERFECTOPisecoNCGIANFRANCO [id number] [place] [address]   
[place]   
   
                                                    Nucleated RBC   
(Bld) [#/Vol]   10*3/uL         Normal          <0.01           [facility]  
   
                                        Comment on above:   Order Comment: Speci  
men Type: BLOOD SPECIMENOrdering Facility:  
   
[facility] Address: [address] [place] [address]   
   
                                                            Performed By: #### 5  
8410-2 ####[facility] HospitalNCSONYA [id number] [place] [address]   
[place]   
   
                                                    Platelet mean   
volume (Bld)   
[Entitic vol]   9.1 fL          Normal          9.0-12.7        [facility]  
   
                                        Comment on above:   Order Comment: Speci  
men Type: BLOOD SPECIMENOrdering Facility:  
  
[facility] Address: [address] [place] [address]   
   
                                                            Performed By: #### 5  
8410-2 ####[facility] HospitalWILLOWA [id number] [place] [address]   
[place]   
   
                                                    Platelets (Bld)   
[#/Vol]         316 10*3/uL     Normal          150-400         [facility]  
   
                                        Comment on above:   Order Comment: Speci  
men Type: BLOOD SPECIMENOrdering Facility:  
   
[facility] Address: [address] [place] [address]   
   
                                                            Performed By: #### 5  
8410-2 ####[facility] HospitalNCLIA [id number] [place] [address]   
[place]   
   
                      RBC (Bld) [#/Vol] 4.82 10*6/uL Normal     3.90-5.20  [facility]  
   
                                        Comment on above:   Order Comment: Speci  
men Type: BLOOD SPECIMENOrdering Facility:  
   
[facility] Address: [address] [place] [address]   
   
                                                            Performed By: #### 5  
8410-2 ####[facility] HospitalNCLIA [id number] [place] [address]   
[place]   
   
                      WBC (Bld) [#/Vol] 7.23 10*3/uL Normal     3.70-11.00 [facility]  
   
                                        Comment on above:   Order Comment: Speci  
men Type: BLOOD SPECIMENOrdering Facility:  
   
[facility] Address: [address] [place] [address]   
   
                                                            Performed By: #### 5  
8410-2 ####[facility] HospitalNCA [id number] [address] OF [facility]   
   
                                                    CNOVon 2025   
   
                                        CNOV                Office Visit (OBGYWM  
)  
-----------------------  
-----------  
CASSI DE LA TORRE   
([id number]) 1970 F  
Date Time Provider   
Department  
25 11:00 AM HOUSTON FREEMAN  
During your visit   
today, we recorded the   
following information   
about you:  
Blood pressure Weight  
124/70 111.6 kg  
Houston Freeman APRN.CNP   
2025 11:17 AM   
Signed  
Chaperone offered:   
Patient declines.  
Cassi De La Torre is a   
54 year old female who   
presents for problem   
visit of  
heavy bleeding for 1   
day.  
HPI: Cassi presents   
for heavy bleeding that   
started at 0200 this   
morning. She  
is bleeding through a   
pad/tampon hourly.   
Denies lightheadedness   
or dizziness.  
Denies pain.   
Hysteroscopy D+C on   
10/24/24 with Dr. Ruiz. Pathology   
benign.  
Small fibroids noted on   
ultrasound 10/2024.  
Pap NILM/HPV negative   
  
OB History  
 T2  L2  
SAB0 IAB0 Ectopic0   
Multiple0 Live Births0  
Gyn History  
LMP: 09/15/2024   
(Approximate), Having   
periods  
Age at Menarche:  
Age at First Pregnancy:  
Age at Menopause:  
Gyn History Comments:  
Sexual Activity: Yes;   
Male  
Contraception:   
Vasectomy  
PAST MEDICAL HISTORY  
Diagnosis Date  
Depression  
High cholesterol  
PAST SURGICAL HISTORY  
Procedure Laterality   
Date  
APPENDECTOMY   
COLONOSCOPY SCREENING   
  
DANDC, DIAG AND/OR   
THERAPEUTIC 10/24/2024  
hysteroscopy  
EXTRACTION ERUPTED   
TOOTH/EXR  
unknown date  
LITHOTRIPSY XTRCORP   
SHOCK WAVE 2009  
Renal Lithotripsy  
FAMILY HISTORY  
Problem Relation Age of   
Onset  
Depression Mother  
Diabetes Mother  
other   
(hypercholesterolemia   
[other]) Mother  
other (htn) Mother  
Thyroid Mother  
Thyroid disease  
Alcohol abuse Father  
other (cva) Father  
Ischemic Heart Disease   
Father  
Diabetes Father  
other   
(hypercholesterolemia   
[other]) Father  
other (TIA) Father  
Parkinson's Father  
other   
(hypercholesterolemia   
[other]) Brother  
Brain Cancer Maternal   
Grandmother  
other (cardiac   
disorder) Maternal   
Grandfather  
other (htn) Maternal   
Grandfather  
other (CVA) Maternal   
Grandfather  
other (Cardiac   
disorder) Paternal   
Grandfather  
other (htn) Paternal   
Grandfather  
Diabetes Paternal   
Grandfather  
Social History  
Tobacco Use  
Smoking status: Never  
Smokeless tobacco:   
Never  
Vaping Use  
Vaping status: Never   
Used  
Substance Use Topics  
Alcohol use: Yes  
Comment: rare  
Drug use: Not Currently  
Current Outpatient   
Medications  
Medication Sig  
atorvastatin (LIPITOR)   
20 mg tablet Take 20 mg   
by mouth.  
ARIPiprazole (ABILIFY)   
2 mg tablet Take 2 mg   
by mouth.  
albuterol HFA   
(PROVENTIL HFA,   
VENTOLIN HFA) 90   
mcg/actuation inhaler   
Inhale 2  
puffs every 4 hours by   
inhalation route as   
needed.  
sertraline (ZOLOFT) 100   
mg tablet Take 200 mg   
by mouth.  
Cetirizine 10 mg cap   
Take 10 mg by mouth.   
(Patient not taking:   
Reported on  
2025)  
No current   
facility-administered   
medications for this   
visit.  
Allergies As of Date:   
2025  
Allergen Noted Reaction  
NUT - UNSPECIFIED   
2023 Anaphylaxis  
Fully Assessed   
2025  
REVIEW OF SYSTEMS  
Expanded ROS: GYN: +   
abnormal uterine   
bleeding  
Allergies and current   
medication updated:Yes  
SENSITIVE EXAM: The   
sensitive examination   
was discussed with the   
Patient or  
Patient's Authorized   
Representative. As   
applicable, any other   
physician,  
advance practice   
provider, medical   
student, or other   
health professional  
student that will be   
observing or involved   
in the sensitive   
examination for  
educational or training   
purposes was discussed   
with the Patient or   
Authorized  
Representative. The   
Patient or Authorized   
Representative has   
agreed to proceed  
with the sensitive   
examination. (Sensitive   
examination includes   
inspection  
and/or palpation of the   
breasts, pelvis,   
prostate and anorectal   
regions).  
EXAM: /70   Wt   
246 lb (111.6kg)   LMP   
09/15/2024  
GENERAL: pleasant,   
 female in no   
apparent distress  
HEENT: Normocephalic,   
atraumatic, mucus   
membranes moist, and no   
lesions  
CHEST: Normal   
inspiratory effort  
PELVIC: external   
genitalia normal,   
normal Bartholin's   
glands, urethra,   
Faxon's  
glands, no vulvar   
lesions, no cervical   
lesions, good vaginal   
support, + menses,  
normal appearing   
perineal body and   
perianal region  
NEURO: alert and   
oriented x3,exam   
grossly non-focal  
EXTREMITIES: normal  
ASSESSMENT AND PLAN:  
1. Abnormal uterine   
bleeding - ICD9: 626.9,   
ICD10: N93.9  
- Bleeding precautions   
reviewed - to go to ER   
if lightheaded, dizzy,   
short of  
breath, or experiencing   
chest pain. CBC ordered  
- Aygestin rx sent  
- Discussed Mirena IUD  
- Cassi requests   
opinion of Dr. Ruiz   
in regard to Mirena IUD   
- will forward  
chart  
BRIAN Taveras.[facility]  
Medical Decision   
Making:  
Problems:  
Low: Acute,   
uncomplicated illness   
or injury  
Data:  
Unique test result(s)   
reviewed: 2  
Unique test(s) ordered:   
1  
Risk:  
Low: Low risk from   
testing/treatment  
Moderate: Drug   
management  
Medical Decision Making   
Level: 4 - Moderate  
Allergies As of Date:   
2025 Noted   
Allergy Reaction  
NUT - UNSPECIFIED   
2023 (more   
content not   
included)...        Normal                                  [facility]  
   
                                                    Javier 2025   
   
                                        CNPN                Telephone (OBGYWM)  
-----------------------  
-----------  
CASSI DE LA TORRE   
([id number]) 1970 F  
Date Time Provider   
Department  
25 DEVON RUIZ OBGYWJEREMI  
During your visit   
today, we recorded the   
following information   
about you:  
Ruby Huynh RN   
2025 8:29 AM Signed  
Patient calling with   
concerns of PMB?   
Patient is currently   
wearing a Super+  
tampon along with a pad   
and is bleeding through   
her tampon and pad   
every hour.  
Bleeding started   
yesterday morning and   
heavy bleeding started   
at about 2 AM.  
Denies any chest pain,   
dizziness, or SOB.   
Currently having   
abdominal cramping,  
mostly in her RLQ that   
she rates at a 3 out of   
10. Patient had a DANDC   
at [facility] on  
10/24/24 for AUB.  
uRby Huynh RN  
Allergies As of Date:   
2025  
(Not on File)  
Date Reviewed: Never   
Reviewed  
Reason for Visit:  
Vaginal Bleeding [203]  
Prescriptions as of   
2025  
- atorvastatin   
(LIPITOR) 20 mg tablet  
Take 20 mg by mouth.  
- ARIPiprazole   
(ABILIFY) 2 mg tablet  
Take 2 mg by mouth.  
- albuterol HFA   
(PROVENTIL HFA,   
VENTOLIN HFA) 90   
mcg/actuation inhaler  
Inhale 2 puffs every 4   
hours by inhalation   
route as needed.  
- sertraline (ZOLOFT)   
100 mg tablet  
Take 200 mg by mouth.  
- Cetirizine 10 mg cap  
Take 10 mg by mouth.  
Problem List As Of   
Date: 2025  
(None)  
Encounter Number:   
[id number]  
Encounter Status:Closed   
by RUBY HUYNH on   
25              [facility]  
   
                                                    Yosef 10-   
   
                                        CNOP                Operative Note (Enc)  
   
(OBGYWM)  
-----------------------  
-----------  
  
Encounter Status:Closed   
by DEVON RUIZ   
on 10/24/24  
Encounter Number:   
[id number]           [facility]  
   
                                                    Javier 10-   
   
                                        CNPN                Telephone (OBGYWM)  
-----------------------  
-----------  
CASSI DE LA TORRE   
([id number]) 1970 F  
Date Time Provider   
Department  
10/17/24 DEVON RUIZ  
During your visit   
today, we recorded the   
following information   
about you:  
Mala Royal RN   
10/17/2024 2:00 PM   
Addendum  
PAT called. Does   
patient need an   
antibiotic for surgery?   
 No antibiotic  was  
not checked either.   
Thank you.  
HITESH Peña Jennifer, RN   
10/18/2024 2:55 PM   
Signed  
Orders refaxed to [facility].   
Mala Royal RN  
Allergies As of Date:   
10/17/2024  
(Not on File)  
Date Reviewed: Never   
Reviewed  
Reason for Visit:  
Surgery Orders [Other]  
Prescriptions as of   
10/18/2024  
- atorvastatin   
(LIPITOR) 20 mg tablet  
Take 20 mg by mouth.  
- ARIPiprazole   
(ABILIFY) 2 mg tablet  
Take 2 mg by mouth.  
- albuterol HFA   
(PROVENTIL HFA,   
VENTOLIN HFA) 90   
mcg/actuation inhaler  
Inhale 2 puffs every 4   
hours by inhalation   
route as needed.  
- sertraline (ZOLOFT)   
100 mg tablet  
Take 200 mg by mouth.  
- Cetirizine 10 mg cap  
Take 10 mg by mouth.  
Problem List As Of   
Date: 10/17/2024  
(None)  
Encounter Number:   
[id number]  
Encounter Status:Closed   
by MALA ROYAL on   
10/18/24            Normal                                  [facility]  
   
                                                    HISTORY PHYSICALon 10-  
4   
   
                                        HISTORY PHYSICAL    HNO ID: [id number]  
Author: DEVON RUIZ MD  
Service: ?  
Author Type: Physician  
Type: H&P  
Filed: 10/16/2024 17:09  
Note Text:  
Pre-Op History and   
Physical  
HPI: The patient is a   
54 year old female   
presenting for   
pre-operative visit.  
She is scheduled for   
Hysteroscopy [facility], for   
AUB  
on 10/24/24. Procedure   
discussed along with   
risks, benefits and   
complications.  
Other  
alternatives discussed   
for management. Consent   
form signed? Yes.  
PAST MEDICAL HISTORY  
Diagnosis Date  
Depression  
High cholesterol  
PAST SURGICAL HISTORY  
Procedure Laterality   
Date  
APPENDECTOMY   
COLONOSCOPY SCREENING   
  
EXTRACTION ERUPTED   
TOOTH/EXR  
unknown date  
LITHOTRIPSY XTRCORP   
SHOCK WAVE 2009  
Renal Lithotripsy  
Current Outpatient   
Medications  
Medication Sig Dispense   
Refill  
atorvastatin (LIPITOR)   
20 mg tablet Take 20 mg   
by mouth.  
ARIPiprazole (ABILIFY)   
2 mg tablet Take 2 mg   
by mouth.  
albuterol HFA   
(PROVENTIL HFA,   
VENTOLIN HFA) 90   
mcg/actuation inhaler   
Inhale 2  
puffs every 4 hours by   
inhalation route as   
needed.  
sertraline (ZOLOFT) 100   
mg tablet Take 200 mg   
by mouth.  
Cetirizine 10 mg cap   
Take 10 mg by mouth.  
No current   
facility-administered   
medications for this   
visit.  
ALLERGIES: Patient has   
no allergy information   
on record.  
PERSONAL HISTORY:  
Social History  
Tobacco Use  
Smoking status: Never  
Smokeless tobacco:   
Never  
Vaping Use  
Vaping status: Never   
Used  
Substance Use Topics  
Alcohol use: Yes  
Comment: rare  
Drug use: Not Currently  
FAMILY HISTORY:  
FAMILY HISTORY  
Problem Relation Age of   
Onset  
Depression Mother  
Diabetes Mother  
other   
(hypercholesterolemia   
[other]) Mother  
other (htn) Mother  
Thyroid Mother  
Thyroid disease  
Alcohol abuse Father  
other (cva) Father  
Ischemic Heart Disease   
Father  
Diabetes Father  
other   
(hypercholesterolemia   
[other]) Father  
other (TIA) Father  
Parkinson's Father  
other   
(hypercholesterolemia   
[other]) Brother  
Brain Cancer Maternal   
Grandmother  
other (cardiac   
disorder) Maternal   
Grandfather  
other (htn) Maternal   
Grandfather  
other (CVA) Maternal   
Grandfather  
other (Cardiac   
disorder) Paternal   
Grandfather  
other (htn) Paternal   
Grandfather  
Diabetes Paternal   
Grandfather  
REVIEW OF SYMPTOMS:  
GENERAL: denies fevers   
or chills  
ENDOCRINOLOGY: has not   
been on steroids  
Cardiology : denies   
palpitations or chest   
pain  
Respiratory: denies SOB   
or cough  
Hematology: denies   
history of prolonged   
bleeding or easy   
bruising or VTE  
Allergy: Denies history   
of personal or family   
history of allergy to   
anesthesia  
PHYSICAL EXAMINATION:  
VITALS: Last menstrual   
period 09/15/2024.  
GENERAL: The patient is   
well nourished, well   
hydrated in no acute   
distress. ,  
The patient is oriented   
to time, place, and   
person.  
WET PREP: Not indicated  
IMPRESSION: PMB,   
thickened endometrium  
PLAN: The   
risks/benefits/alternat  
jessie and personal   
involved for the   
planned  
hysteroscopy [facility] were   
reviewed with the   
patient. Her questions   
were answered to  
her satisfaction and   
she desires to proceed.   
Consent was signed. I   
reviewed  
with her postop   
instructions and   
expectations.  
I have reviewed and   
updated past medical   
and surgical history,   
medications and  
allergies  
Devon Ruiz M.D. [facility] 10-   
   
                                        CNPN                Telephone (OBGYWM)  
-----------------------  
-----------  
CASSI DE LA TORRE   
([id number]) 1970 F  
Date Time Provider   
Department  
10/9/24 DEVON RUIZ OBGYWJEREMI  
During your visit   
today, we recorded the   
following information   
about you:  
Latanya Soto RN   
10/9/2024 9:14 AM   
Signed  
----- Message from   
Devon Ruiz MD   
sent at 10/8/2024 6:26   
PM EDT -----  
See if she wants to   
schedule hysteroscopy   
[facility] and if has   
constraints w/  
timing/location. need   
surgery sheet. If wants   
f/u to discuss first ok   
for  
virtual. MD Selvin Lira Tara, RN   
10/9/2024 9:15 AM   
Signed  
Left message for   
patient to call office.  
HITESH Mosqueda Tara, RN   
10/9/2024 9:24 AM   
Addendum  
Pt notified and pre-op   
appt already scheduled   
virtually 10/16 and   
surgery  
scheduled at [facility]   
10/24/24. Surgery   
 has surgery   
paper. Latanya Soto RN  
Allergies As of Date:   
10/09/2024  
(Not on File)  
Date Reviewed: Never   
Reviewed  
Prescriptions as of   
10/09/2024  
- atorvastatin   
(LIPITOR) 20 mg tablet  
Take 20 mg by mouth.  
- ARIPiprazole   
(ABILIFY) 2 mg tablet  
Take 2 mg by mouth.  
- albuterol HFA   
(PROVENTIL HFA,   
VENTOLIN HFA) 90   
mcg/actuation inhaler  
Inhale 2 puffs every 4   
hours by inhalation   
route as needed.  
- sertraline (ZOLOFT)   
100 mg tablet  
Take 200 mg by mouth.  
- Cetirizine 10 mg cap  
Take 10 mg by mouth.  
Problem List As Of   
Date: 10/09/2024  
(None)  
Encounter Number:   
[id number]  
Encounter Status:Closed   
by LATANYA SOTO on   
10/9/24             Normal                                  [facility]  
   
                                                    US Pelvison 10-   
   
                                                              
Indication  
Postmenopausal   
bleeding, fibroid  
  
Impression  
Anteverted fibroid   
uterus that measures 98   
mm x 47 mm x 59 mm. The   
largest  
fibroids are described   
below.  
Endometrium measures   
7.8 mm which is   
abnormally thickened in   
menopause.  
Normal appearing left   
ovary.  
Right ovary is not   
visualized.  
No adnexal masses were   
observed.  
There is no free fluid   
visualized in the   
peritoneal cavity.  
  
Recommendations  
Recommend endometrial   
evaluation as   
clinically indicated.  
  
Menstrual History  
LMP on 2024.   
Cycle: irregular cycle  
  
Method  
Transabdominal,   
transvaginal, 3D   
ultrasound examination,   
Color Doppler   
examination  
  
Uterus  
Uterus: Visualized  
Uterus position:   
anteverted  
Description of uterine   
malformations: none  
Myometrium:   
heterogeneous  
Endometrium: homogenous  
Cervix details: cystic   
lesions identified   
suggesting superficial   
Nabothian cysts  
Uterus length 98 mm  
Uterus width 59 mm  
Uterus height 47 mm  
Uterus Vol 139.8 cm  
Endometrial thickness,   
total 7.8 mm  
Uterine fibroid D1 8 mm  
Uterine fibroid D2 8 mm  
Uterine fibroid D3 10   
mm  
Uterine fibroid mean   
8.7 mm  
Uterine fibroid vol   
0.335 cm  
Uterine fibroids   
findings: Left lateral   
anterior wall.   
intramural  
  
Right Ovary  
Rt ovary: Not   
visualized  
  
Left Ovary  
Lt ovary: Visualized  
Lt ovary morphology:   
premenopausal normal   
follicular  
Lt ovary D1 34 mm  
Lt ovary D2 24 mm  
Lt ovary D3 22 mm  
Lt ovary Vol 9.4 cm  
  
Cul de Sac  
Visualized. no free   
fluid visualized  
  
Performed By: Kristan Quiros RDMS  
  
Read By: Marianela Cortez M.D.                                            MATERNAL   
FETAL   
MEDICINE  
   
                                                                  [facility]  
   
                                                    Radiology Study   
observation   
(narrative)                                                     [facility]  
   
                                                    CNOVon 2024   
   
                                        CNOV                Office Visit (OBGYWM  
)  
-----------------------  
-----------  
CASSI DE LA TORRE   
([id number]) 1970 F  
Date Time Provider   
Department  
24 2:40 PM   
DEVON RUIZ   
OBGYWM  
During your visit   
today, we recorded the   
following information   
about you:  
Blood pressure Weight   
Height Last Period  
124/78 105.7 kg 1.6 m   
09/15/24  
Devon Ruiz MD   
2024 3:39 PM   
Signed  
Cassi is a 54 year old   
 who presents   
for an annual   
gynecologic exam  
with complaints,   
irregular bleeding. Has   
a h/o of AUB and was   
placed on birth  
control about 2 years   
ago for a year. Only   
had one menses on that.   
Has had 2  
epidodes of heav   
bleeding 4-5 days this   
past year, feb and   
recently. Some pain  
w/ recent one on right   
side. . Had a pelvic US   
last year and thickened  
endometrium and had EMB   
which showed weakly   
proliferative   
endometrium w/ tubal  
metaplasia. FSH levels   
are incrased now, but   
still low 2 years ago.   
Results  
from Aug 2023 FSH 28.   
and LH 20.6  
No signif hot flashes   
or vaginal dryness.  
Postmenopausal:   
uncertain  
HRT use: No.  
Last Pap: normal  
HPV: ncertain  
History of abnormal   
pap: No  
Last mammogram:    
normal  
History of abnormal   
mammogram: No  
Sexually active: Yes  
OB History  
 T2  L2  
SAB0 IAB0 Ectopic0   
Multiple0 Live Births0  
Gyn History  
LMP: 09/15/2024   
(Approximate), Having   
periods  
Age at Menarche:  
Age at First Pregnancy:  
Age at Menopause:  
Gyn History Comments:  
Sexual Activity: Yes;   
Male  
Contraception:   
Vasectomy  
PAST MEDICAL HISTORY  
Diagnosis Date  
Depression  
High cholesterol  
PAST SURGICAL HISTORY  
Procedure Laterality   
Date  
APPENDECTOMY   
COLONOSCOPY SCREENING   
2019  
EXTRACTION ERUPTED   
TOOTH/EXR  
unknown date  
LITHOTRIPSY XTRCORP   
SHOCK WAVE 2009  
Renal Lithotripsy  
FAMILY HISTORY  
Problem Relation Age of   
Onset  
Depression Mother  
Diabetes Mother  
other   
(hypercholesterolemia   
[other]) Mother  
other (htn) Mother  
Thyroid Mother  
Thyroid disease  
Alcohol abuse Father  
other (cva) Father  
Ischemic Heart Disease   
Father  
Diabetes Father  
other   
(hypercholesterolemia   
[other]) Father  
other (TIA) Father  
Parkinson's Father  
other   
(hypercholesterolemia   
[other]) Brother  
Brain Cancer Maternal   
Grandmother  
other (cardiac   
disorder) Maternal   
Grandfather  
other (htn) Maternal   
Grandfather  
other (CVA) Maternal   
Grandfather  
other (Cardiac   
disorder) Paternal   
Grandfather  
other (htn) Paternal   
Grandfather  
Diabetes Paternal   
Grandfather  
SOCIAL HISTORY  
Social History  
Tobacco Use  
Smoking status: Never  
Smokeless tobacco:   
Never  
Vaping Use  
Vaping status: Never   
Used  
Substance Use Topics  
Alcohol use: Yes  
Comment: rare  
Drug use: Not Currently  
REVIEW OF SYSTEMS  
Abdomen: No abdominal   
pain, nausea, vomiting,   
diarrhea, or   
constipation. No  
bloating, early   
satiety, indigestion,   
or increased   
flatulence.  
Bladder: No dysuria,   
gross hematuria,   
urinary frequency,   
urinary urgency, or  
incontinence  
Breast: No breast   
lumps, nipple d/c,   
overlying skin changes,   
redness or skin  
retraction  
Allergies and current   
medication updated:Yes  
SENSITIVE EXAM: The   
sensitive examination   
was discussed with the   
Patient or  
Patient's Authorized   
Representative. As   
applicable, any other   
physician,  
advance practice   
provider, medical   
student, or other   
health professional  
student that will be   
observing or involved   
in the sensitive   
examination for  
educational or training   
purposes was discussed   
with the Patient or   
Authorized  
Representative. The   
Patient or Authorized   
Representative has   
agreed to proceed  
with the sensitive   
examination. (Sensitive   
examination includes   
inspection  
and/or palpation of the   
breasts, pelvis,   
prostate and anorectal   
regions).  
EXAM: /78   Ht 5'   
3  (1.60m)   Wt 233 lb   
(105.7kg)   LMP   
09/15/2024   BMI  
41.28 kg/(m2).  
GENERAL: pleasant,   
 female in no   
apparent distress  
HEENT: Normocephalic,   
atraumatic, mucus   
membranes moist, and no   
lesions  
NECK: Supple, full   
range of motion, no   
adenopathy, and thyroid   
normal  
DERMATOLOGY: Normal,   
without lesions,   
non-icteric, and   
non-hirsute  
BREAST: soft,   
non-tender, symmetric,   
no dominant mass,   
normal nipple-areolar  
complex, no   
lymphadenopathy, and no   
nipple discharge  
CHEST: Normal   
inspiratory effort  
ABDOMEN: soft,   
non-tender, and no   
masses  
PELVIC: external   
genitalia normal,   
normal Bartholin's   
glands, urethra,   
Faxon's  
glands, no vulvar   
lesions, no cervical   
lesions, good vaginal   
support,  
physiologic discharge   
present, normal   
appearing perineal body   
and perianal  
region  
BIMANUAL: uterus normal   
size, shape and   
consistency, no adnexal   
masses, and  
non-tender  
RECTOVAGINAL: deferred.  
NEURO: alert and   
oriented x3,exam   
grossly non-focal  
EXTREMITIES: normal  
ASSESSMENT/PLAN:  
1) Health maintenance:  
Pap done with HPV.  
Mammogram ordered  
Colon cancer screening:   
has ordered  
AUB, likely   
postmenopausal, h/o   
transmural/uterine   
fibroid, tubal   
metaplasia on  
EMB. Recommend repeat   
US and h (more content   
not included)...    Normal                                  [facility]  
   
                                                    HIGH RISK HUMAN PAPILLOMA VI  
BENJY (HPV), PCR FOR DETECTION AND GENOTYPINGon   
2024   
   
                                                    HPV 16 Ag Ql (Unsp   
spec)           Not detected    Normal          Not detected    [facility]  
   
                                        Comment on above:   Order Comment: Speci  
men Type: FLUID SPECIMENOrdering Facility:  
   
[facility] Address: [address] [place] [address]   
   
                                                            Performed By: #### L  
[id number], HPVHRT ####[facility]   
LABIA [id number] [place] [address]   
[place]   
   
                                                    HPV 18 Ag Ql (Unsp   
spec)           Not detected    Normal          Not detected    [facility]  
   
                                        Comment on above:   Order Comment: Speci  
men Type: FLUID SPECIMENOrdering Facility:  
   
[facility] Address: [address] [place] [address]   
   
                                                            Performed By: #### L  
[id number], HPVHRT ####[facility]   
LABCLIA [id number] [place] [address]   
[place]   
   
                                                    HPV   
31+33+35+39+45+51+  
52+56+58+59+66+68   
DNA JOSEMANUEL+probe Ql   
(Cvx)           Not detected    Normal          Not detected    [facility]  
   
                                        Comment on above:   Order Comment: Speci  
men Type: FLUID SPECIMENOrdering Facility:  
   
[facility] Address: [address] [place] [address]   
   
                                                            Result Comment: High  
 Risk HPV Other Type includes HPV types 31, 33,   
35, 39, 45, 51, 52, 56, 58, 59, 66 and 68.   
   
                                                            Performed By: #### L  
[id number], HPVHRT ####[facility]   
LABIA [id number] [place] [address]   
[place]   
   
                                                    PAP TESTon 2024   
   
                                        ADEQUACY            Satisfactory for   
interpretation.     Normal                                  [facility]  
   
                                        Comment on above:   Order Comment: Speci  
men Type: FLUID SPECIMENOrdering Facility:  
   
[facility] Address: [address] [place] [address]   
   
                                                            Performed By: #### L  
[id number], HPVHRT ####[facility]   
LABCLIA [id number] [place] [address]   
[place]   
   
                      CASE REPORT            Normal                [facility]  
   
                                        Comment on above:   Order Comment: Speci  
men Type: FLUID SPECIMENOrdering Facility:  
   
[facility] Address: [address] [place] [address]   
   
                                                            Result Comment: Gyne  
cologic Cytology Report Case: [id number]  
Authorizing Provider: Devon Ruiz MD Collected: 2024   
03:42 PM  
Ordering Location: OB/Gynecology Received: 2024 04:39 PM  
First Screen: [place] Card, CT, ASCP  
Specimen: Pap Test, ThinPrep, Cervix   
   
                                                            Performed By: #### L  
[id number], HPVHRT ####[facility]   
LABCLIA [id number] [place] [address]   
[place]   
   
                                                    CLINICAL HISTORY,   
CYTOLOGY, GYN   Routine Exam    Normal                          [facility]  
   
                                        Comment on above:   Order Comment: Speci  
men Type: FLUID SPECIMENOrdering Facility:  
   
[facility] Address: [address] [place] [address]   
   
                                                            Performed By: #### L  
[id number], HPVHRT ####[facility]   
LABCLIA [id number] [place] [address]   
[place]   
   
                                                    FINAL PERFORMING   
LAB                             Normal                          [facility]  
   
                                        Comment on above:   Order Comment: Speci  
men Type: FLUID SPECIMENOrdering Facility:  
   
[facility] Address: [address] [place] [address]   
   
                                                            Result Comment: Tech  
nical component, cytotechnologist screening   
performed at [facility], [address] [place] [address]   
CLIA# [id number]  
Diagnostic interpretation performed at [facility], [address] [place], [place] [id number] CLIA# [id number]  
: Cm Mejia M.D.   
   
                                                            Performed By: #### L  
[id number], HPVHRT ####[facility]   
LABCLIA [id number] [place] [address]   
[place]   
   
                      HPV REFLEX Yes HPV    Normal                [facility]  
   
                                        Comment on above:   Order Comment: Speci  
men Type: FLUID SPECIMENOrdering Facility:  
   
[facility] Address: [address] [place] [address]   
   
                                                            Performed By: #### L  
[id number], HPVHRT ####[facility]   
LABCLIA [id number] [place] [address]   
[place]   
   
                                                    INTERPRETATION,   
CYTOLOGY, GYN                   Normal                          [facility]  
   
                                        Comment on above:   Order Comment: Speci  
men Type: FLUID SPECIMENOrdering Facility:  
   
[facility] Address: [address] [place] [address]   
   
                                                            Result Comment: Nega  
tive for intraepithelial lesion or malignancy.  
Electronically signed by [place] Card CT, ASCP on 10/2/2024 at   
9:18 AM   
   
                                                            Performed By: #### L  
[id number], HPVHRT ####[facility]   
LABCLIA [id number] [place] [address]   
[place]   
   
                      LMP        9/15/2024  Normal                [facility]  
   
                                        Comment on above:   Order Comment: Speci  
men Type: FLUID SPECIMENOrdering Facility:  
   
[facility] Address: [address] [place] [address]   
   
                                                            Performed By: #### L  
[id number], HPVHRT ####[facility]   
LABCLIA [id number] [place] [address]   
[place]   
   
                                                    PAP DISCLAIMER   
COMMENT                                 The Pap Smear is a   
screening test for   
cervical cancer. False   
negative results occur   
with all screening   
tests, emphasizing the   
need for rescreening at   
recommended intervals,   
and clinical   
correlation.        Normal                                  [facility]  
   
                                        Comment on above:   Order Comment: Speci  
men Type: FLUID SPECIMENOrdering Facility:  
   
[facility] Address: [address] [place] [address]   
   
                                                            Performed By: #### L  
[id number], HPVHRT ####[facility]   
LABCLIA [id number] [address]   
   
                                        PAP IMAGER COMMENT  This specimen has be  
en   
analyzed by the   
ThinPrep Imaging   
System, an automated   
imaging and review   
system, which assists   
the laboratory in   
evaluating cells on   
ThinPrep Pap tests.   
Following automated   
imaging, selected   
fields from every slide   
are reviewed by a   
cytotechnologist.   Normal                                  [facility]  
   
                                        Comment on above:   Order Comment: Speci  
men Type: FLUID SPECIMENOrdering Facility:  
   
[facility] Address: [address] [place] [address]   
   
                                                            Performed By: #### L  
[id number], HPVHRT ####[facility]   
LABCLIA [id number] [address] OF [facility]   
   
                                                    CNPNon 2024   
   
                                        CNPN                Telephone (OBGYWM)  
-----------------------  
-----------  
CASSI DE LA TORRE   
([id number]) 1970 F  
Date Time Provider   
Department  
24 DEVON RUIZ OBGY  
During your visit   
today, we recorded the   
following information   
about you:  
Latanya Soto RN   
2024 1:40 PM   
Addendum  
Medical records   
received from [facility].   
Pt has upcoming new Pt  
appt on 24 w/ RR.   
Records placed with   
chart prep.Some health  
information/history   
updated in Pt's chart   
with what records we   
received. Latanya Soto RN  
Allergies As of Date:   
2024  
(Not on File)  
Date Reviewed: Never   
Reviewed  
Problem List As Of   
Date: 2024  
(None)  
Encounter Number:   
[id number]  
Encounter Status:Closed   
by LATANYA SOTO on   
24             Normal                                  [facility]  
   
                                                    Basic metabolic 2000 panelon  
 2024   
   
                                                    Anion gap   
[Moles/Vol]     11 mmol/L       Normal          10-20           [facility]  
   
                                        Comment on above:   Performed By: #### 2  
[id number] ####  
AYSE SANCHEZ ([id number])  
[facility] ([facility])  
[address] [place] [address]   
   
                      Calcium [Mass/Vol] 9.2 mg/dL  Normal     8.6-10.3   [facility]  
   
                                        Comment on above:   Performed By: #### 2  
[id number] ####  
AYSE SANCHEZ ([id number])  
[facility] ([facility])  
[address] [place] [address]   
   
                                                    Chloride   
[Moles/Vol]     106 mmol/L      Normal                    [facility]  
   
                                        Comment on above:   Performed By: #### 2  
4321-2 ####  
AYSE SANCHEZ ([id number])  
[facility] ([facility])  
[address] [place] [address]   
   
                      CO2 [Moles/Vol] 28 mmol/L  Normal     21-32      [facility]  
   
                                        Comment on above:   Performed By: #### 2  
4321-2 ####  
AYSE SANCHEZ ([id number])  
[facility] ([facility])  
[address] [place] [address]   
   
                                                    Creatinine   
[Mass/Vol]      0.91 mg/dL      Normal          0.50-1.05       [facility]  
   
                                        Comment on above:   Performed By: #### 2  
4321-2 ####  
AYSE SANCHEZ ([id number])  
[facility] ([facility])  
[address] [place] [address]   
   
                                                    Glomerular   
filtration   
rate/1.73 sq   
M.predicted     74 mL/min/1.73m*2 Normal          >60             [facility]  
   
                                        Comment on above:   Result Comment: Calc  
ulations of estimated GFR are performed   
using   
the  CKD-EPI Study Refit equation without the race variable for   
the IDMS-Traceable creatinine methods.  
https://jasn.asnjournals.org/content/early//ASN.272418422  
8   
   
                                                            Performed By: #### 2  
4321-2 ####  
AYSE SANCHEZ ([id number])  
[facility] ([facility])  
[address] [place] [address]   
   
                      Glucose [Mass/Vol] 94 mg/dL   Normal     74-99      [facility]  
   
                                        Comment on above:   Performed By: #### 2  
4321-2 ####  
AYSE SANCHEZ ([id number])  
[facility] ([facility])  
[facility] [place] [address]   
   
                                                    Potassium   
[Moles/Vol]     4.1 mmol/L      Normal          3.5-5.3         [facility]  
   
                                        Comment on above:   Performed By: #### 2  
4321-2 ####  
AYSE SANCHEZ ([id number])  
[facility] ([facility])  
[address] [place] [address]   
   
                      Sodium [Moles/Vol] 141 mmol/L Normal     136-145    [facility]  
   
                                        Comment on above:   Performed By: #### 2  
4321-2 ####  
AYSE SANCHEZ ([id number])  
[facility] ([facility])  
[facility] [place] [address]   
   
                                                    Urea nitrogen   
[Mass/Vol]      18 mg/dL        Normal          6-23            [facility]  
   
                                        Comment on above:   Performed By: #### 2  
4321-2 ####  
AYSE SANCHEZ ([id number])  
[facility] ([facility])  
[address] [place] [address]   
   
                                                    XR FOOT RIGHT 3+ VIEWS (ENA TUCKER)on 2024   
   
                                                    XR FOOT RIGHT 3+   
VIEWS (STANDARD)                        No acute fractures or   
dislocations noted.  
No gross deformity   
noted.  
Very small posterior   
calcaneal enthesophyte   
noted.  
Os trigonum noted.  
Dictated by: JULIETH PENN on  7:41:05 PM EDT  
Transcribed by: JULIETH PENN on  7:41:05 PM EDT  
Finalized by: JULIETH PENN on  7:41:05 PM EDT Normal                                  [facility]   
Ambulatory  
   
                                        Comment on above:   Order Comment: Injur  
y/Trauma or Illness?:Injury/Trauma  
How long have you had these symptoms (acute/chronic)?:Acute  
Reason for exam?:pain  
History of cancer?:no  
Surgeries, chemotherapy, or radiation?:no  
Type of Exam?:Initial  
Mechanism of injury?:slip/strain   
   
                                                    XR FOOT RIGHT 3+ VIEWS (ENA  
DARD)on 2024   
   
                                                    XR FOOT RIGHT 3+   
VIEWS (STANDARD)                        No acute fractures or   
dislocations noted.  
No gross deformity   
noted.  
Very small posterior   
calcaneal enthesophyte   
noted.  
Os trigonum noted.  
Dictated by: JULIETH PENN on Sun Jul 14,   
2024 10:53:29 PM EDT  
Transcribed by: JULIETH PENN on Sun Jul 14,   
2024 10:53:29 PM EDT  
Finalized by: JULIETH PENN on Sun Jul 14,   
2024 10:53:29 PM EDT Normal                                  [facility]   
Ambulatory  
   
                                        Comment on above:   Order Comment: Injur  
y/Trauma or Illness?:Illness/Other  
How long have you had these symptoms (acute/chronic)?:Chronic  
Reason for exam?:pain  
History of cancer?:no  
Surgeries, chemotherapy, or radiation?:no  
Type of Exam?:Initial  
Additional signs and symptoms?:no   
   
                                                    Basic metabolic 2000 panelon  
 2024   
   
                                                    Anion gap   
[Moles/Vol]     11 mmol/L       Normal          10-20           [facility]  
   
                                        Comment on above:   Performed By: #### 2  
4321-2 ####  
AYSE SANCHEZ ([id number])  
[facility] ([facility])  
[address] [place] [address]   
   
                      Calcium [Mass/Vol] 9.0 mg/dL  Normal     8.6-10.3   [facility]  
   
                                        Comment on above:   Performed By: #### 2  
4321-2 ####  
AYSE SANCHEZ ([id number])  
[facility] ([facility])  
[address] [place] [address]   
   
                                                    Chloride   
[Moles/Vol]     105 mmol/L      Normal                    [facility]  
   
                                        Comment on above:   Performed By: #### 2  
4321-2 ####  
AYSE SANCHEZ ([id number])  
[facility] ([facility])  
[address] [place] [address]   
   
                      CO2 [Moles/Vol] 27 mmol/L  Normal     21-32      [facility]  
   
                                        Comment on above:   Performed By: #### 2  
4321-2 ####  
AYSE SANCHEZ ([id number])  
[facility] ([facility])  
[address] [place] [address]   
   
                                                    Creatinine   
[Mass/Vol]      0.86 mg/dL      Normal          0.50-1.05       [facility]  
   
                                        Comment on above:   Performed By: #### 2  
4321-2 ####  
AYSE SANCHEZ ([id number])  
[facility] ([facility])  
[address] [place] [address]   
   
                                                    Glomerular   
filtration   
rate/1.73 sq   
M.predicted     80 mL/min/1.73m*2 Normal          >60             [facility]  
   
                                        Comment on above:   Result Comment: Calc  
ulations of estimated GFR are performed   
using   
the  CKD-EPI Study Refit equation without the race variable for   
the IDMS-Traceable creatinine methods.  
https://jasn.asnjournals.org/content/early/ASN.340733920  
8   
   
                                                            Performed By: #### 2  
4321-2 ####  
AYSE SANCHEZ ([id number])  
[facility] ([facility])  
[address] [place] [address]   
   
                      Glucose [Mass/Vol] 91 mg/dL   Normal     74-99      [facility]  
   
                                        Comment on above:   Performed By: #### 2  
4321-2 ####  
AYSE SANCHEZ ([id number])  
[facility] ([facility])  
[address] [place] [address]   
   
                                                    Potassium   
[Moles/Vol]     4.3 mmol/L      Normal          3.5-5.3         [facility]  
   
                                        Comment on above:   Performed By: #### 2  
4321-2 ####  
AYSE SANCHEZ ([id number])  
[facility] ([facility])  
[address] [place] [address]   
   
                      Sodium [Moles/Vol] 139 mmol/L Normal     136-145    [facility]  
   
                                        Comment on above:   Performed By: #### 2  
4321-2 ####  
AYSE SANCHEZ ([id number])  
[facility] ([facility])  
[address] [place] [address]   
   
                                                    Urea nitrogen   
[Mass/Vol]      19 mg/dL        Normal          6-23            [facility]  
   
                                        Comment on above:   Performed By: #### 2  
4321-2 ####  
AYSE ASNCHEZ ([id number])  
[facility] ([facility])  
[address] [place] [address]   
   
                                                    HbA1c (Bld) [Mass fraction]o  
n 2024   
   
                                                    Average glucose   
Estimated from   
glycated   
hemoglobin (Bld)   
[Mass/Vol]          111 mg/dL           Normal              Not   
Established                             [facility]  
   
                                        Comment on above:   Order Comment: Diagn  
osis of Diabetes-Adults  
Non-Diabetic: < or = 5.6%  
Increased risk for developing diabetes: 5.7-6.4%  
Diagnostic of diabetes: > or = 6.5%  
Monitoring of Diabetes  
Age (y)....................... Therapeutic Goal (%)  
Adults: >18.........................<7.0  
Pediatrics: 13-18...................<7.5  
Pediatrics: 7-12....................<8.0  
Pediatrics: 0-6..................... 7.5-8.5  
American Diabetes Association. Diabetes Care 33(S1)   
   
                                                            Performed By: #### 4  
548-4 ####  
TAVERA DANIEL ([id number])  
[facility] ([facility])  
[address] [place] [address]   
   
                                                    Hemoglobin A1c/Hemoglobin.to  
akbar 2024   
   
                                                    HbA1c (Bld) [Mass   
fraction]       5.5 %           Normal          see below       [facility]  
   
                                        Comment on above:   Order Comment: Diagn  
osis of Diabetes-Adults  
Non-Diabetic: < or = 5.6%  
Increased risk for developing diabetes: 5.7-6.4%  
Diagnostic of diabetes: > or = 6.5%  
Monitoring of Diabetes  
Age (y)....................... Therapeutic Goal (%)  
Adults: >18.........................<7.0  
Pediatrics: 13-18...................<7.5  
Pediatrics: 7-12....................<8.0  
Pediatrics: 0-6..................... 7.5-8.5  
American Diabetes Association. Diabetes Care 33(S1), 2010   
   
                                                            Performed By: #### 4  
548-4 ####  
AYSE SANCHEZ ([id number])  
[facility] ([facility])  
[facility] [place] [address]   
   
                                                    Lipid 1996 panelon   
4   
   
                                                    Cholesterol   
[Mass/Vol]      212 mg/dL       High            0-199           [facility]  
   
                                        Comment on above:   Result Comment:  
Age Desirable Borderline High High  
0-19 Y 0 - 169 170 - 199 >/= 200  
20-24 Y 0 - 189 190 - 224 >/= 225  
>24 Y 0 - 199 200 - 239 >/= 240  
**All ranges are based on fasting samples. Specific  
therapeutic targets will vary based on patient-specific  
cardiac risk.  
Pediatric guidelines reference:Pediatrics 2011, 128(S5).Adult   
guidelines reference: NCEP ATPIII Guidelines,KEI 2001, 258:2486-97  
Venipuncture immediately after or during the administration of   
Metamizole may lead to falsely low results. Testing should be   
performed immediately prior to Metamizole dosing.   
   
                                                            Performed By: #### 2  
4331-1 ####  
AYSE SANCHEZ ([id number])  
[facility] ([facility])  
[address] [place] [address]   
   
                                                    Cholesterol in HDL   
[Mass/Vol]      49.0 mg/dL      Normal                          [facility]  
   
                                        Comment on above:   Result Comment:  
Age Very Low Low Normal High  
0-19 Y < 35 < 40 40-45 ----  
20-24 Y ---- < 40 >45 ----  
>24 Y ---- < 40 40-60 >60   
   
                                                            Performed By: #### 2  
4331-1 ####  
AYSE SANCHEZ ([id number])  
[facility] ([facility])  
[facility] [place] [address]   
   
                                                    Cholesterol in LDL   
[Mass/Vol]      133 mg/dL       High            <=99            [facility]  
   
                                        Comment on above:   Result Comment:  
Near Borderline  
AGE Desirable Optimal High High Very High  
0-19 Y 0 - 109 --- 110-129 >/= 130 ----  
20-24 Y 0 - 119 --- 120-159 >/= 160 ----  
>24 Y 0 - 99 100-129 130-159 160-189 >/=190   
   
                                                            Performed By: #### 2  
4331-1 ####  
AYSE SANCHEZ ([id number])  
[facility] ([facility])  
[address] [place] [address]   
   
                                                    Cholesterol in   
VLDL [Mass/Vol] 30 mg/dL        Normal          0-40            [facility]  
   
                                        Comment on above:   Performed By: #### 2  
4331-1 ####  
AYSE SANCHEZ ([id number])  
[facility] ([facility])  
[address] [place] [address]   
   
                                                    CHOLESTEROL/HDL   
RATIO           4.3             Normal                          [facility]  
   
                                        Comment on above:   Result Comment:  
Ref Values  
Desirable < 3.4  
High Risk > 5.0   
   
                                                            Performed By: #### 2  
4331-1 ####  
AYSE SANCHEZ ([id number])  
[facility] ([facility])  
[address] [place] [address]   
   
                                                    NON HDL   
CHOLESTEROL     163 mg/dL       High            0-149           [facility]  
   
                                        Comment on above:   Result Comment:  
Age Desirable Borderline High High Very High  
0-19 Y 0 - 119 120 - 144 >/= 145 >/= 160  
20-24 Y 0 - 149 150 - 189 >/= 190 ----  
>24 Y 30 mg/dL above LDL Cholesterol goal   
   
                                                            Performed By: #### 2  
4331-1 ####  
AYSE SANCHEZ ([id number])  
[facility] ([facility])  
[address] [place] [address]   
   
                                                    Triglyceride   
[Mass/Vol]      150 mg/dL       High            0-149           [facility]  
   
                                        Comment on above:   Result Comment:  
Age Desirable Borderline High High Very High  
0 D-90 D 19 - 174 ---- ---- ----  
91 D- 9 Y 0 - 74 75 - 99 >/= 100 ----  
10-19 Y 0 - 89 90 - 129 >/= 130 ----  
20-24 Y 0 - 114 115 - 149 >/= 150 ----  
>24 Y 0 - 149 150 - 199 200- 499 >/= 500  
Venipuncture immediately after or during the administration of   
Metamizole may lead to falsely low results. Testing should be   
performed immediately prior to Metamizole dosing.   
   
                                                            Performed By: #### 2  
4331-1 ####  
TAVERA DANIEL ([id number])  
[facility] ([facility])  
[address] [place] [id number]   
   
                                                    XR CHEST 2 VIEWSon    
   
                                        XR CHEST 2 VIEWS    Interpreted By:   
Dioni Luther,  
STUDY:  
XR CHEST 2 VIEWS;   
2024 8:38 am  
  
INDICATION:  
Signs/Symptoms:cough,   
SOB.  
  
COMPARISON:  
10/23/2018  
  
ACCESSION NUMBER(S):  
[id number]  
  
ORDERING CLINICIAN:  
JANIE PARISH  
  
TECHNIQUE:  
2 radiographs of the   
chest were performed.  
  
FINDINGS:  
The heart is of normal   
size and contour. The   
pulmonary vessels are  
within normal limits.   
The lungs and the   
pleural spaces are   
clear.  
There is no   
pneumothorax. The   
osseous structures are   
intact.  
  
IMPRESSION:  
No sign of acute   
cardiopulmonary   
disease.  
  
Signed by: Dioni Luther 2024   
11:30 PM  
Dictation workstation:   
[id number]        [facility]  
   
                                                    OB/GYN - Office Visiton    
   
                                                    OB/GYN - Office   
Visit                                   Provider Impressions  
Cassi is a 53-year-old   
woman who comes in to   
discuss her results   
from biopsy ultrasound   
and blood work. Her   
ultrasound revealed   
approximately a 4+   
centimeter fibroid   
insert her uterus that   
reached the serosa as   
well as the uterine   
mucosa. This fibroid   
could certainly account   
for her bleeding   
episodes. Her blood   
work showed that she   
was perimenopausal.   
Additionally the biopsy   
came back benign weakly   
proliferative   
endometrium. Discussed   
with the patient the   
option of expectant   
management versus   
considering an IUD   
versus an endometrial   
ablation or even a   
hysterectomy at this   
point since the patient   
feels that it has not   
significantly impacted   
her quality of life she   
would prefer expectant   
management but was   
given precautions to   
come back in for   
further discussion of   
treatment if she starts   
having heavy bleeding   
episodes or prolonged   
bleeding episodes.   
Otherwise we will see   
her back in 1 year and   
recheck for hormones to   
determine if she is   
menopausal  
  
Chief Complaint  
Patient is here to   
review endometrial   
biopsy results.  
  
History of Present   
IllnessWilliam comes in to   
discuss the results of   
her endometrial biopsy   
and the results of her   
blood work and   
ultrasound.  
  
Active Problems  
Problems  
BMI 39.0-39.9,adult   
(V85.39) (Z68.39)  
Class 2 severe obesity   
due to excess calories   
with serious   
comorbidity and body   
mass  
index (BMI) of 39.0 to   
39.9 in adult   
(278.01,V85.39)   
(E66.01,Z68.39)  
Depression, major,   
single episode, mild   
(296.21) (F32.0)  
DJD (degenerative joint   
disease) (715.90)   
(M19.90)  
Eczema (692.9) (L30.9)  
Elevated alkaline   
phosphatase level   
(790.5) (R74.8)  
Encounter for   
immunization (V03.89)   
(Z23)  
Encounter for mammogram   
to establish baseline   
mammogram (V76.12)   
(Z12.31)  
Encounter for screening   
mammogram for breast   
cancer (V76.12)   
(Z12.31)  
Encounter for screening   
mammogram for malignant   
neoplasm of breast   
(V76.12)  
(Z12.31)  
Fatigue (780.79)   
(R53.83)  
History of TMJ disorder   
(V13.59) (Z87.39)  
Hyperglycemia (790.29)   
(R73.9)  
Menopause (627.2)   
(Z78.0)  
Menstrual irregularity   
(626.4) (N92.6)  
Migraine (346.90)   
(G43.909)  
Mixed hyperlipidemia   
(272.2) (E78.2)  
Neck pain, chronic   
(723.1,338.29)   
(M54.2,G89.29)  
Nocturnal hypoxemia   
(327.24) (G47.34)  
Pap smear for cervical   
cancer screening   
(V76.2) (Z12.4)  
Personal history of   
colonic polyps (V12.72)   
(Z86.010)  
Post-menopausal   
bleeding (627.1)   
(N95.0)  
Screening for cervical   
cancer (V76.2) (Z12.4)  
Screening for heart   
disease (V81.2) (Z13.6)  
Snoring (786.09)   
(R06.83)  
Taking medication for   
chronic disease (799.9)   
(R69)  
Vaginal itching (698.1)   
(N89.8)  
Vulvitis (616.10)   
(N76.2)  
Women's annual routine   
gynecological   
examination (V72.31)   
(Z01.419)  
Past Medical History  
Problems  
History of mammogram   
(V15.89) (Z92.89)  
  
History of Menstruation  
age 12  
History of Normal   
vaginal delivery (650)   
(O80)  
2008 40 weeks,   
female, 7lbs  
2007 40 weeks,   
female 7 lbs  
History of Pap test, as   
part of routine   
gynecological   
examination (V76.2)   
(Z01.419)  
  
Surgical History  
Problems  
History of Appendectomy  
  
History of Colonoscopy  
2019  
History of Renal   
lithotripsy  
  
History of [place] tooth   
extraction  
Family History  
Mother  
Family history of   
depression (V17.0)   
(Z81.8)  
Family history of   
diabetes mellitus   
(V18.0) (Z83.3)  
Family history of   
hypercholesterolemia   
(V18.19) (Z83.42)  
Family history of   
hypertension (V17.49)   
(Z82.49)  
Family history of   
thyroid disease   
(V18.19) (Z83.49)  
Father  
Family history of   
alcoholism (V17.0)   
(Z81.1)  
Family history of   
cerebrovascular   
accident (CVA) (V17.1)   
(Z82.3)  
Family history of   
coronary artery disease   
(V17.3) (Z82.49)  
Family history of   
diabetes mellitus   
(V18.0) (Z83.3)  
Family history of   
hypercholesterolemia   
(V18.19) (Z83.42)  
Family history of TIA   
(transient ischemic   
attack)  
Brother  
Family history of   
hypercholesterolemia   
(V18.19) (Z83.42)  
Maternal Grandmother  
Family history of   
malignant neoplasm of   
brain (V16.8) (Z80.8)  
Maternal Great   
Grandmother  
Family history of   
cardiac disorder   
(V17.49) (Z82.49)  
Family history of   
cerebrovascular   
accident (CVA) (V17.1)   
(Z82.3)  
Family history of   
hypertension (V17.49)   
(Z82.49)  
Paternal Great   
Grandmother  
Family history of   
cardiac disorder   
(V17.49) (Z82.49)  
Family history of   
hypertension (V17.49)   
(Z82.49)  
Maternal Grandfather  
Family history of   
cardiac disorder   
(V17.49) (Z82.49)  
Family history of   
cerebrovascular   
accident (CVA) (V17.1)   
(Z82.3)  
Family history of   
hypertension (V17.49)   
(Z82.49)  
Paternal Grandfather  
Family history of   
cardiac disorder   
(V17.49) (Z82.49)  
Family history of   
hypertension (V17.49)   
(Z82.49)  
Aunt  
Family history of   
diabetes mellitus   
(V18.0) (Z83.3)  
Social History  
Problems  
Daily caffeine   
consumption, 2-3   
servings a day  
Does not use illicit   
drugs (V49.89) (Z78.9)  
Does not use tobacco   
(V49.89) (Z78.9)  
Never smoked cigarettes   
(V49.89) (Z78.9)  
No recent foreign   
travel  
Patient has living will   
(V49.89) (more content   
not included)...    Normal                                   [facility]  
   
                                                    [facility] + [facility] 2023   
   
                                                    FOLLICLE STIM.   
HORMONE         28.8 IU/L       Normal                          [facility]  
   
                                        Comment on above:   Result Comment: REF   
VALUES  
FOLLICULAR 2-12  
MID-CYCLE 12-25  
LUTEAL PHASE 2-12  
MENOPAUSE   
PREPUBERTY 50% ADULT  
ADULT MALE 2-10  
INFANTS 0-1   
   
                                                            Performed By: #### F  
[facility] ####  
[facility]  
[address]  
[place] [address]   
   
                                                    LUTEINIZING   
HORMONE         20.6 IU/L       Normal                          [facility]  
   
                                        Comment on above:   Result Comment: REF   
VALUES  
FOLLICULAR PHASE 1.9-12.5  
MID-CYCLE 8.7-76.3  
LUTEAL PHASE 0.5-16.9  
POST MENOPAUSE 5.0-55.2  
CHILDREN 0- 6.0  
ADULT MALE 18-70 1.5- 9.3  
ADULT MALE >70 3.1-34.6   
   
                                                            Performed By: #### F  
SH ####  
[facility]  
[address]  
[place] [address]   
   
                                                    Laboratory - Chemistry and C  
hemistry - challengeon 2023   
   
                      Follitropin Qn 28.8 {IU/L}                       [facility]  
[facility]  
[place]  
Work Phone:   
[phone]  
3  
   
                                        Comment on above:   REF VALUESFOLLICULAR  
 2-12MID-CYCLE 12-25LUTEAL PHASE 2-  
12MENOPAUSE   
30-150PREPUBERTY 50% ADULTADULT MALE 2-10INFANTS 0-1   
   
                      Lutropin Qn 20.6 {IU/L}                       [facility]  
[facility]  
[place]  
Work Phone:   
[phone]  
3  
   
                                        Comment on above:   REF VALUESFOLLICULAR  
 PHASE 1.9-12.5MID-CYCLE 8.7-76.3LUTEAL   
PHASE   
0.5-16.9POST MENOPAUSE 5.0-55.2CHILDREN 0- 6.0ADULT MALE 18-70 1.5-   
9.3ADULT MALE >70 3.1-34.6   
   
                                                    LMPon 2023   
   
                                                    Last menstrual   
period start date 2023                                       [facility]  
Work Phone:   
[phone]  
3  
   
                                                    No Panel Informationon    
   
                                                                  [facility]  
Work Phone:   
[phone]  
3  
   
                                                    OB/GYN - Office Visiton    
   
                                                    OB/GYN - Office   
Visit                                   Diagnoses/Problems  
Assessed  
Menopause (627.2)   
(Z78.0)  
Orders  
FSH + LH;   
Status:Active;   
Requested   
for:2023;  
Surgical Pathology;   
Status:In Progress -   
Specimen/Data   
Collected,Retrospective  
Authorization; Done:   
2023  
Type : Biopsy  
Fixative : Formalin  
Site A : ENDOMETRIAL   
BIOPSY  
Provider Impressions  
Cassi is here for   
postmenopausal bleeding   
and an abnormal   
thickened endometrial   
stripe. Endometrial   
biopsy was done. We   
will recheck hormone   
levels since her last   
levels suggested that   
she was menopausal   
however they could also   
be consistent with   
perimenopause if she   
had not been off her   
birth control pills for   
long enough.   
Additionally reviewed   
the ultrasound which   
showed that the   
endometrial stripe was   
9 mm and she had a 4+   
centimeter uterine   
fibroid that had   
serosal and mucosal   
involvement. Scant   
tissue was retrieved on   
endometrial biopsy   
therefore suspect we   
are dealing with an   
endometrial polyp we   
will see her back in 3   
weeks and will probably   
recommend a   
hysteroscopy DANDC   
polypectomy  
  
Chief Complaint  
PT HERE TODAY FOR   
FOLLOW UP BLOOD WORK   
AND U/S. PT STATES SHE   
DID HAVE PERIOD LAST   
MONTH AND THIS MONTH.   
PT HAS NO CONCERNS.  
  
History of Present   
IllnessMaia is a   
53-year-old woman who   
comes in referred from   
her PCP for an   
ultrasound that was   
done and showed   
thickened endometrium.   
Patient's hormone   
levels were checked a   
year and a half ago and   
she was not menopausal.   
At that point we   
started birth control   
pills which she   
discontinued this   
spring. She had her   
hormone levels checked   
and they were mildly as   
low evaded suggesting   
that she was   
perimenopausal but she   
does not recall how   
long it had been since   
she had taken birth   
control pills before   
she had her blood work   
done. Patient has had a   
bleeding episode last   
month and again this   
month. She is wondering   
if she is menopausal or   
what could possibly be   
causing these recurrent   
episodes of bleeding.  
  
Active Problems  
Problems  
BMI 39.0-39.9,adult   
(V85.39) (Z68.39)  
Class 2 severe obesity   
due to excess calories   
with serious   
comorbidity and body   
mass  
index (BMI) of 39.0 to   
39.9 in adult   
(278.01,V85.39)   
(E66.01,Z68.39)  
Depression, major,   
single episode, mild   
(296.21) (F32.0)  
DJD (degenerative joint   
disease) (715.90)   
(M19.90)  
Eczema (692.9) (L30.9)  
Elevated alkaline   
phosphatase level   
(790.5) (R74.8)  
Encounter for   
immunization (V03.89)   
(Z23)  
Encounter for mammogram   
to establish baseline   
mammogram (V76.12)   
(Z12.31)  
Encounter for screening   
mammogram for breast   
cancer (V76.12)   
(Z12.31)  
Encounter for screening   
mammogram for malignant   
neoplasm of breast   
(V76.12)  
(Z12.31)  
Fatigue (780.79)   
(R53.83)  
History of TMJ disorder   
(V13.59) (Z87.39)  
Hyperglycemia (790.29)   
(R73.9)  
Menopause (627.2)   
(Z78.0)  
Menstrual irregularity   
(626.4) (N92.6)  
Migraine (346.90)   
(G43.909)  
Mixed hyperlipidemia   
(272.2) (E78.2)  
Neck pain, chronic   
(723.1,338.29)   
(M54.2,G89.29)  
Nocturnal hypoxemia   
(327.24) (G47.34)  
Pap smear for cervical   
cancer screening   
(V76.2) (Z12.4)  
Personal history of   
colonic polyps (V12.72)   
(Z86.010)  
Screening for cervical   
cancer (V76.2) (Z12.4)  
Screening for heart   
disease (V81.2) (Z13.6)  
Snoring (786.09)   
(R06.83)  
Taking medication for   
chronic disease (799.9)   
(R69)  
Vaginal itching (698.1)   
(N89.8)  
Vulvitis (616.10)   
(N76.2)  
Women's annual routine   
gynecological   
examination (V72.31)   
(Z01.419)  
Past Medical History  
Problems  
History of mammogram   
(V15.89) (Z92.89)  
  
History of Menstruation  
age 12  
History of Normal   
vaginal delivery (650)   
(O80)  
2008 40 weeks,   
female, 7lbs  
2007 40 weeks,   
female 7 lbs  
History of Pap test, as   
part of routine   
gynecological   
examination (V76.2)   
(Z01.419)  
  
Surgical History  
Problems  
History of Appendectomy  
  
History of Colonoscopy  
2019  
History of Renal   
lithotripsy  
  
History of [place] tooth   
extraction  
Family History  
Mother  
Family history of   
depression (V17.0)   
(Z81.8)  
Family history of   
diabetes mellitus   
(V18.0) (Z83.3)  
Family history of   
hypercholesterolemia   
(V18.19) (Z83.42)  
Family history of   
hypertension (V17.49)   
(Z82.49)  
Family history of   
thyroid disease   
(V18.19) (Z83.49)  
Father  
Family history of   
alcoholism (V17.0)   
(Z81.1)  
Family history of   
cerebrovascular   
accident (CVA) (V17.1)   
(Z82.3)  
Family history of   
coronary artery disease   
(V17.3) (Z82.49)  
Family history of   
diabetes mellitus   
(V18.0) (Z83.3)  
Family history of   
hypercholesterolemia   
(V18.19) (Z83.42)  
Family history of TIA   
(transient ischemic   
attack)  
Brother  
Family history of   
hypercholesterolemia   
(V18.19) (Z83.42)  
Maternal Grandmother  
Family history of   
malignant neoplasm of   
brain (V16.8) (Z80.8)  
Maternal Great   
Grandmother  
Family history of   
cardiac disorder   
(V17.49) (Z82.49)  
Family history of   
cerebrovascular   
accident (CVA) (V17.1)   
(Z82.3)  
Family history of   
hypertension (V17.49)   
(Z82.49)  
Paternal Great   
Grandmother  
Family history of   
cardiac disorder   
(V17.49) (Z82.49)  
Family history of   
hypertension (V17.4   
(more content not   
included)...        Normal                                   [facility]  
   
                                                    [facility]  
tmenton 2023   
   
                                                    [facility]   
Pathology   
Department                              Name CASSI DE LA TORRE  
  
Accession #: [id number]  
Pathologist: LOU PETTIT,   
Date of Procedure:   
2023  
Date Received:   
2023  
Date Reported 2023  
Submitting Physician:   
ANTONIETA BAKER M.D.  
Location: [facility]   
External #  
  
FINAL DIAGNOSIS  
A. ENDOMETRIUM, BIOPSY:  
-- MARKEDLY FRAGMENTED   
WEAKLY PROLIFERATIVE   
ENDOMETRIUM WITH TUBAL   
METAPLASIA;  
TOO FRAGMENTED TO   
FURTHER EVALUATE. SEE   
NOTE.  
-- SQUAMOUS AND   
ENDOCERVICAL EPITHELIUM   
WITH NO SIGNIFICANT   
PATHOLOGIC  
FINDINGS.  
Note: Deeper levels   
were examined, focal   
squamous morular   
metaplasia cannot be  
excluded.  
This case was shown at   
the [facility] GYN consensus   
conference via Zoom on   
23.  
Attending: ELLA Peacock R. Redline.  
The gross and/or   
microscopic findings   
were reviewed in   
conjunction with  
Surgical Pathology   
fellow, Dereck Castañeda M.D.  
  
  
  
  
  
  
  
Electronically Signed   
Out By LOU PETTIT DO/NADIRA  
By the signature on   
this report, the   
individual or group   
listed as making the  
Final   
Interpretation/Diagnosi  
s certifies that they   
have reviewed this   
case.  
Diagnostic   
interpretation   
performed at [address] [place] [address]  
  
Clinical History:  
Fixative (A): FORMALIN  
Clinical Diagnosis   
History:   
Post-menopausal   
bleeding - (N95.0)  
Specimens Submitted As:  
A: ENDOMETRIAL BIOPSY  
Gross Description:  
Received in formalin,   
labeled with the   
patient?s name and   
hospital number and  
 EMB , is a scant   
amount of mucus, blood,   
and soft tissue,   
aggregating to 2.0 x  
1.2 x 0.1 cm. The   
specimen is submitted   
in toto in one   
cassette. The specimen  
may not survive   
processing.  
AE  
aey/2023  
[facility]  
Department of Pathology  
[address] [place] [address] Normal                                  [facility]  
   
                                        Comment on above:   Performed By: #### U  
[facility] ####  
[facility]  
[address] [place] [address]   
   
                                                    No Panel Informationon    
   
                                            [facility]  
Work Phone:   
[phone]  
3  
   
                                                    US PELVIS TRANSABDOMINAL WIT  
H TRANSVAGINALon 2023   
   
                                                    US PELVIS   
TRANSABDOMINAL   
WITH TRANSVAGINAL                       MRN: [id number]  
Patient Name:   
CASSI DE LA TORRE  
  
STUDY:  
US PELVIS   
TRANSABDOMINAL WITH   
TRANSVAGINAL; 2023   
7:57 am  
  
INDICATION:  
postmenopausal   
bleeding.  
  
COMPARISON:  
None.  
  
ACCESSION NUMBER(S):  
[id number]  
  
ORDERING CLINICIAN:  
JANIE PARISH  
  
TECHNIQUE:  
Multiple multiplanar   
static gray scale,   
color and spectral   
waveform  
sonographic images of   
the pelvis were   
obtained.   
Transabdominal and  
endovaginal ultrasound   
was performed.   
Transabdominal portion   
is  
limited because the   
bladder is not   
distended  
  
FINDINGS:  
UTERUS:  
The uterus is mildly   
enlarged measuring 9.5   
x 5.1 x 4.4 cm  
There is a 4.3 x 3.9 x   
3.5 cm anterior uterine   
body transmural  
fibroid abutting both   
the serosal and   
endometrial surfaces.  
  
ENDOMETRIUM:  
The endometrial canal   
measures 0.9 cm in   
thickness. In a  
postmenopausal female,   
typically values less   
than 0.5 cm are normal  
in values greater than   
1 cm are abnormal. This   
is an intermediate  
indeterminate   
thickness.  
  
RIGHT ADNEXA:  
The right ovary is not   
visualized.  
  
LEFT ADNEXA:  
The left ovary measures   
4.9 x 4.9 x 2.5 cm  
There is a 2.8 cm   
simple left ovarian   
cyst. According to SR U  
(Society of   
radiologists in   
ultrasound) guidelines,   
no follow-up is  
needed in a   
postmenopausal female   
with this size cyst..  
Color Doppler spectral   
wave imaging shows   
flow.  
  
CUL DE SAC:  
No gross free fluid is   
seen in the pelvic   
cul-de-sac.  
  
IMPRESSION:  
1. Prominent uterus   
with endometrial canal   
thickness of 0.9 cm.  
2. 2.8 cm simple left   
ovarian cyst.  
3. Nonvisualization   
right ovary.  
Electronically signed   
by: BEVERLEY DE GUZMAN MD                  [facility]  
   
                                                    DIGITAL MAMM SCREENING W/ TO  
Juan J 2023   
   
                                                    DIGITAL MAMM   
SCREENING W/ LINDA                       MRN: [id number]  
Patient Name:   
CASSI DE LA TORRE  
  
STUDY:  
Digital mammography   
screening with linda;   
2023 4:09 pm  
  
ACCESSION NUMBER(S):  
[id number]  
  
ORDERING CLINICIAN:  
ANTONIETA BAKER  
  
INDICATION:  
Screening.  
  
COMPARISON:  
Comparison is made to   
prior digital   
mammograms dated   
2022  
  
FINDINGS:  
CC and MLO 2D digital   
mammograms and digital   
breast tomosynthesis  
images were obtained of   
the bilateral breasts.   
3-D volume images  
were reconstructed in   
QQ views at an   
independent workstation   
as 1 mm  
slices through the   
breasts in both the CC   
and MLO projections.  
  
The breast tissue is   
almost entirely fatty.   
No discrete mass or  
focal asymmetry is   
identified. No   
suspicious   
microcalcifications or  
foci of architectural   
distortion are seen.   
There has been no  
significant change.  
  
This study was   
interpreted with CAD.  
  
IMPRESSION:  
No mammographic   
evidence of malignancy.  
  
BI-RADS CATEGORY:  
  
Category: 1 - Negative.  
Recommendation: 1 Year   
Screening.  
Electronically signed   
by: PATEL SALGUERO MD        [facility]  
   
                                                    TSHon 2023   
   
                      TSH Qn     3.85 m[IU]/L Normal     0.44 - 3.98 [facility]  
   
                                        Comment on above:   Result Comment: TSH   
testing is performed using different   
testing  
methodology at [facility] than at other  
[facility]. Direct result comparisons should  
only be made within the same method.   
   
                                                            Performed By: #### T  
SH2 ####  
[address] [address]   
   
                                                    Lab Specimen   
Source                          [facility]  
   
                                        Comment on above:   Performed By: #### T  
SH2 ####  
[address] [address]   
   
                                                    FSH + LHon 2023   
   
                                                    FOLLICLE STIM.   
HORMONE         34.8 IU/L       Normal                          [facility]  
   
                                        Comment on above:   Result Comment: REF   
VALUES  
FOLLICULAR 2-12  
MID-CYCLE 12-25  
LUTEAL PHASE 2-12  
MENOPAUSE   
PREPUBERTY 50% ADULT  
ADULT MALE 2-10  
INFANTS 0-1   
   
                                                            Performed By: #### F  
SHL ####  
[facility]  
[address]  
[place] [address]   
   
                                                    LUTEINIZING   
HORMONE         25.6 IU/L       Normal                          [facility]  
   
                                        Comment on above:   Result Comment: REF   
VALUES  
FOLLICULAR PHASE 1.9-12.5  
MID-CYCLE 8.7-76.3  
LUTEAL PHASE 0.5-16.9  
POST MENOPAUSE 5.0-55.2  
CHILDREN 0- 6.0  
ADULT MALE 18-70 1.5- 9.3  
ADULT MALE >70 3.1-34.6   
   
                                                            Performed By: #### F  
SHL ####  
[facility]  
[address]  
[place] [address]   
   
                                                    Cult, Genitalon 05-   
   
                                                    Bacteria   
identified Aer cx   
Nom (Genital   
specimen)                                                       [facility] [address]   
[place]  
Work Phone:   
[phone]  
3  
   
                                                    GENITAL CULTURE, BACT.on 05-  
   
   
                                                    GENITAL CULTURE,   
BACT.                                   PATIENT: CASSI DE LA TORRE MRN: [id number]   
LOCATION: [address]#: [id number] :   
[birth year] AGE: SEX: F  
  
ORDER#: [id number]   
ORDERED BY: ANTONIETA BAKER  
SOURCE: GENITAL   
COLLECTED: 05/15/23   
10:50  
ANTIBIOTICS AT [facility].:   
RECEIVED : 05/15/23   
18:45  
SITE: VAGINAL  
  
R E S U L T S  
  
GENITAL CULTURE, BACT.   
FINAL 23 09:07  
  
NO PATHOGENS  
Culture examined for   
Group A Streptococcus,  
Group B Streptococcus,   
Neisseria gonorrhoeae  
and Yeast ONLY.     Normal                                  [facility]  
   
                                        Comment on above:   Performed By: #### G  
ENLO ####  
[facility]  
[address]  
[place] [address]   
   
                                                    LMPon 05-   
   
                                                    Last menstrual   
period start date 2023                                       [facility]  
Work Phone:   
[phone]  
3  
   
                                                    Laboratory - Chemistry and C  
hemistry - challengeon 05-   
   
                      Follitropin Qn 34.8 {IU/L}                       [employer]  
Work Phone:   
[phone]  
3  
   
                                        Comment on above:   REF VALUESFOLLICULAR  
 2-12MID-CYCLE 12-25LUTEAL PHASE 2-  
12MENOPAUSE   
30-150PREPUBERTY 50% ADULTADULT MALE 2-10INFANTS 0-1   
   
                      Lutropin Qn 25.6 {IU/L}                       [employer]  
Work Phone:   
[phone]  
3  
   
                                        Comment on above:   REF VALUESFOLLICULAR  
 PHASE 1.9-12.5MID-CYCLE 8.7-76.3LUTEAL   
PHASE   
0.5-16.9POST MENOPAUSE 5.0-55.2CHILDREN 0- 6.0ADULT MALE 18-70 1.5-   
9.3ADULT MALE >70 3.1-34.6   
   
                                                    OB/GYN - Office Visiton    
   
                                                    OB/GYN - Office   
Visit                                   Diagnoses/Problems  
Assessed  
Menopause (627.2)   
(Z78.0)  
Vulvitis (616.10)   
(N76.2)  
Encounter for mammogram   
to establish baseline   
mammogram (V76.12)   
(Z12.31)  
Orders  
Mamm - Screening   
Mammogram w/   
Tomosynthesis;   
Status:Hold For -   
Scheduling;  
Requested   
for:2023;  
Radiologist to   
Determine Optimal Study   
: Y  
What are the patient's   
signs and symptoms ? :   
Annual Screening   
Mammogram  
FSH + LH;   
Status:Active;   
Requested   
for:2023;  
Cult, Genital;   
Status:In Progress -   
Specimen/Data   
Collected; Done:   
2023  
Site : Vaginal  
Start:   
Nystatin-Triamcinolone   
907192-6.1 UNIT/GM-%   
External Cream; APPLY  
SPARINGLY TO AFFECTED   
AREA(S) 3 TIMES A DAY  
Provider Impressions  
Evelyn is a   
53-year-old woman who   
comes in for routine   
GYN exam. Mammogram   
ordered. Pap smear not   
indicated.  
Vulvitis of unclear   
etiology we will treat   
her with Mycolog to  
Suspect patient is   
menopausal we will   
check FSH.  
Patient emotionally   
labile but indicated to   
her that I would not   
treat this with   
hormones offered to add   
a an additional mood   
stabilizer to her   
regiment but patient   
reports she will have   
her PCP manage her   
mood. Follow-up in 1   
year  
  
Chief Complaint  
Patient here today for   
annual exam. She states   
she quit taking her   
birth control about a   
month ago. She is   
concerned with   
depression, she is   
wanted you opinion on   
what would be best for   
her. She also complains   
of itching for about 2   
months externally tried   
over the counter but no   
relief. Last pap   
2022 cotest   
negative. Had mammo   
scheduled today but had   
to reschedule due to   
conflict. LMP 2023  
  
History of Present   
IllnessEvelyn is a   
53-year-old woman who   
comes in for routine   
GYN exam. Patient is   
due for mammogram.   
Patient is not due for   
a Pap smear. Patient   
experiencing   
significant anxiety and   
is very tearful. She   
reports she stopped   
taking her birth   
control pills   
approximately 3 weeks   
ago. Patient also   
reports vulvar itching   
but nothing in the   
vagina.  
  
Review of Systems  
Constitutional: Reports   
increase crying and   
emotionally labile.  
Cardiovascular: Denies   
any chest pain or   
palpitations.  
Respiratory: Denies any   
shortness of breath or   
cough.  
Gastrointestinal:   
Denies any changes in   
bowel habits.  
Genitourinary: Denies   
any problems.  
Musculoskeletal: Denies   
any change in her   
mobility.  
Psychiatric: Reports   
she is more emotionally   
labile.  
  
Active Problems  
Problems  
BMI 39.0-39.9,adult   
(V85.39) (Z68.39)  
Class 2 severe obesity   
due to excess calories   
with serious   
comorbidity and body   
mass  
index (BMI) of 39.0 to   
39.9 in adult   
(278.01,V85.39)   
(E66.01,Z68.39)  
Depression, major,   
single episode, mild   
(296.21) (F32.0)  
DJD (degenerative joint   
disease) (715.90)   
(M19.90)  
Eczema (692.9) (L30.9)  
Elevated alkaline   
phosphatase level   
(790.5) (R74.8)  
Encounter for   
immunization (V03.89)   
(Z23)  
Encounter for screening   
mammogram for breast   
cancer (V76.12)   
(Z12.31)  
Encounter for screening   
mammogram for malignant   
neoplasm of breast   
(V76.12)  
(Z12.31)  
Fatigue (780.79)   
(R53.83)  
History of TMJ disorder   
(V13.59) (Z87.39)  
Hyperglycemia (790.29)   
(R73.9)  
Menopause (627.2)   
(Z78.0)  
Menstrual irregularity   
(626.4) (N92.6)  
Migraine (346.90)   
(G43.909)  
Mixed hyperlipidemia   
(272.2) (E78.2)  
Neck pain, chronic   
(723.1,338.29)   
(M54.2,G89.29)  
Nocturnal hypoxemia   
(327.24) (G47.34)  
Pap smear for cervical   
cancer screening   
(V76.2) (Z12.4)  
Personal history of   
colonic polyps (V12.72)   
(Z86.010)  
Screening for cervical   
cancer (V76.2) (Z12.4)  
Screening for heart   
disease (V81.2) (Z13.6)  
Snoring (786.09)   
(R06.83)  
Taking medication for   
chronic disease (799.9)   
(R69)  
Women's annual routine   
gynecological   
examination (V72.31)   
(Z01.419)  
Past Medical History  
Problems  
History of mammogram   
(V15.89) (Z92.89)  
-  
History of Menstruation  
age 12  
History of Normal   
vaginal delivery (650)   
(O80)  
2008 40 weeks,   
female, 7lbs  
2007 40 weeks,   
female 7 lbs  
History of Pap test, as   
part of routine   
gynecological   
examination (V76.2)   
(Z01.419)  
  
Surgical History  
Problems  
History of Appendectomy  
  
History of Colonoscopy  
2019  
History of Renal   
lithotripsy  
  
History of [place] tooth   
extraction  
Family History  
Mother  
Family history of   
depression (V17.0)   
(Z81.8)  
Family history of   
diabetes mellitus   
(V18.0) (Z83.3)  
Family history of   
hypercholesterolemia   
(V18.19) (Z83.42)  
Family history of   
hypertension (V17.49)   
(Z82.49)  
Family history of   
thyroid disease   
(V18.19) (Z83.49)  
Father  
Family history of   
alcoholism (V17.0)   
(Z81.1)  
Family history of   
cerebrovascular   
accident (CVA) (V17.1)   
(Z82.3)  
Family history of   
coronary artery disease   
(V17.3) (Z82.49)  
Family history of   
diabetes mellitus   
(V18.0) (Z83.3)  
Family history of   
hypercholesterolemia   
(V18.19) (Z83.42)  
Family history of TIA   
(transient ischemic   
attack)  
Brother  
Family history of   
hypercholesterolemia   
(V18.19) (Z83.42)  
Maternal Grandmother  
Family history of   
malignant neoplasm of   
brain (V16.8) (Z80.8)  
Maternal Great   
Grandmother (more   
content not   
included)...        Normal                                   [employer]  
   
                                                    BASIC METABOLIC PANELon 03-3  
   
   
                                                    Anion gap   
[Moles/Vol]     10 mmol/L       Normal          10 - 20         [facility]  
   
                                        Comment on above:   Performed By: #### B  
MP ####  
[address] [address]   
   
                      Calcium [Mass/Vol] 9.2 mg/dL  Normal     8.6 - 10.3 [facility]  
   
                                        Comment on above:   Performed By: #### B  
MP ####  
[address] [address]   
   
                                                    Chloride   
[Moles/Vol]     104 mmol/L      Normal          98 - 107        [facility]  
   
                                        Comment on above:   Performed By: #### B  
MP ####  
[address] [address]   
   
                                                    Creatinine   
[Mass/Vol]      0.87 mg/dL      Normal          0.50 - 1.05     [facility]  
   
                                        Comment on above:   Performed By: #### B  
MP ####  
[address] [address]   
   
                                                    GFR/1.73 sq   
M.predicted among   
non-blacks MDRD   
(S/P/Bld) [Vol   
rate/Area]      79 mL/min/{1.73_m2} Normal          >90             [facility]  
   
                                        Comment on above:   Result Comment: CALC  
ULATIONS OF ESTIMATED GFR ARE PERFORMED  
USING THE  CKD-EPI STUDY REFIT EQUATION  
WITHOUT THE RACE VARIABLE FOR THE IDMS-TRACEABLE  
CREATININE METHODS.  
https://jasn.asnjournals.org/content/early//ASN.399070480  
8   
   
                                                            Performed By: #### B  
MP ####  
[address] [address]   
   
                      Glucose [Mass/Vol] 84 mg/dL   Normal     74 - 99    [facility]  
   
                                        Comment on above:   Performed By: #### B  
MP ####  
[address] [address]   
   
                                                    HCO3 (Bld)   
[Moles/Vol]     27 mmol/L       Normal          21 - 32         [facility]  
   
                                        Comment on above:   Performed By: #### B  
MP ####  
[address] [address]   
   
                                                    Potassium   
[Moles/Vol]     4.2 mmol/L      Normal          3.5 - 5.3       [facility]  
   
                                        Comment on above:   Performed By: #### B  
MP ####  
[address] [address]   
   
                      Sodium [Moles/Vol] 137 mmol/L Normal     136 - 145  [facility]  
   
                                        Comment on above:   Performed By: #### B  
MP ####  
[address] [address]   
   
                                                    Urea nitrogen   
[Mass/Vol]      21 mg/dL        Normal          6 - 23          [facility]  
   
                                        Comment on above:   Performed By: #### B  
MP ####  
[address] [address]   
   
                                                    HEMOGLOBIN A1Con 2023   
   
                      Glucose [Mass/Vol] 111 mg/dL  Normal                [facility]  
   
                                        Comment on above:   Performed By: #### H  
BA1E ####  
[address] [address]   
   
                                                    HbA1c (Bld) [Mass   
fraction]       5.5 %           Normal                          [facility]  
   
                                        Comment on above:   Result Comment: Diag  
nosis of Diabetes-Adults  
Non-Diabetic: < or = 5.6%  
Increased risk for developing diabetes: 5.7-6.4%  
Diagnostic of diabetes: > or = 6.5%  
.  
Monitoring of Diabetes  
Age (y) Therapeutic Goal (%)  
Adults: >18 <7.0  
Pediatrics: 13-18 <7.5  
7-12 <8.0  
0- 6 7.5-8.5  
American Diabetes Association. Diabetes Care 33(S1), 2010.   
   
                                                            Performed By: #### H  
BA1E ####  
[address] [address]   
   
                                                    Office Visiton 10-   
   
                                        Follow-up visit     Diagnoses/Problems  
Mixed hyperlipidemia   
(272.2) (E78.2)  
Migraine (346.90)   
(G43.909)  
History of TMJ disorder   
(V13.59) (Z87.39)  
Neck pain, chronic   
(723.1,338.29)   
(M54.2,G89.29)  
Orders  
Neck pain, chronic  
Xray Cervical Spine 2   
or 3 View; Status:Hold   
For - Scheduling;   
Requested   
for:2022;  
Radiologist to   
Determine Optimal Study   
: Y  
What are the patient's   
signs and symptoms? :   
left sided neck pain  
Patient   
Discussion/Summary  
PT INSTRUCTIONS  
#1. As we discussed I   
am extremely pleased   
with your cholesterol   
and your liver enzymes   
are entirely normal.   
Typically we check the   
cholesterol once a year   
and lets there is a big   
change in medicine or   
lifestyle habits  
#2. We talked about   
your neck pain and I   
have ordered an x-ray.   
We will call you with   
the results. I   
anticipate it will show   
arthritis and my   
recommendation would   
likely be to partake in   
physical therapy.   
Sometimes by treating   
the neck headaches   
actually get better.  
#3. Please discuss your   
TMJ with your dentist   
and you may want to see   
a specialist in TMJ to   
see what their options   
you would have for   
treatment  
#4. Please remember   
that sleep apnea could   
be a contributing   
factor to your   
headaches and whenever   
you decide that you   
would like to pursue   
treatment please let us   
know.  
#5. If everything goes   
according to plan I   
will see you back in 6   
months and please   
remember to get fasting   
lab work just prior to   
that visit.  
#6. Please call us when   
you get your flu   
vaccine  
***********************  
**********************  
She will go for x-rays   
of her cervical spine   
and I have agreed to   
call her with results  
Please schedule a   
6-month follow-up visit   
with 6-month lab to   
include fasting BMP   
with hemoglobin A1c and   
diagnosis of   
hypertension with   
hyperglycemia  
  
  
Chief Complaint  
Pt is here today for a   
3 month check up,   
review labs. This note   
was generated by using   
Dragon software. It may   
contain errors in   
wording, punctuate, or   
spelling.  
She is here today for   
follow-up. She has been   
taking her atorvastatin   
as directed and we went   
over the results of her   
recent lab. I am   
extremely pleased with   
the lowering of her   
cholesterol and her   
liver enzymes came back   
normal. We will have   
her continue the   
medication. Her blood   
pressure is also   
excellent today and   
according to our scales   
she is lost a few   
pounds. We conducted a   
review of systems and   
she is still struggling   
with chronic headaches.   
We are reminded that   
she has had a diagnosis   
of migraine headaches   
and she states that she   
also has some element   
of chronic neck pain   
especially on the left   
side and she has also   
had problems with   
temporomandibular joint   
dysfunction. She states   
she does wear a bite   
guard and she will be   
seeing her dentist in   
the near future. We   
talked about various   
contributing factors to   
headaches and we talked   
about some of its   
treatments. She states   
in the past she was   
placed on 2 medications   
and they caused her to   
feel really groggy. She   
is pretty sure one of   
them was Topamax. We   
talked about doing an   
x-ray of her neck and   
we talked about   
starting physical   
therapy as we do know   
that chronic neck pain   
can be a source of   
headaches. We talked   
about possibly seeing   
an oral surgeon for her   
TMJ or even injections.   
We talked about how   
sleep apnea can cause   
chronic headaches. She   
expresses understanding   
and she would like to   
proceed with the x-ray.   
We will call her with   
the results. She states   
she is also scheduled   
to get this years flu   
vaccine at work this   
month and she will let   
us know when that   
occurs. We talked about   
seeing her back in 6   
months for a general   
checkup and sooner if   
any problems.  
  
Review of SystemsDenies   
fatigue.  
Denies shortness of   
breath, coughing,   
wheezing  
Denies chest pain,   
palpitations, leg edema  
Denies nausea,   
vomiting, diarrhea  
Denies significant   
joint pain . No back   
pain, Some neck pain   
and headaches  
Denies feelings of   
significant anxiety or   
depression  
  
Active Problems  
BMI 39.0-39.9,adult   
(V85.39) (Z68.39)  
Class 2 severe obesity   
due to excess calories   
with serious   
comorbidity and body   
mass  
index (BMI) of 39.0 to   
39.9 in adult   
(278.01,V85.39)   
(E66.01,Z68.39)  
Depression, major,   
single episode, mild   
(296.21) (F32.0)  
Eczema (692.9) (L30.9)  
Elevated alkaline   
phosphatase level   
(790.5) (R74.8)  
Encounter for   
immunization (V03.89)   
(Z23)  
Encounter for screening   
mammogram for breast   
cancer (V76.12)   
(Z12.31)  
Encounter for screening   
mammogram for malignant   
neoplasm of breast   
(V76.12)  
(Z12.31)  
Fatigue (780.79)   
(R53.83)  
History of TMJ disorder   
(V13.59) (Z87.39)  
Hyperglycemia (790.29)   
(R73.9)  
Menopause (627.2)   
(Z78.0)  
Menstrual irregularity   
(626.4) (N92.6)  
Migraine (346.90)   
(G43.909)  
Mixed hyperlipidemia   
(272.2) (E78.2)  
Nocturnal hypoxemia   
(327.24) (G47.34)  
Pap smear for cervical   
cancer screening   
(V76.2) (Z12.4)  
Personal history of   
colonic polyps (V12.72)   
(Z86.010)  
Screening for cervical   
cancer (V76.2) (Z12.4)  
Scre (more content not   
included)...        Normal                                   [employer]  
   
                                                    Radiologyon 10-   
   
                                                    XR Cervical spine   
3 Views                                 Please click on the   
link to view the study   
images              Normal                                  [facility]  
and  
Work Phone:   
[phone]  
   
                                                    XR Cervical spine   
3 Views                         Normal                          [facility]  
and  
Work Phone:   
[phone]  
   
                                                    SPINE, CERVICAL, 2 OR 3 VIEW  
Son 10-   
   
                                                    SPINE, CERVICAL, 2   
OR 3 VIEWS                              MRN: [id number]  
Patient Name:   
CASSI DE LA TORRE  
  
STUDY:  
SPINE, CERVICAL, 2 OR 3   
VIEWS; ; 10/4/2022 7:53   
am  
  
INDICATION:  
left sided neck pain   
M54.2: Neck pain,   
chronic G89.29:.  
  
COMPARISON:  
10/15/2012  
  
ACCESSION NUMBER(S):  
[id number]  
  
ORDERING CLINICIAN:  
JANIE PARISH  
  
FINDINGS:  
Mild degenerative   
changes seen most   
notable at C5-6. If   
concern for  
central canal or   
foraminal stenosis,   
consider MRI. No acute   
osseous  
abnormality.  
  
IMPRESSION:  
Mild degenerative   
changes. If concern for   
central canal or   
foraminal  
stenosis, consider MRI  
  
Electronically signed   
by: PAYTON MABRY MD Normal                                  [facility]  
   
                                                    Tobacco Screening.on 10-04-2  
022   
   
                                                    Tobacco use status   
[facility]            b) No                                           [facility]  
and  
Work Phone:   
[phone]  
   
                                                    [place] 2022   
   
                                                    ALT [Catalytic   
activity/Vol]   13 U/L          Normal          7 - 45          [facility]  
   
                                        Comment on above:   Result Comment: Kasey  
ents treated with Sulfasalazine may   
generate  
falsely decreased results for ALT.   
   
                                                            Performed By: #### A  
LT ####  
[address] [address]   
   
                                                    ALT - Alanine Aminotransfera  
se, Serumon 2022   
   
                                                    ALT With P-5'-P   
[Catalytic   
activity/Vol]   13 U/L                          7 - 45          [facility]  
and  
Work Phone:   
[phone]  
   
                                        Comment on above:   Patients treated wit  
h Sulfasalazine may generate falsely   
decreased   
results for ALT.   
   
                                                    Malik 2022   
   
                                                    AST [Catalytic   
activity/Vol]   12 U/L          Normal          9 - 39          [facility]  
   
                                        Comment on above:   Performed By: #### A  
ST ####  
[address] [place] [address]   
   
                                                    LIPID PANEL (CORONARY RISK 2  
)on 2022   
   
                                                    Cholesterol   
[Mass/Vol]      168 mg/dL       Normal          0 - 199         [facility]  
   
                                        Comment on above:   Result Comment: .  
AGE DESIRABLE BORDERLINE HIGH HIGH  
0-19 Y 0 - 169 170 - 199 >/= 200  
20-24 Y 0 - 189 190 - 224 >/= 225  
>24 Y 0 - 199 200 - 239 >/= 240  
**All ranges are based on fasting samples. Specific  
therapeutic targets will vary based on patient-specific  
cardiac risk.  
.  
Pediatric guidelines reference:Pediatrics 2011, 128(S5).  
Adult guidelines reference: NCEP ATPIII Guidelines,  
KEI 2001, 258:6126-97  
.  
Venipuncture immediately after or during the  
administration of Metamizole may lead to falsely  
low results. Testing should be performed immediately  
prior to Metamizole dosing.   
   
                                                            Performed By: #### L  
IPID ####  
[address] [address]   
   
                                                    Cholesterol in HDL   
[Mass/Vol]      41.0 mg/dL      Normal                          [facility]  
   
                                        Comment on above:   Result Comment: .  
AGE VERY LOW LOW NORMAL HIGH  
0-19 Y < 35 < 40 40-45 ----  
20-24 Y ---- < 40 >45 ----  
>24 Y ---- < 40 40-60 >60  
.   
   
                                                            Performed By: #### L  
IPID ####  
[address] [address]   
   
                                                    Cholesterol in LDL   
[Mass/Vol]      107 mg/dL       High            0 - 99          [facility]  
   
                                        Comment on above:   Result Comment: .  
NEAR BORD  
AGE DESIRABLE OPTIMAL HIGH HIGH VERY HIGH  
0-19 Y 0 - 109 --- 110-129 >/= 130 ----  
20-24 Y 0 - 119 --- 120-159 >/= 160 ----  
>24 Y 0 - 99 100-129 130-159 160-189 >/=190  
.   
   
                                                            Performed By: #### L  
IPID ####  
[address] [address]   
   
                                                    Cholesterol in   
VLDL [Mass/Vol] 20 mg/dL        Normal          0 - 40          [facility]  
   
                                        Comment on above:   Performed By: #### L  
IPID ####  
[address] [address]   
   
                                                    Cholesterol.total/  
Cholesterol in HDL   
[Mass ratio]    4.1 {ratio}     Normal                          [facility]  
   
                                        Comment on above:   Result Comment: REF   
VALUES  
DESIRABLE < 3.4  
HIGH RISK > 5.0   
   
                                                            Performed By: #### L  
IPID ####  
[address] [address]   
   
                                                    Triglyceride   
[Mass/Vol]      98 mg/dL        Normal          0 - 149         [facility]  
   
                                        Comment on above:   Result Comment: .  
AGE DESIRABLE BORDERLINE HIGH HIGH VERY HIGH  
0 D-90 D 19 - 174 ---- ---- ----  
91 D- 9 Y 0 - 74 75 - 99 >/= 100 ----  
10-19 Y 0 - 89 90 - 129 >/= 130 ----  
20-24 Y 0 - 114 115 - 149 >/= 150 ----  
>24 Y 0 - 149 150 - 199 200- 499 >/= 500  
.  
Venipuncture immediately after or during the  
administration of Metamizole may lead to falsely  
low results. Testing should be performed immediately  
prior to Metamizole dosing.   
   
                                                            Performed By: #### L  
IPID ####  
[address] [address]   
   
                                                    Laboratory - Chemistry and C  
hemistry - challengeon 2022   
   
                                                    AST With P-5'-P   
[Catalytic   
activity/Vol]   12 U/L                          9 - 39          [facility]  
and  
Work Phone:   
[phone]  
5  
   
                                                    Lipid Panelon 2022   
   
                                                    Cholesterol   
[Mass/Vol]      168 mg/dL                       0 - 199         [facility]  
and  
Work Phone:   
[phone]  
5  
   
                                        Comment on above:   . AGE DESIRABLE BORD  
LIVAN HIGH HIGH 0-19 Y 0 - 169 170 - 199   
>/=   
200 20-24 Y 0 - 189 190 - 224 >/= 225 >24 Y 0 - 199 200 - 239 >/=   
240 **All ranges are based on fasting samples. Specific therapeutic   
targets will vary based on patient-specific cardiac risk..   
Pediatric guidelines reference:Pediatrics 2011, 128(S5). Adult   
guidelines reference: NCEP ATPIII Guidelines, KEI 2001,   
258:2486-97. Venipuncture immediately after or during the   
administration of Metamizole may lead to falsely low results.   
Testing should be performed immediately prior to Metamizole dosing.   
   
                                                    Cholesterol in HDL   
[Mass/Vol]      41.0 mg/dL                                      [facility]  
[employer] Phone:   
[phone]  
   
                                        Comment on above:   . AGE VERY LOW LOW N  
ORMAL HIGH 0-19 Y < 35 < 40 40-45 ---- 20-  
24 Y   
---- < 40 >45 ---- >24 Y ---- < 40 40-60 >60.   
   
                                                    Cholesterol in LDL   
[Mass/Vol]                107 mg/dL                 above high   
threshold                 0 - 99                    [facility]  
[employer]  
Work Phone:   
[phone]  
   
                                        Comment on above:   . NEAR BORD AGE RATNA  
RABLE OPTIMAL HIGH HIGH VERY HIGH 0-19 Y 0  
 -   
109 --- 110-129 >/= 130 ---- 20-24 Y 0 - 119 --- 120-159 >/= 160   
---- >24 Y 0 - 99 100-129 130-159 160-189 >/=190.   
   
                                                    Cholesterol.total/  
Cholesterol in HDL   
[Mass ratio]    4.1 {ratio}                                     [facility]  
[employer]  
Work Phone:   
[phone]  
   
                                        Comment on above:   REF VALUESDESIRABLE   
< 3.4HIGH RISK > 5.0   
   
                                                    Triglyceride   
[Mass/Vol]      98 mg/dL                        0 - 149         [facility]  
[employer]  
Work Phone:   
[phone]  
   
                                        Comment on above:   . AGE DESIRABLE BORD  
LIVAN HIGH HIGH VERY HIGH 0 D-90 D 19 -   
174   
---- ---- ----91 D- 9 Y 0 - 74 75 - 99 >/= 100 ---- 10-19 Y 0 - 89   
90 - 129 >/= 130 ---- 20-24 Y 0 - 114 115 - 149 >/= 150 ---- >24 Y   
0 - 149 150 - 199 200- 499 >/= 500. Venipuncture immediately after   
or during the administration of Metamizole may lead to falsely low   
results. Testing should be performed immediately prior to   
Metamizole dosing.   
   
                      Lipid Panel 20 mg/dL              0 - 40     [facility]  
[employer]  
Work Phone:   
[phone]  
   
                                                    Tobacco Screening.on   
022   
   
                                                    Fall risk   
assessment                              b) One or more falls in   
the last year                                               [facility]  
and  
Work Phone:   
[phone]  
   
                                                    Tobacco use status   
[facility]            b) No                                           [facility]  
and  
Work Phone:   
[phone]  
   
                                                    Lipid Panelon 2022   
   
                                                    Cholesterol   
[Mass/Vol]                261 mg/dL                 above high   
threshold                 0 - 199                   [facility]  
and  
Work Phone:   
[phone]  
   
                                        Comment on above:   . AGE DESIRABLE BORD  
LIVAN HIGH HIGH 0-19 Y 0 - 169 170 - 199   
>/=   
200 20-24 Y 0 - 189 190 - 224 >/= 225 >24 Y 0 - 199 200 - 239 >/=   
240 **All ranges are based on fasting samples. Specific therapeutic   
targets will vary based on patient-specific cardiac risk..   
Pediatric guidelines reference:Pediatrics 2011, 128(S5). Adult   
guidelines reference: NCEP ATPIII Guidelines, KEI 2001,   
258:2486-97. Venipuncture immediately after or during the   
administration of Metamizole may lead to falsely low results.   
Testing should be performed immediately prior to Metamizole dosing.   
   
                                                    Cholesterol in HDL   
[Mass/Vol]      48.0 mg/dL                                      [facility]  
[employer] Phone:   
[phone]  
   
                                        Comment on above:   . AGE VERY LOW LOW N  
ORMAL HIGH 0-19 Y < 35 < 40 40-45 ---- 20-  
24 Y   
---- < 40 >45 ---- >24 Y ---- < 40 40-60 >60.   
   
                                                    Cholesterol in LDL   
[Mass/Vol]                173 mg/dL                 above high   
threshold                 0 - 99                    [facility]  
and  
Work Phone:   
[phone]  
   
                                        Comment on above:   . NEAR BORD AGE RATNA  
RABLE OPTIMAL HIGH HIGH VERY HIGH 0-19 Y 0  
 -   
109 --- 110-129 >/= 130 ---- 20-24 Y 0 - 119 --- 120-159 >/= 160   
---- >24 Y 0 - 99 100-129 130-159 160-189 >/=190.   
   
                                                    Cholesterol non   
HDL [Mass/Vol]  213 mg/dL                                       [facility]  
and  
Work Phone:   
[phone]  
   
                                        Comment on above:   AGE DESIRABLE BORDER  
LINE HIGH HIGH VERY HIGH 0-19 Y 0 - 119   
120 -   
144 >/= 145 >/= 160 20-24 Y 0 - 149 150 - 189 >/= 190 ---- >24 Y 30   
MG/DL ABOVE LDL CHOLESTEROL GOAL.   
   
                                                    Cholesterol.total/  
Cholesterol in HDL   
[Mass ratio]    5.4 {ratio}     Abnormal                        [facility]  
and  
Work Phone:   
[phone]  
   
                                        Comment on above:   REF VALUESDESIRABLE   
< 3.4HIGH RISK > 5.0   
   
                                                    Triglyceride   
[Mass/Vol]                202 mg/dL                 above high   
threshold                 0 - 149                   [facility]  
[employer]  
Work Phone:   
[phone]  
   
                                        Comment on above:   . AGE DESIRABLE BORD  
LIVAN HIGH HIGH VERY HIGH 0 D-90 D 19 -   
174   
---- ---- ----91 D- 9 Y 0 - 74 75 - 99 >/= 100 ---- 10-19 Y 0 - 89   
90 - 129 >/= 130 ---- 20-24 Y 0 - 114 115 - 149 >/= 150 ---- >24 Y   
0 - 149 150 - 199 200- 499 >/= 500. Venipuncture immediately after   
or during the administration of Metamizole may lead to falsely low   
results. Testing should be performed immediately prior to   
Metamizole dosing.   
   
                      Lipid Panel 40 mg/dL              0 - 40     [facility]  
and  
Work Phone:   
[phone]  
   
                                                    Complete Blood Count + Diffe  
rentialon 2022   
   
                                                    Basophils/100 WBC   
(Bld)           0.3 %                           0.0 - 2.0       [facility]  
and  
Work Phone:   
[phone]  
   
                                                    Erythrocyte   
distribution width   
(RBC) [Ratio]   14.0 %                          See Below       [facility]  
and  
Work Phone:   
[phone]  
   
                                        Comment on above:   Reference Range: 11.  
5 - 14.5   
   
                                                    Hematocrit (Bld)   
[Volume fraction] 42.1 %                          See Below       [facility]  
and  
Work Phone:   
[phone]  
   
                                        Comment on above:   Reference Range: 36.  
0 - 46.0   
   
                                                    Hemoglobin (Bld)   
[Mass/Vol]      13.8 g/dL                       See Below       [facility]  
and  
Work Phone:   
[phone]  
   
                                        Comment on above:   Reference Range: 12.  
0 - 16.0   
   
                                                    Lymphocytes/100   
WBC (Bld)       21.2 %                          See Below       [facility]  
and  
Work Phone:   
[phone]  
   
                                        Comment on above:   Reference Range: 13.  
0 - 44.0   
   
                                                    MCHC (RBC)   
[Mass/Vol]      32.7 g/dL                       See Below       [facility]  
and  
Work Phone:   
[phone]  
   
                                        Comment on above:   Reference Range: 32.  
0 - 36.0   
   
                                                    MCV (RBC) [Entitic   
vol]            83 fL                           80 - 100        [facility]  
and  
Work Phone:   
[phone]  
   
                                                    Monocytes/100 WBC   
(Bld)           8.0 %                           2.0 - 10.0      [facility]  
and  
Work Phone:   
[phone]  
   
                                                    Neutrophils/100   
WBC (Bld)       69.8 %                          See Below       [facility]  
and  
Work Phone:   
[phone]  
   
                                        Comment on above:   Reference Range: 40.  
0 - 80.0   
   
                                                    Platelets (Bld)   
[#/Vol]         357 10*3/uL                     150 - 450       [facility]  
and  
Work Phone:   
[phone]  
   
                      RBC (Bld) [#/Vol] 5.07 {x10E12/L}            See Below  [facility]  
and  
Work Phone:   
[phone]  
   
                                        Comment on above:   Reference Range: 4.0  
0 - 5.20   
   
                      WBC (Bld) [#/Vol] 6.4 10*3/uL            4.4 - 11.3 [facility]  
and  
Work Phone:   
[phone]  
   
                                                    Complete Blood   
Count +   
Differential    0.00 {x10E9/L}                  See Below       [facility]  
and  
Work Phone:   
[phone]  
   
                                        Comment on above:   Reference Range: 0.0  
0 - 0.10   
   
                                                            Reference Range: 0.0  
0 - 0.70   
   
                                                    Complete Blood   
Count +   
Differential    0.50 {x10E9/L}                  See Below       [facility]  
and  
Work Phone:   
[phone]  
   
                                        Comment on above:   Reference Range: 0.1  
0 - 1.00   
   
                                                    Complete Blood   
Count +   
Differential    1.40 {x10E9/L}                  See Below       [facility]  
and  
Work Phone:   
[phone]  
   
                                        Comment on above:   Reference Range: 1.2  
0 - 4.80   
   
                                                    Complete Blood   
Count +   
Differential    4.50 {x10E9/L}                  See Below       [facility]  
and  
Work Phone:   
[phone]  
   
                                        Comment on above:   Reference Range: 1.2  
0 - 7.70 Percent differential counts (%)   
should   
be interpreted in the context of the absolute cell counts   
(cells/L).   
   
                                                    Complete Blood   
Count +   
Differential    0.7 %                           0.0 - 6.0       [facility]  
and  
Work Phone:   
[phone]  
   
                                                    Complete Blood   
Count +   
Differential    0.3 {/100_WBC}                                  [facility]  
and  
Work Phone:   
[phone]  
   
                                                    Hemoglobin A1Con 2022   
   
                      Glucose [Mass/Vol] 114 mg/dL                        [facility]  
and  
Work Phone:   
[phone]  
   
                                                    HbA1c (Bld) [Mass   
fraction]       5.6 %                                           [facility]  
and  
Work Phone:   
[phone]  
   
                                        Comment on above:   Diagnosis of Diabete  
s-Adults Non-Diabetic: < or = 5.6%   
Increased   
risk for developing diabetes: 5.7-6.4% Diagnostic of diabetes: > or   
= 6.5%. Monitoring of Diabetes Age (y) Therapeutic Goal (%) Adults:   
>18 <7.0 Pediatrics: 13-18 <7.5 7-12 <8.0 0- 6 7.5-8.5 American   
Diabetes Association. Diabetes Care 33(S1), 2010.   
   
                                                    Hepatic Function Panelon    
   
                                                    Albumin BCP dye   
[Mass/Vol]      3.9 g/dL                        3.4 - 5.0       [facility]  
and  
Work Phone:   
[phone]  
   
                                                    ALP [Catalytic   
activity/Vol]   99 U/L                          33 - 110        [facility]  
and  
Work Phone:   
[phone]  
   
                                                    ALT With P-5'-P   
[Catalytic   
activity/Vol]   23 U/L                          7 - 45          [facility]  
and  
Work Phone:   
[phone]  
   
                                        Comment on above:   Patients treated wit  
h Sulfasalazine may generate falsely   
decreased   
results for ALT.   
   
                                                    AST With P-5'-P   
[Catalytic   
activity/Vol]   19 U/L                          9 - 39          [facility]  
and  
Work Phone:   
[phone]  
   
                                                    Bilirubin   
[Mass/Vol]      0.4 mg/dL                       0.0 - 1.2       [facility]  
and  
Work Phone:   
[phone]  
   
                                                    Bilirubin.direct   
[Mass/Vol]      0.0 mg/dL                       0.0 - 0.3       [facility]  
and  
Work Phone:   
[phone]  
   
                      Protein [Mass/Vol] 7.0 g/dL              6.4 - 8.2  [facility]  
and  
Work Phone:   
[phone]  
   
                                                    Laboratory - Chemistry and C  
hemistry - challengeon 2022   
   
                                                    Anion gap   
[Moles/Vol]               9 mmol/L                  below low   
threshold                 10 - 20                   [facility]  
and  
Work Phone:   
[phone]  
   
                      Calcium [Mass/Vol] 9.1 mg/dL             8.6 - 10.3 [facility]  
and  
Work Phone:   
[phone]  
   
                                                    Chloride   
[Moles/Vol]     107 mmol/L                      98 - 107        [facility]  
and  
Work Phone:   
[phone]  
   
                      CO2 [Moles/Vol] 26 mmol/L             21 - 32    [facility]  
and  
Work Phone:   
[phone]  
   
                                                    Creatinine   
[Mass/Vol]      0.83 mg/dL                      See Below       [facility]  
and  
Work Phone:   
[phone]  
   
                                        Comment on above:   Reference Range: 0.5  
0 - 1.05   
   
                      Glucose [Mass/Vol] 97 mg/dL              74 - 99    [facility]  
and  
Work Phone:   
[phone]  
   
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   
[phone]                          [facility]  
   
                                                    2024   
07:                              Systolic blood   
pressure                  136 mm[Hg]                Janie Royal DO  
Work Phone:   
[phone]                          [facility]  
   
                                                    2023   
14:          Body height         160.02 cm           Janie S Lebanon  
Work Phone:   
[phone]                          [facility]  
Work Phone:   
[phone]  
   
                                                    2023   
14:                              Body mass index (BMI)   
[Ratio]                   42.51 kg/m2               Janie S Lebanon  
Work Phone:   
[phone]                          [facility]  
Work Phone:   
[phone]  
   
                                                    2023   
14:                              Body surface area   
Derived from formula      2.09 m2                   Janie S Lebanon  
Work Phone:   
[phone]                          [facility]  
Work Phone:   
[phone]  
   
                                                    2023   
14:          Body weight         108.86 kg           Janie S Lebanon  
Work Phone:   
[phone]                          [facility]  
Work Phone:   
[phone]  
   
                                                    2023   
14:                              Diastolic blood   
pressure                  76 mm[Hg]                 Janie S Lebanon  
Work Phone:   
[phone]                          [facility]  
Work Phone:   
[phone]  
   
                                                    2023   
14:                              Systolic blood   
pressure                  134 mm[Hg]                Janie S Lebanon  
Work Phone:   
[phone]                          [facility]  
Work Phone:   
[phone]  
   
                                                    2023   
13:          Body height         160.02 cm           Janie S Lebanon  
Work Phone:   
[phone]                          [facility]  
Work Phone:   
[phone]  
   
                                                    2023   
13:                              Body mass index (BMI)   
[Ratio]                   42.2 kg/m2                Janie S Lebanon  
Work Phone:   
[phone]                          [facility]  
Work Phone:   
[phone]  
   
                                                    2023   
13:                              Body surface area   
Derived from formula      2.08 m2                   Janie S Lebanon  
Work Phone:   
[phone]                          [facility]  
Work Phone:   
[phone]  
   
                                                    2023   
13:          Body weight         108.07 kg           Janie S Lebanon  
Work Phone:   
[phone]                          [facility]  
Work Phone:   
[phone]  
   
                                                    2023   
13:                              Diastolic blood   
pressure                  90 mm[Hg]                 Janie S Lebanon  
Work Phone:   
[phone]                          [facility]  
Work Phone:   
[phone]  
   
                                                    2023   
13:                              Systolic blood   
pressure                  144 mm[Hg]                Janie S Lebanon  
Work Phone:   
[phone]                          [facility]  
Work Phone:   
[phone]  
   
                                                    2023   
08:          Body height         160 cm              Janie Royal DO  
Work Phone:   
[phone]                          [facility]  
   
                                                    2023   
08:                              Body mass index (BMI)   
[Ratio]                   42.16 kg/m2               Janie Royal DO  
Work Phone:   
[phone]                          [facility]  
   
                                                    2023   
08:          Body weight         107.96 kg           Janie Royal DO  
Work Phone:   
[phone]                          [facility]  
   
                                                    2023   
08:                              Diastolic blood   
pressure                  72 mm[Hg]                 Janie Royal DO  
Work Phone:   
[phone]                          [facility]  
   
                                                    2023   
08:          Heart rate          74 /min             Janie Royal DO  
Work Phone:   
[phone]                          [facility]  
   
                                                    2023   
08:                              Systolic blood   
pressure                  124 mm[Hg]                Janie Royal DO  
Work Phone:   
[phone]                          [facility]  
   
                                                    2023   
09:          Body height         160 cm              Janie Royal DO  
Work Phone:   
[phone]                          [facility]  
   
                                                    2023   
09:                              Body mass index (BMI)   
[Ratio]                   40.92 kg/m2               Janie Royal DO  
Work Phone:   
[phone]                          [facility]  
   
                                                    2023   
09:          Body weight         104.78 kg           Janie Royal DO  
Work Phone:   
[phone]                          [facility]  
   
                                                    2023   
09:                              Diastolic blood   
pressure                  80 mm[Hg]                 Janie Royal DO  
Work Phone:   
[phone]                          [facility]  
   
                                                    2023   
09:          Heart rate          100 /min            Janie Royal DO  
Work Phone:   
[phone]                          [facility]  
   
                                                    2023   
09:                              SaO2% (BldA) [Mass   
fraction]                 98 %                      Janie Royal DO  
Work Phone:   
[phone]                          [facility]  
   
                                                    2023   
09:                              Systolic blood   
pressure                  126 mm[Hg]                Janie Royal DO  
Work Phone:   
[phone]                          [facility]  
   
                                                    05-   
10:          Body height         160.02 cm           Janie S Lebanon  
Work Phone:   
[phone]                          [employer]  
Work Phone:   
[phone]  
   
                                                    05-   
10:                              Body mass index (BMI)   
[Ratio]                   40.83 kg/m2               Janie S Lebanon  
Work Phone:   
[phone]                          [facility]   
[address]  
Work Phone:   
[phone]  
   
                                                    05-   
10:                              Body surface area   
Derived from formula      2.05 m2                   Janie S Lebanon  
Work Phone:   
[phone]                          [place] [address] [place]  
Work Phone:   
[phone]  
   
                                                    05-   
10:          Body weight         104.55 kg           Janie S Lebanon  
Work Phone:   
[phone]                          [place] [address] [place]  
Work Phone:   
[phone]  
   
                                                    05-   
10:                              Diastolic blood   
pressure                  82 mm[Hg]                 Janie S Lebanon  
Work Phone:   
[phone]                          [place] [address] [place]  
Work Phone:   
[phone]  
   
                                                    05-   
10:                              Systolic blood   
pressure                  126 mm[Hg]                Janie S Lebanon  
Work Phone:   
[phone]                          [place] [address] [place]  
Work Phone:   
[phone]  
   
                                                    2023   
07:          Body height         160 cm              Janie Royal DO  
Work Phone:   
[phone]                          [facility]  
   
                                                    2023   
07:                              Body mass index (BMI)   
[Ratio]                   40.92 kg/m2               Janie Royal DO  
Work Phone:   
[phone]                          [facility]  
   
                                                    2023   
07:          Body weight         104.78 kg           Janie Royal DO  
Work Phone:   
[phone]                          [facility]  
   
                                                    2023   
07:                              Diastolic blood   
pressure                  72 mm[Hg]                 Janie Royal DO  
Work Phone:   
[phone]                          [facility]  
   
                                                    2023   
07:          Heart rate          84 /min             Janie Royal DO  
Work Phone:   
[phone]                          [facility]  
   
                                                    2023   
07:                              SaO2% (BldA) [Mass   
fraction]                 97 %                      Janie Royal DO  
Work Phone:   
[phone]                          [facility]  
   
                                                    2023   
07:                              Systolic blood   
pressure                  136 mm[Hg]                Janie Royal DO  
Work Phone:   
[phone]                          [facility]  
   
                                                    10-   
07:          Body height         160.02 cm           Janie Parish  
Work Phone:   
[phone]                          [facility]  
Work Phone:   
[phone]  
   
                                                    10-   
07:                              Body mass index (BMI)   
[Ratio]                   39.74 kg/m2               Janie Parish  
Work Phone:   
[phone]                          [facility]  
Work Phone:   
[phone]  
   
                                                    10-   
07:                              Body surface area   
Derived from formula      2.03 m2                   Janie JON Lebanon  
Work Phone:   
[phone]                          [facility]  
Work Phone:   
[phone]  
   
                                                    10-   
07:          Body weight         101.75 kg           Janie Parish  
Work Phone:   
[phone]                          [facility]  
Work Phone:   
[phone]  
   
                                                    10-   
07:                              Diastolic blood   
pressure                  76 mm[Hg]                 Janie Parish  
Work Phone:   
[phone]                          [facility]  
Work Phone:   
[phone]  
   
                                                    10-   
07:          Heart rate          75 /min             Janie Parish  
Work Phone:   
[phone]                          [facility]  
Work Phone:   
[phone]  
   
                                                    10-   
07:                              SaO2% (BldA) [Mass   
fraction]                 98 %                      Janie Parish  
Work Phone:   
[phone]                          [facility]  
Work Phone:   
[phone]  
   
                                                    10-   
07:                              Systolic blood   
pressure                  122 mm[Hg]                Janie JON Lebanon  
Work Phone:   
[phone]                          [facility]  
Work Phone:   
[phone]  
   
                                                    2022   
07:          Body height         160.02 cm           Janie Parish  
Work Phone:   
[phone]                          [facility]  
Work Phone:   
[phone]  
   
                                                    2022   
07:                              Body mass index (BMI)   
[Ratio]                   40.59 kg/m2               Janie Parish  
[employer] Phone:   
[phone]                          [facility]   
[employer]   
[facility]  
Work Phone:   
[phone]  
   
                                                    2022   
07:                              Body surface area   
Derived from formula      2.05 m2                   Janie Parish  
[employer] Phone:   
[phone]                          [facility]  
Work Phone:   
[phone]  
   
                                                    2022   
07:          Body weight         103.93 kg           Janie Parish  
[employer] Phone:   
[phone]                          [facility]  
Work Phone:   
[phone]  
   
                                                    2022   
07:                              Diastolic blood   
pressure                  78 mm[Hg]                 Janie Parish  
[employer] Phone:   
[phone]                          [facility]  
Work Phone:   
[phone]  
   
                                                    2022   
07:          Heart rate          79 /min             Janie Parish  
[employer] Phone:   
[phone]                          [facility]   
[employer]   
[facility]  
Work Phone:   
[phone]  
   
                                                    2022   
07:                              SaO2% (BldA) [Mass   
fraction]                 97 %                      Janie Parish  
[employer] Phone:   
[phone]                          [facility]   
[employer]   
[facility]  
Work Phone:   
[phone]  
   
                                                    2022   
07:                              Systolic blood   
pressure                  132 mm[Hg]                Janie Parish  
[employer] Phone:   
[phone]                          [facility]   
[employer]   
[facility]  
Work Phone:   
[phone]  
   
                                                    2022   
07:          Body height         161.29 cm           Janie Parish  
[employer] Phone:   
[phone]                          [facility]   
[employer]   
[facility]  
Work Phone:   
[phone]  
   
                                                    2022   
07:                              Body mass index (BMI)   
[Ratio]                   39.83 kg/m2               Janie Parish  
Work Phone:   
[phone]                          [facility]  
Work Phone:   
[phone]  
   
                                                    2022   
07:                              Body surface area   
Derived from formula      2.06 m2                   Janie Parish  
Work Phone:   
[phone]                          [facility]  
Work Phone:   
[phone]  
   
                                                    2022   
07:          Body temperature    96.8 [degF]         Janie Parish  
Work Phone:   
[phone]                          [facility]  
Work Phone:   
[phone]  
   
                                                    2022   
07:          Body weight         103.62 kg           Janie Parish  
Work Phone:   
[phone]                          [facility]  
Work Phone:   
[phone]  
   
                                                    2022   
07:                              Diastolic blood   
pressure                  80 mm[Hg]                 Janie Parish  
Work Phone:   
[phone]                          [facility]  
Work Phone:   
[phone]  
   
                                                    2022   
07:          Heart rate          74 /min             Janie Parish  
[employer] Phone:   
[phone]                          [facility]  
Work Phone:   
[phone]  
   
                                                    2022   
07:                              SaO2% (BldA) [Mass   
fraction]                 97 %                      Janie Parish  
Work Phone:   
[phone]                          [facility]  
Work Phone:   
[phone]  
   
                                                    2022   
07:                              Systolic blood   
pressure                  128 mm[Hg]                Janie Parish  
Work Phone:   
[phone]                          [facility]  
Work Phone:   
[phone]  
   
                                                    2022   
11:          Body height         161.29 cm           Janie Parish  
[employer] Phone:   
[phone]                          [facility]  
Work Phone:   
[phone]  
   
                                                    2022   
11:                              Body mass index (BMI)   
[Ratio]                   40.1 kg/m2                Janie JON Lebanon  
Work Phone:   
[phone]                          [facility]  
Work Phone:   
[phone]  
   
                                                    2022   
11:                              Body surface area   
Derived from formula      2.06 m2                   Janie JON Lebanon  
Work Phone:   
[phone]                          [facility]   
[employer]   
[facility]  
Work Phone:   
[phone]  
   
                                                    2022   
11:          Body temperature    96.8 [degF]         Janie JON Lebanon  
Work Phone:   
[phone]                          [facility]   
[employer]   
[facility]  
Work Phone:   
[phone]  
   
                                                    2022   
11:          Body weight         104.33 kg           Janie Parish  
Work Phone:   
[phone]                          [facility]   
[employer]   
[facility]  
Work Phone:   
[phone]  
   
                                                    2022   
11:                              Diastolic blood   
pressure                  84 mm[Hg]                 Janie JON Lebanon  
Work Phone:   
[phone]                          [employer]   
[employer]   
[facility]  
Work Phone:   
[phone]  
   
                                                    2022   
11:                              Systolic blood   
pressure                  122 mm[Hg]                Janie Parish  
Work Phone:   
[phone]                          [facility]   
[facility]  
Work Phone:   
[phone]  
   
                                                    2021   
16:          Body height         161.29 cm           Janie JON Lebanon  
Work Phone:   
[phone]                          [facility]   
[facility]  
Work Phone:   
[phone]  
   
                                                    2021   
16:                              Body mass index (BMI)   
[Ratio]                   40.34 kg/m2               Janie JON Lebanon  
Work Phone:   
[phone]                          [facility]   
[facility]  
Work Phone:   
[phone]  
   
                                                    2021   
16:                              Body surface area   
Derived from formula      2.07 m2                   Janie JON Lebanon  
Work Phone:   
[phone]                          [facility]  
Work Phone:   
[phone]  
   
                                                    2021   
16:          Body temperature    97.1 [degF]         Janie Parish  
Work Phone:   
[phone]                          [facility]  
Work Phone:   
[phone]  
   
                                                    2021   
16:          Body weight         104.95 kg           Janie Parish  
Work Phone:   
[phone]                          [facility]  
Work Phone:   
[phone]  
   
                                                    2021   
16:                              Diastolic blood   
pressure                  76 mm[Hg]                 Janie Parish  
Work Phone:   
[phone]                          [facility]  
Work Phone:   
[phone]  
   
                                                    2021   
16:          Heart rate          79 /min             Janie Parish  
Work Phone:   
[phone]                          [facility]  
Work Phone:   
[phone]  
   
                                                    2021   
16:                              SaO2% (BldA) [Mass   
fraction]                 98 %                      Janie Parish  
Work Phone:   
[phone]                          [facility]  
Work Phone:   
[phone]  
   
                                                    2021   
16:                              Systolic blood   
pressure                  118 mm[Hg]                Janie Parish  
Work Phone:   
[phone]                          [facility]  
Work Phone:   
[phone]  
   
                                                    2021   
16:          Body height         161.29 cm           July Alfredo  
Work Phone:   
[phone]                          [facility]  
Work Phone:   
[phone]  
   
                                                    2021   
16:                              Body mass index (BMI)   
[Ratio]                   40.28 kg/m2               July Alfredo  
Work Phone:   
[phone]                          [facility]  
Work Phone:   
[phone]  
   
                                                    2021   
16:                              Body surface area   
Derived from formula      2.07 m2                   July Alfredo  
Work Phone:   
[phone]                          [facility]  
Work Phone:   
[phone]  
   
                                                    2021   
16:          Body weight         104.78 kg           July Alfredo  
Work Phone:   
[phone]                          [facility]  
Work Phone:   
[phone]  
   
                                                    2021   
16:                              Diastolic blood   
pressure                  82 mm[Hg]                 July Alfredo  
Work Phone:   
[phone]                          [facility]  
Work Phone:   
[phone]  
   
                                                    2021   
16:          Heart rate          80 /min             July Alfredo  
Work Phone:   
[phone]                          [facility]  
Work Phone:   
[phone]  
   
                                                    2021   
16:                              Systolic blood   
pressure                  128 mm[Hg]                July Alfredo  
Work Phone:   
[phone]                          [facility]  
Work Phone:   
[phone]  
  
  
  
Encounters  
  
  
                          Encounter Date Encounter Type Care Provider Facility  
   
                                                    Start: 2025  
End: 01-           ambulatory                Devon Ruiz MD  
Work Phone:   
[phone]                          OB/Gynecology  
   
                                        Comment on above:   FML Paperwork   
   
                                                    Start: 01-  
End: 01-           Telephone encounter       Devon Ruiz MD  
Work Phone:   
[phone]                          OB/Gynecology  
   
                                        Comment on above:   Heavy Bleeding   
   
                                                    Start: 2025  
End: 2025     ambulatory          DEVON RUIZ     Facility:[facility]  
   
                                                    Start: 2025  
End: 2025                         Patient encounter   
procedure                               Devon Ruiz MD  
Work Phone:   
[phone]                          OB/Gynecology  
   
                                        Comment on above:   Abnormal uterine ble  
eding (Primary Dx)   
   
                                                    Start: 2025  
End: 2025           Telephone encounter       Houston Freeman APRN.CNP  
Work Phone:   
[phone]                          OB/Gynecology  
   
                                        Comment on above:   Patient Update   
   
                                                    Start: 2025  
End: 2025           Telephone encounter       Devon Ruiz MD  
Work Phone:   
[phone]                          OB/Gynecology  
   
                                        Comment on above:   Vaginal Bleeding   
   
                                                    Start: 2025  
End: 2025     ambulatory          HOUSTONCARLA RECIONOMAN         Facility:[facility]  
   
                                                    Start: 2025  
End: 2025                         Patient encounter   
procedure                               Houston Freeman APRN.CNP  
Work Phone:   
[phone]                          OB/Gynecology  
   
                                        Comment on above:   Abnormal uterine ble  
eding (Primary Dx)   
   
                                                    Start: 2024  
End: 2024                         Patient encounter   
procedure                               Kevyn REN Alfonso   
APRN-CNP  
Work Phone:   
[phone]                          [facility]  
   
                                        Comment on above:   Acute bronchitis, un  
specified organism (Primary Dx)   
   
                                                    Start: 2024  
End: 2024     ambulatory          [facility]  
   
                                                    Start: 10-  
End: 10-           ambulatory                Devon Ruiz MD  
Work Phone:   
[phone]                          OB/Gynecology  
   
                                        Comment on above:   Pathology   
   
                                                    Start: 10-  
End: 10-                         E-mail encounter from   
caregiver                               Devon Ruiz MD  
Work Phone:   
[phone]                          OB/Gynecology  
   
                                                    Start: 10-  
End: 10-           ambulatory                Devon Ruiz MD  
Work Phone:   
[phone]                          OB/Gynecology  
   
                                        Comment on above:   Endometrial thickeni  
ng on ultrasound (Primary Dx);  
Abnormal uterine bleeding (AUB)   
   
                                                    Start: 10-  
End: 10-                         Patient encounter   
procedure                               Devon Ruiz MD  
Work Phone:   
[phone]                          OB/Gynecology  
   
                                                    Start: 10-  
End: 10-           Telephone encounter       Devon Ruiz MD  
Work Phone:   
[phone]                          OB/Gynecology  
   
                                        Comment on above:   Surgery Orders   
   
                                                    Start: 10-  
End: 10-           ambulatory                Devon Ruiz MD  
Work Phone:   
[phone]                          OB/Gynecology  
   
                                        Comment on above:   PMB (postmenopausal   
bleeding) (Primary Dx);  
Endometrial thickening on ultrasound   
   
                                                    Start: 10-  
End: 10-                         Telemedicine consultation   
with patient                            Devon Ruiz MD  
Work Phone:   
[phone]                          OB/Gynecology  
   
                                                    Start: 10-  
End: 10-           Telephone encounter       Devon Ruiz MD  
Work Phone:   
[phone]                          OB/Gynecology  
   
                                                    Start: 10-  
End: 10-     ambulatory          [facility]        OB/Gynecology  
   
                                        Comment on above:   PAP TEST RESULTS   
   
                                                    Start: 10-  
End: 10-                         E-mail encounter from   
caregiver                 [facility]              OB/Gynecology  
   
                                                    Start: 10-  
End: 10-     ambulatory          DEVON RUIZ     OB/Gynecology  
   
                                                    Start: 10-  
End: 10-                         Patient encounter   
procedure                               [facility]   
[facility]                                OB/Gynecology  
   
                                                    Start: 2024  
End: 2024     ambulatory          DEVON RUIZ     Facility:[facility]  
   
                                                    Start: 2024  
End: 2024                         Patient encounter   
procedure                               Devon Ruiz MD  
Work Phone:   
[phone]                          OB/Gynecology  
   
                                        Comment on above:   Encounter for gyneco  
logical examination (general) (routine)   
without   
abnormal findings (Primary Dx);  
Encounter for screening for malignant neoplasm of cervix;  
Special screening examination for human papillomavirus (HPV);  
PMB (postmenopausal bleeding);  
Intramural leiomyoma of uterus   
   
                                                    Start: 2024  
End: 2024           Patient encounter status  Devon Ruiz MD  
Work Phone:   
[phone]                          [facility]  
   
                                                    Start: 2024  
End: 2024           Telephone encounter       Devon Ruiz MD  
Work Phone:   
[phone]                          OB/Gynecology  
   
                                                    Start: 2024  
End: 2024     ambulatory          [facility]  
   
Ambulatory  
   
                                                    Start: 2024  
End: 2024     ambulatory          [facility]  
   
                                                    Start: 2024  
End: 2024     ambulatory          [facility]   
Ambulatory  
   
                                                    Start: 2024  
End: 2024     ambulatory          [facility]ULI[facility]   
Ambulatory  
   
                                                    Start: 2024  
End: 2024     ambulatory          [facility]  
   
Ambulatory  
   
                                                    Start: 2024  
End: 2024                         Office outpatient visit 25   
minutes                                 Janie S Royal DO  
Work Phone:   
[phone]                          [facility]  
   
                                        Comment on above:   Irregular bleeding (  
Primary Dx);  
Depression, major, single episode, mild (CMS/HCC);  
Mixed hyperlipidemia;  
Anxiety;  
Other migraine without status migrainosus, not intractable;  
Taking medication for chronic disease   
   
                                                    Start: 2024  
End: 2024     ambulatory          [facility]  
   
                                                    Start: 2024  
End: 2024     ambulatory          [facility]  
   
                                                    Start: 2024  
End: 2024                         Office outpatient visit 15   
minutes                                 Janie S Royal DO  
Work Phone:   
[phone]                          [facility]  
   
                                        Comment on above:   Acute bronchitis, un  
specified organism (Primary Dx)   
   
                                                    Start: 2024  
End: 2024     ambulatory          [facility]  
   
Ambulatory  
   
                                        Start: 2023   Office outpatient vi  
sit 15   
minutes                                 Janie S Lebanon  
Work Phone:   
[phone]                          [employer]  
Work Phone:   
[phone]  
   
                          Start: 2023 ambulatory   Dr. Janie Cr  
cility:[id number]  
   
                                Start: 2023 Chart Update    Janie johnson  
Work Phone:   
[phone]                          [employer]  
Work Phone:   
[phone]  
   
                          Start: 2023 ambulatory   Dr. Janie Cr  
cility:[facility]  
   
                                        Start: 2023   Office outpatient vi  
sit 15   
minutes                                 Janie S Lebanon  
Work Phone:   
[phone]                          [employer]  
Work Phone:   
[phone]  
   
                          Start: 2023 ambulatory   Dr. Janie Cr  
cility:[id number]  
   
                          Start: 2023 ambulatory   Dr. Janie Cr  
cility:[id number]  
   
                                                    Start: 2023  
End: 2023                         Office outpatient visit 15   
minutes                                 Janie S Royal DO  
Work Phone:   
[phone]                          [facility]  
   
                                        Comment on above:   Hyperglycemia (Prima  
ry Dx);  
Depression, major, single episode, mild (CMS/HCC);  
Mixed hyperlipidemia;  
Postmenopausal bleeding   
   
                          Start: 2023 ambulatory   Antonieta Baker Facility:  
[id number]  
   
                                                    Start: 2023  
End: 2023                         Office outpatient visit 15   
minutes                                 Janie Parish DO  
Work Phone:   
[phone]                          [facility]  
   
                                        Comment on above:   Depression, major, s  
william episode, mild (CMS/HCC) (Primary   
Dx);  
Anxiety   
   
                                Start: 2023 Chart Update    Janie johnson  
Work Phone:   
[phone]                          [facility]  
Work Phone:   
[phone]  
   
                                Start: 2023 Chart Update    Janie johnson  
Work Phone:   
[phone]                          [employer]  
Work Phone:   
[phone]  
   
                                        Start: 05-   Initial preventive   
medicine new patient   
40-64yrs                                Janie JON Lebanon  
Work Phone:   
[phone]                          [facility]  
Work Phone:   
[phone]  
   
                                Start: 05- ambulatory      MD ANTONIETA COOKMONA                                 Facility:[id number]  
   
                                                    Start: 2023  
End: 2023                         Office outpatient visit 25   
minutes                                 Janie Parish DO  
Work Phone:   
[phone]                          [facility]  
   
                                        Comment on above:   Encounter for screen  
ing mammogram for malignant neoplasm of   
breast   
(Primary Dx);  
Eczema, unspecified type;  
Class 3 severe obesity due to excess calories without serious   
comorbidity with body mass index (BMI) of 40.0 to 44.9 in adult   
(CMS/HCC);  
Depression, major, single episode, mild (CMS/HCC);  
Hyperglycemia   
   
                                Start: 2022 Rx Renewal      Janie johnson  
Work Phone:   
[phone]                          [facility]  
Work Phone:   
[phone]  
   
                                Start: 2022 AUDIT           Janie johnson  
Work Phone:   
[phone]                          [facility]  
Work Phone:   
[phone]  
   
                                Start: 10- AUDIT           Janie Rizo  
al  
Work Phone:   
[phone]                          [facility]  
Work Phone:   
[phone]  
   
                          Start: 10- ambulatory   Dr. Janie JON [place] Fa  
cility:[id number]  
   
                                        Start: 10-   Office outpatient vi  
sit 25   
minutes                                 Janie S [place]  
Work Phone:   
[phone]                          [facility]  
Work Phone:   
[phone]  
   
                                Start: 2022 Chart Update    Janie Rizo  
al  
Work Phone:   
[phone]                          [facility]  
Work Phone:   
[phone]  
   
                                        Start: 2022   Office outpatient vi  
sit 25   
minutes                                 Janie S [place]  
Work Phone:   
[phone]                          [facility]  
Work Phone:   
[phone]  
   
                                Start: 2022 Chart Update    Janie Rizo  
al  
Work Phone:   
[phone]                          [facility]  
Work Phone:   
[phone]  
   
                                Start: 2022 AUDIT           Janie Rizo  
al  
Work Phone:   
[phone]                          [place] [address]   
[place]  
Work Phone:   
[phone]  
   
                                Start: 2022 Chart Update    Janie Rizo  
al  
Work Phone:   
[phone]                          [address]  
Work Phone:   
[phone]  
   
                                        Start: 2022   Office outpatient vi  
sit 25   
minutes                                 Janie S [place]  
Work Phone:   
[phone]                          [facility]  
Work Phone:   
[phone]  
   
                                Start: 2022 Chart Update    aJnie Rizo  
al  
Work Phone:   
[phone]                          [facility]  
Work Phone:   
[phone]  
   
                                        Start: 2021   Office outpatient ne  
w 45   
minutes                                 Janie S [place]  
Work Phone:   
[phone]                          [facility]  
Work Phone:   
[phone]  
   
                                        Start: 2021   Office outpatient vi  
sit 15   
minutes                                 July Alfredo  
Work Phone:   
[phone]                          [facility]  
Work Phone:   
[phone]  
   
                                                    Start: 04-  
End: 2019                         Patient encounter   
procedure                 Carmen Ngo           Facility:[facility]  
   
                                                    Start: 10-  
End: 10-                         Patient encounter   
procedure                 Carmen Ngo           Facility:[facility]  
   
                                                    Start: 10-  
End: 10-                         Patient encounter   
procedure                 Carmen Ngo           Facility:[facility]  
   
                                                    Start: 10-  
End: 10-                         Patient encounter   
procedure                 Carmen Ngo           Facility:[facility]  
   
                                                    Start: 2018  
End: 2018                         Patient encounter   
procedure                 Carmenkristi Ngo           Facility:[facility]  
   
                                                    Start: 2018  
End: 2018                         Patient encounter   
procedure                 Carmenkristi Ngo           Facility:[facility]  
   
                                                    Start: 2018  
End: 06-                         Patient encounter   
procedure                 Maya SARAH Russell           Facility:[facility]  
   
                                                            Encounter for   
gynecological examination   
(general) (routine)   
without abnormal findings               Janie JON [place]  
Work Phone:   
[phone]                          [facility]  
Work Phone:   
[phone]  
   
                                        Comment on above:   ;   
   
                                                            Patient encounter   
procedure                               Janie Parish  
Work Phone:   
[phone]                          [facility]  
Work Phone:   
[phone]  
  
  
  
Procedures  
  
  
                          Date         Procedure    Procedure Detail Performing   
Clinician  
   
                                        Start: 10-   Us pelvic nonobstetr  
ic   
real-time image complete                            Devon Ruiz MD  
Work Phone:   
[phone]  
   
                                        Start: 2024   Microscopic observat  
ion   
[Identifier] in Cervix by   
Cyto stain                                          Kevyn BROWN  
Work Phone:   
[phone]  
   
                                        Start: 2024   FOLLOW UP IN FAMILY   
MEDICINE                                            JANIE PARISH  
   
                                        Start: 2024   Basic metabolic 2000  
 panel   
- Serum or Plasma                                   JANIE PARISH  
   
                                        Start: 2024   Hemoglobin   
A1c/Hemoglobin.total in   
Blood                                               JANIE PARISH  
   
                          Start: 2024 Lipid panel               JANIE R  
OYAL  
   
                                        Start: 2024   Lipid 1996 panel - S  
ariana   
or Plasma                                           Janie Royal DO  
Work Phone:   
[phone]  
   
                          Start: 2024 XR CHEST 2 VIEWS              ROSA  
LY ROYAL  
   
                          Start: 2023 Mammography               Janie R  
oyal DO  
Work Phone:   
[phone]  
   
                                        Start: 2022   Lipid 1996 panel - S  
ariana   
or Plasma                                           Janie Royal DO  
Work Phone:   
[phone]  
   
                          Start: 2022 Mammography               Janie R  
oyal DO  
Work Phone:   
[phone]  
   
                                        Start: 2022   Microscopic observat  
ion   
[Identifier] in Cervix by   
Cyto stain                                          Janie Royal DO  
Work Phone:   
[phone]  
   
                          Start: 2019 Colonoscopy               Janie R  
oyal DO  
Work Phone:   
[phone]  
   
                                       Appendectomy              July Alfredo  
Work Phone:   
[phone]  
   
                                        Comment on above:   ;   
   
                                       Colonoscopy               July Alfredo  
Work Phone:   
[phone]  
   
                                        Comment on above:   2019;   
   
                                       Extraction of wisdom tooth              R  
christian Alfredo  
Work Phone:   
[phone]  
   
                                       Renal lithotripsy              July ohlloway  
Work Phone:   
[phone]  
   
                                        Comment on above:   ;   
  
  
  
Plan of Treatment  
  
  
                          Date         Care Activity Detail       Author  
   
                                        Start: 2029   Screening for malign  
ant   
neoplasm of cervix                      Cervical Cancer   
Screening                               [facility]  
   
                                        Start: 2029   Screening for malign  
ant   
neoplasm of colon                                   [facility]  
   
                          Start: 2029 Lipid panel               [facility]  
   
                          Start: 2028 Diabetes Screening Diabetes Screenin  
[facility]  
   
                          Start: 2027 Lipid panel  Lipid Panel  [facility]  
   
                                        Start: 2027   Screening for malign  
ant   
neoplasm of cervix                                  [facility]  
   
                          Start: 2027 Diabetes Screening Diabetes Screenin  
[facility]  
   
                                        Start: 2027   Diabetes mellitus   
screening                 Diabetes Screening        [facility]  
   
                                        Start: 2026   Diabetes mellitus   
screening                 Diabetes Screening        [facility]  
   
                                                    Start: 2025  
End: 2025                         Patient encounter   
procedure                               2025 2:20 PM EDT   
Office Visit   
OB/Gynecology [address]   
[place]   
[address] [phone]   
Devon Ruiz MD   
[address] [place] [address]   
[phone] (Work)   
[phone] (Fax)   
Annual                                  OB/Gynecology  
   
                                        Comment on above:   Annual   
   
                                                    Start: 2025  
End: 2025                         Patient encounter   
procedure                               2025 9:15 AM EDT   
Office Visit   
OB/Gynecology [address]   
[place]   
[address] [phone]   
Houston Freeman APRN.CNP   
[address] [place] [address]   
[phone] (Work)   
[phone] (Fax) Post   
op 6w                                   OB/Gynecology  
   
                                        Comment on above:   Post op 6w   
   
                                                    Start: 2025  
End: 2025                         Patient encounter   
procedure                               2025 7:00 AM EST   
Office Visit [place] [address] [address]   
[id number] [phone]   
[place] [address] [address] [address]   
[phone] (Work)   
[phone] (Fax)                      [facility]  
   
                                                    Start: 2025  
End: 2025                         Patient encounter   
procedure                               2025 8:15 AM EST   
Office Visit   
OB/Gynecology [address]   
[place]   
[address] [phone]   
Houston Freeman, BRIAN.CNP   
[address] [place] [address]   
[phone] (Work)   
[phone] (Fax) Post   
op 1w                                   OB/Gynecology  
   
                                        Comment on above:   Post op 1w   
   
                                        Start: 2025   Screening for malign  
ant   
neoplasm of cervix                                  [facility]  
   
                                                    Start: 2025  
End: 2025     ESTROGEN FRACTION BL                     [facility]  
Work Phone:   
[phone]  
   
                                        Comment on above:   Expected: 2025  
, Expires: 2025   
   
                                                    Start: 2025  
End: 2025                         Follitropin   
[Units/volume] in Serum   
or Plasma                                           [facility]  
   
                                        Comment on above:   Expected: 2025  
, Expires: 2025   
   
                                                    Start: 2025  
End: 2025                         Lutropin [Units/volume]   
in Serum or Plasma                                  [facility]  
   
                                        Comment on above:   Expected: 2025  
, Expires: 2025   
   
                                                    Start: 2025  
End: 2025                         Patient encounter   
procedure                               2025 9:00 AM EST   
Office Visit   
OB/Gynecology [address]   
[place]   
[address] [phone]   
Devon Ruiz MD   
[address] [place] [address]   
[phone] (Work)   
[phone] (Fax)   
Discuss hysterectomy                    OB/Gynecology  
   
                                        Comment on above:   Discuss hysterectomy  
   
   
                                                    Start: 10-  
End: 10-                         Admission to same day   
surgery center                          10/16/2024 4:20 PM EDT   
[facility]   
OB/Gynecology [address]   
[place]   
[address] [phone]   
Devon Ruiz MD   
[address] [place] [address]   
[phone] (Work)   
[phone] (Fax) pre-   
op surgery 10/24 @[facility]                   OB/Gynecology  
   
                                        Comment on above:   pre- op surgery 10/2  
4 @[facility]   
   
                                                    Start: 10-  
End: 10-           ambulatory                10/03/2024 8:00 AM EDT   
Procedure OB/Gynecology   
[address] [place] [address]   
[phone] PMB   
(postmenopausal   
bleeding) [N95.0];   
Intramural leiomyoma of   
uterus [D25.1]                          OB/Gynecology  
   
                                        Comment on above:   PMB (postmenopausal   
bleeding) [N95.0]; Intramural leiomyoma of  
   
uterus [D25.1]   
   
                                                    Start: 2024  
End: 2024                         Patient encounter   
procedure                               2024 2:40 PM EDT   
Office Visit   
OB/Gynecology [address]   
[place]   
[address] [phone]   
Devon Ruiz MD   
[address] [place] [place] [address]   
[phone] (Work)   
[phone] (Fax) new   
pt est care                             OB/Gynecology  
   
                                        Comment on above:   new pt est care   
   
                                                    Start: 2024  
End: 2025           US Pelvis                 PELVIC US [facility]   
Imaging Routine PMB   
(postmenopausal   
bleeding) Intramural   
leiomyoma of uterus   
Expected: 2024,   
Expires: 2025                     [facility]  
Work Phone:   
[phone]  
   
                                        Comment on above:   Expected: 2024  
, Expires: 2025   
   
                                        Start: 2024   Covid-19 Vaccine (1   
-   
2023-24 season)                         Covid-19 Vaccine (1 -   
2023-24 season)                         [facility]  
   
                                        Start: 2024   Covid-19 Vaccine (5   
-   
2024-25 season)                         Covid-19 Vaccine (5 -   
2024-25 season)                         [facility]  
   
                          Start: 2024 Influenza vaccination Influenza Vacc  
ine (#1) [facility]  
   
                                                    Start: 2024  
End: 2025                         Basic metabolic 2000   
panel - Serum or Plasma                 Basic Metabolic Panel   
Lab Routine Taking   
medication for chronic   
disease Expected:   
2024   
(Approximate), Expires:   
2025                              [facility]  
Work Phone:   
[phone]  
   
                                        Comment on above:   Expected: 2024  
 (Approximate), Expires: 2025   
   
                                        Start: 2024   Screening for malign  
ant   
neoplasm of breast                                  [facility]  
   
                                                    Start: 2024  
End: 2024                         Basic metabolic 2000   
panel - Serum or Plasma                 Basic Metabolic Panel   
Lab Routine   
Hyperglycemia Expected:   
2024   
(Approximate), Expires:   
2024                              [facility]  
Work Phone:   
[phone]  
   
                                        Comment on above:   Expected: 2024  
 (Approximate), Expires: 2024   
   
                                                    Start: 2024  
End: 2024                         Hemoglobin   
A1c/Hemoglobin.total in   
Blood                                   Hemoglobin A1C Lab   
Routine Hyperglycemia   
Expected: 2024   
(Approximate), Expires:   
2024                              [facility]  
Work Phone:   
[phone]  
   
                                        Comment on above:   Expected: 2024  
 (Approximate), Expires: 2024   
   
                                                    Start: 2024  
End: 2024                         Lipid 1996 panel -   
Serum or Plasma                         Lipid panel Lab Routine   
Mixed hyperlipidemia   
Expected: 2024   
(Approximate), Expires:   
2024                              [facility]  
Work Phone:   
[phone]  
   
                                        Comment on above:   Expected: 2024  
 (Approximate), Expires: 2024   
   
                                                    Start: 2024  
End: 2024                         Patient encounter   
procedure                               2024 7:00 AM EST   
Office Visit [facility] [place]   
[id number] [phone]   
Janie Parish DO   
 [facility]   
[place], [place]   
[id number] [phone]   
(Work) [phone]   
(Fax)                                   [facility]  
   
                                                    Start: 2024  
End: 2025           XR Chest 2 Views          XR chest 2 views Imaging   
Routine Acute   
bronchitis, unspecified   
organism Expected:   
2024   
(Approximate), Expires:   
2025                              [facility]  
Work Phone:   
[phone]  
   
                                        Comment on above:   Expected: 2024  
 (Approximate), Expires: 2025   
   
                                        Start: 2023   COVID-19 Vaccine (4   
-   
Pfizer series)                          COVID-19 Vaccine (4 -   
Pfizer series)                          [facility]  
   
                                                    Start: 10-  
End: 2024                         Basic metabolic 2000   
panel - Serum or Plasma                 Basic Metabolic Panel   
Lab Routine   
Hyperglycemia Expected:   
10/05/2023   
(Approximate), Expires:   
2024                              [facility]  
Work Phone:   
[phone]  
   
                                        Comment on above:   Expected: 10/05/2023  
 (Approximate), Expires: 2024   
   
                                                    Start: 10-  
End: 2024                         Hemoglobin   
A1c/Hemoglobin.total in   
Blood                                   Hemoglobin A1c Lab   
Routine Hyperglycemia   
Expected: 10/05/2023   
(Approximate), Expires:   
2024                              [facility]  
Work Phone:   
[phone]  
   
                                        Comment on above:   Expected: 10/05/2023  
 (Approximate), Expires: 2024   
   
                                        Start: 2023   FUV, Provider:   
Antonieta Baker, Status:   
Pen, Time: 2:30 PM                      FUV, Provider:   
Antonieta Baker, Status:   
Pen, Time: 2:30 PM                      [address]  
Work Phone:   
[phone]  
   
                          Start: 2023 Influenza vaccination              [facility]  
   
                                                    Start: 2023  
End: 2024           US Pelvis transvaginal    US pelvis transvaginal   
Imaging Routine   
Postmenopausal bleeding   
Expected: 2023   
(Approximate), Expires:   
2024                              [facility]  
Work Phone:   
[phone]  
   
                                        Comment on above:   Expected: 2023  
 (Approximate), Expires: 2024   
   
                                                    Start: 2023  
End: 2024                         Thyrotropin   
[Units/volume] in Serum   
or Plasma                               TSH Lab Routine Anxiety   
Expected: 2023   
(Approximate), Expires:   
2024                              [facility]  
Work Phone:   
[phone]  
   
                                        Comment on above:   Expected: 2023  
 (Approximate), Expires: 2024   
   
                                                    Start: 2023  
End: 2024                         BI mammo bilateral   
screening tomosynthesis                 BI mammo bilateral   
screening tomosynthesis   
Imaging Routine   
Encounter for screening   
mammogram for malignant   
neoplasm of breast   
Expected: 2023,   
Expires: 2024                     [facility]  
Work Phone:   
[phone]  
   
                                        Comment on above:   Expected: 2023  
, Expires: 2024   
   
                                        Start: 2023   EPV, Provider:   
Janie Parish, Status:   
Pen, Time: 7:00 AM                      EPV, Provider:   
Janie Parish, Status:   
Pen, Time: 7:00 AM                      [facility]  
Work Phone:   
[phone]  
   
                                        Start: 2023   Screening for malign  
ant   
neoplasm of breast        Mammogram                 [facility]  
   
                                        Start: 10-   EPV, Provider:   
Janie Parish, Status:   
Pen, Time: 7:00 AM                      EPV, Provider:   
Janie Parish, Status:   
Pen, Time: 7:00 AM                      [facility]  
Work Phone:   
[phone]  
   
                                        Start: 2022   EPV, Provider:   
Janie Parish, Status:   
Pen, Time: 7:00 AM                      EPV, Provider:   
Janie Parish, Status:   
Pen, Time: 7:00 AM                      [facility]  
Work Phone:   
[phone]  
   
                                        Start: 2022   EPV, Provider:   
Janie Parish, Status:   
Pen, Time: 7:00 AM                      EPV, Provider:   
Janie Parish, Status:   
Pen, Time: 7:00 AM                      [facility]  
Work Phone:   
[phone]  
   
                                        Start: 2022   PLSOXMONTR, Provider  
:   
[facility]   
THERAPISTAnderson SanatoriumMONITOR,   
Status: Pen, Time: 2:45   
PM                                      PLSOXMONTR, Provider:   
[facility]   
THERAPIST,[facility] SanatoriumMONITOR,   
Status: Pen, Time: 2:45   
PM                                      [facility]  
Work Phone:   
[phone]  
   
                                        Start: 2022   NPV, Provider:   
Antonieta Baker, Status:   
Pen, Time: 11:00 AM                     NPV, Provider:   
Antonieta Baker, Status:   
Pen, Time: 11:00 AM                     [facility]  
Work Phone:   
[phone]  
   
                                        Start: 2021   FUV, Provider:   
July Alfredo, Status:   
Pen, Time: 3:45 PM                      FUV, Provider:   
July Alfredo, Status:   
Pen, Time: 3:45 PM                      [facility]  
Work Phone:   
[phone]  
   
                                        Start: 2021   COVID-19 Vaccine (3   
-   
Booster for Pfizer   
series)                                 COVID-19 Vaccine (3 -   
Booster for Pfizer   
series)                                 [facility]  
   
                                        Start: 04-   Pneumococcal Vaccine  
:   
50+ (1 of 1 - PCV)                      Pneumococcal Vaccine:   
50+ (1 of 1 - PCV)                      [facility]  
   
                                        Start: 04-   Shingrix Vaccine (1   
of   
2)                                      Shingrix Vaccine (1 of   
2)                                      [facility]  
   
                                        Start: 04-   Zoster Vaccines (1 o  
f   
2)                        Zoster Vaccines (1 of 2)  [facility]  
   
                          Start: 04- Diabetes Screening Diabetes Screenin  
g [facility]  
   
                          Start: 04- Lipid panel  Lipid Screening [facility]  
   
                                        Start: 04-   Screening for malign  
ant   
neoplasm of colon                                   [facility]  
   
                                        Start: 04-   Screening for malign  
ant   
neoplasm of breast        Mammogram Screening       [facility]  
   
                                        Start: 04-   DTaP/Tdap/Td Vaccine  
s   
(1 - Tdap)                              DTaP/Tdap/Td Vaccines (1   
- Tdap)                                 [facility]  
   
                                        Start: 04-   Screening for malign  
ant   
neoplasm of cervix                                  [facility]  
   
                                        Start: 04-   Hepatitis B Vaccine   
(1   
of 3 - 19+ 3-dose   
series)                                 Hepatitis B Vaccine (1   
of 3 - 19+ 3-dose   
series)                                 [facility]  
   
                                        Start: 04-   Hepatitis B Vaccines  
 (1   
of 3 - 19+ 3-dose   
series)                                 Hepatitis B Vaccines (1   
of 3 - 19+ 3-dose   
series)                                 [facility]  
   
                                        Start: 04-   Urine microalbumin   
profile                                 DTaP,Tdap,Td Vaccine (1   
- Tdap)                                 [facility]  
   
                          Start: 04- Anxiety Screening Anxiety Screening   
[facility]  
   
                          Start: 04- Depression Screening Depression Scre  
ening [facility]  
   
                          Start: 04- Hepatitis C screening Hepatitis C Sc  
[facility]  
   
                          Start: 04- HIV screening HIV Screening [facility]  
   
                                        Start: 04-   MMR Vaccines (1 of 1  
 -   
Standard series)                        MMR Vaccines (1 of 1 -   
Standard series)                        [facility]  
   
                                        Start: 1970   Hepatitis B Vaccines  
 (1   
of 3 - 3-dose series)                   Hepatitis B Vaccines (1   
of 3 - 3-dose series)                   [facility]  
   
                          Start: 1970 HIV screening HIV Screening [facility]  
   
                                        Start: 1970   Screening for malign  
ant   
neoplasm of colon                                   [facility]  
   
                          Start: 1970 Yearly Adult Physical Yearly Adult P  
hysical [facility]  
   
                                                PAP TEST        PAP TEST Lab Fela crespo   
Encounter for   
gynecological   
examination (general)   
(routine) without   
abnormal findings   
Encounter for screening   
for malignant neoplasm   
of cervix Special   
screening examination   
for human papillomavirus   
(HPV) 2024 3:42 PM   
EDT                                     [facility]  
  
  
  
Immunizations  
  
  
                      Immunization Date Immunization Notes      Care Provider Shaye warren  
   
                                        10-          influenza, injectabl  
e,   
quadrivalent,   
preservative free                                   Devon Ruiz MD  
Work Phone:   
[phone]                          [facility]  
   
                                        10-          influenza virus   
vaccine, unspecified   
formulation                                         Devon Ruiz MD  
Work Phone:   
[phone]                          [facility]  
   
                                        10-          influenza, seasonal,  
   
injectable                                          Janie S [place]  
Work Phone:   
[phone]                          [facility]  
Work Phone:   
[phone]  
   
                                        Comment on above:   Series:   
   
                                        10-          influenza virus   
vaccine, unspecified   
formulation                                         Janie Royal DO  
Work Phone:   
[phone]                          [facility]  
Work Phone:   
[phone]  
   
                                        2021          Pfizer-BioNTech   
COVID-19 Vacc 30   
MCG/0.3ML Intramuscular   
Suspension                                          [facility]  
Work Phone:   
[phone]                          [facility]  
Work Phone:   
[phone]  
   
                                        Comment on above:   Series:   
   
                                        2021          Pfizer-BioNTech   
COVID-19 Vacc 30   
MCG/0.3ML Intramuscular   
Suspension                                          July  Douguo  
Work Phone:   
[phone]                          [facility]  
Work Phone:   
[phone]  
   
                                        Comment on above:   Series:   
   
                                        10-          Influenza, injectabl  
e,   
[place] Canine   
Kidney, preservative   
free, quadrivalent                                  [facility]  
Work Phone:   
[phone]                          [facility]  
Work Phone:   
[phone]  
   
                                        10-          influenza, seasonal,  
   
injectable                                          Janei Royal DO  
Work Phone:   
[phone]                          [facility]  
Work Phone:   
[phone]  
   
                                        2019          influenza, injectabl  
e,   
quadrivalent,   
preservative free;   
Translations: [Flulaval   
Quadrivalent 0.5 ML   
Intramuscular   
Suspension Prefilled   
Syringe]                                            July Alfredo  
Work Phone:   
[phone]                          [facility]  
Work Phone:   
[phone]  
   
                                        Comment on above:   Series:   
   
                                        2019          influenza, seasonal,  
   
injectable                                          Janie Parish DO  
Work Phone:   
[phone]                          [facility]  
Work Phone:   
[phone]  
   
                                        10-          influenza, injectabl  
e,   
quadrivalent,   
preservative free                                   July Alfredo  
Work Phone:   
[phone]                          [facility]  
Work Phone:   
[phone]  
   
                                        2017          influenza, injectabl  
e,   
quadrivalent,   
preservative free                                   July Alfredo  
Work Phone:   
[phone]                          [facility]  
Work Phone:   
[phone]  
   
                                        10-          influenza virus   
vaccine, whole virus                                Julywillard Alfredo  
Work Phone:   
[phone]                          [facility]  
Work Phone:   
[phone]  
  
  
  
Payers  
  
  
                          Date         Payer Category Payer        Policy ID  
   
                          2024   Unknown                   [id number]  
   
                          2023   Unknown                   [id number]  
   
                          2023   Unknown                   [id number]  
   
                          2018   Unknown                     
   
                          1968   Unknown                   [id number] 2.16.84  
[id number]  
   
                          1968   Unknown                   [id number] 2.16.84  
[id number]  
   
                          1968   Unknown                   [id number] 2.16.8  
[id number]  
   
                          1968   Unknown                   [id number] 2.16.8  
[id number]  
   
                          1968   Unknown                   [id number] 2.16.8  
[id number]  
   
                          1968   Unknown                   [id number] 2.16.  
[id number]  
   
                          1968   Unknown                   [id number] 2.16.  
[id number]  
   
                          1968   Unknown                   [id number] 2.16.  
[id number]  
   
                          1968   Unknown                   [id number] 2.16.  
[id number]  
   
                          1968   Unknown                   [id number] 2.16.  
[id number]  
   
                          1968   Unknown                   [id number] 2.16.  
[id number]  
   
                          1968   Unknown                   [id number] 2.16.  
[id number]  
   
                          1968   Unknown                   [id number] 2.16.  
[id number]  
   
                          1968   Unknown                   [id number] 2.16.  
[id number]  
   
                          1968   Unknown                   [id number] 2.16.8  
[id number]  
   
                          1968   Unknown                   [id number] 2.16.8  
[id number]  
   
                          1968   Unknown                   [id number] 2.16.8  
[id number]  
   
                          1968   Unknown                   [id number] 2.16.8  
[id number]   Unknown                   [id number] 2.16.8  
[id number]   Unknown                   [id number] 2.16.8  
[id number]   Unknown                   [id number] 2.16.84  
[id number]  
  
  
  
Social History  
  
  
                          Date         Type         Detail       Facility  
   
                                                    Start: 2023  
End: 2024                         Never smoked   
cigarettes                              Never smoked   
cigarettes                              [facility]  
   
                                                    Start: 2023  
End: 2024                         Tobacco smoking   
status NHIS               Never smoked tobacco      [facility]  
Work Phone:   
[phone]  
   
                                                    Start: 2023  
End: 2024                         Tobacco use and   
exposure                                Smokeless tobacco   
non-user                                [facility]  
Work Phone:   
[phone]  
   
                                                    Start: 2023  
End: 2024     Tobacco use panel                       [facility]  
   
                          Start: 1970 Sex Assigned At Birth Not on file  [facility]  
Work Phone:   
[phone]  
   
                                                    Start: 2023  
End: 2024                         Exposure to   
SARS-CoV-2 (event)        Not sure                  [facility]  
Work Phone:   
[phone]  
   
                                                            Tobacco smoking   
status NHIS                             Tobacco smoking   
consumption unknown                     [facility]  
   
                                                    Start: 2024  
End: 2025                         Alcoholic beverage   
intake                                  Current drinker of   
alcohol (finding)                       [facility]  
   
                          Start: 2024 Alcohol Comment rare         [facility]  
   
                                                            National Score   
(1-100), lower number   
is lower risk             69                        [facility]  
   
                                        Start: 2024   Alcoholic beverage   
intake                                  Lifetime non-drinker   
(finding)                               [facility]  
Work Phone:   
[phone]  
   
                                                    NEGATED: Highlighted   
rowStart: MARINA                          History of tobacco   
use                       Passive smoker            [facility]  
Work Phone:   
[phone]  
  
  
  
Clinical Notes 2021 to 01-  
   Telephone Encounter - Kevyn Blood MA - 01/15/2025 4:32 PM ESTTelephone   
Encounter - Kevyn Blood MA - 01/15/2025 4:32 PM ESTTelephone Encounter - 
Jyotsna Cortes RN - 2025 10:35 AM EST  
  
                                Note Date & Type Note            Facility  
   
                                                    01- Telephone   
encounter Note                          Formatting of this note might   
be different from the original.  
[place] paperwork completed and on   
providers desk for signature.  
Kevyn Blood MA  
  
Electronically signed by Kevyn Blood MA at 01/15/2025 4:33   
PM EST  
                                        [facility]  
   
                                                    01- Miscellaneous   
Notes                                   Formatting of this note might   
be different from the original.  
[place] paperwork completed and on   
providers desk for signature.  
Kevyn Blood MA  
  
Electronically signed by Kevyn Blood MA at 01/15/2025 4:33   
PM EST  
Formatting of this note might   
be different from the original.  
Printed and placed in [place]   
mailbox.  
Jyotsna Cortes RN  
  
Electronically signed by   
Jyotsna Cortes RN at   
2025 10:36 AM EST  
documented in this encounter            [facility]  
   
                                                    2025 Telephone   
encounter Note                          Formatting of this note might   
be different from the original.  
Printed and placed in [place]   
mailbox.  
Jyotsna Cortes RN  
  
Electronically signed by   
Jyotsna Cortes RN at   
2025 10:36 AM EST  
                                        [facility]  
   
                                                    01- Telephone   
encounter Note                          Formatting of this note might   
be different from the original.  
Patient notified.  
Jyotsna Cortes RN  
  
Electronically signed by   
Jyotsna Cortes RN at   
01/10/2025 3:16 PM EST  
                                        [facility]  
   
                                                    01- Miscellaneous   
Notes                                   Formatting of this note might   
be different from the original.  
Patient notified.  
Jyotsna Cortes RN  
  
Electronically signed by   
Jyotsna Cortes RN at   
01/10/2025 3:16 PM EST  
Formatting of this note might   
be different from the original.  
Ok to continue until day before   
her surgery. May need to take   
BID or TID for a couple of days   
to slow bleeding. Devon Ruiz MD  
Electronically signed by   
Devon Ruiz MD at   
01/10/2025 3:11 PM EST  
Formatting of this note might   
be different from the original.  
Patient calling regarding her   
bleeding. She took last pill of   
norethindrone two days ago and   
bleeding started again this   
morning. It is heavy like it   
was. Changing pad every 1 hour   
so far this morning again.   
Asking if she should restart   
norethindrone? Should she   
continue it until her scheduled   
surgery? Okay to leave detailed   
message when calling her back.  
Jyotsna Cortes RN  
  
Electronically signed by   
Jyotsna Cortes RN at   
01/10/2025 9:12 AM EST  
documented in this encounter            [facility]  
   
                                                    01- Telephone   
encounter Note                          Formatting of this note might   
be different from the original.  
Ok to continue until day before   
her surgery. May need to take   
BID or TID for a couple of days   
to slow bleeding. Devon Ruiz MD  
Electronically signed by   
Devon Ruiz MD at   
01/10/2025 3:11 PM [facility]  
   
                                                    01- Telephone   
encounter Note                          Formatting of this note might   
be different from the original.  
Patient calling regarding her   
bleeding. She took last pill of   
norethindrone two days ago and   
bleeding started again this   
morning. It is heavy like it   
was. Changing pad every 1 hour   
so far this morning again.   
Asking if she should restart   
norethindrone? Should she   
continue it until her scheduled   
surgery? Okay to leave detailed   
message when calling her back.  
Jyotsna Cortes RN  
  
Electronically signed by   
Jyotsna Cortes RN at   
01/10/2025 9:12 AM [facility]  
   
                                                    2025 History and   
physical note                           Formatting of this note is   
different from the original.  
Pre-Op History and Physical  
  
HPI: The patient is a 54 year   
old female presenting for   
pre-operative visit.  
She is scheduled for LAVH and   
BSO, possible cystoscopy, for   
AUB, uterine fibroids  
on 25. Procedure discussed   
along with risks, benefits and   
complications. Other  
alternatives discussed for   
management. Consent form   
signed? Yes.  
  
PAST MEDICAL HISTORY  
Diagnosis Date  
Depression  
High cholesterol  
  
  
PAST SURGICAL HISTORY  
Procedure Laterality Date  
APPENDECTOMY   
COLONOSCOPY SCREENING 2019  
D&C, DIAG AND/OR THERAPEUTIC   
10/24/2024  
hysteroscopy  
EXTRACTION ERUPTED TOOTH/EXR  
unknown date  
LITHOTRIPSY XTRCORP SHOCK WAVE   
  
Renal Lithotripsy  
  
Current Outpatient Medications  
Medication Sig Dispense Refill  
SUMAtriptan (IMITREX) 50 mg   
tablet  
MAGNESIUM ORAL Take by mouth.  
norethindrone (AYGESTIN) 5 mg   
tablet 1 tab 3x/day until   
bleeding stops. 1 tab 2x/day x   
2 days. 1 tab daily x 5 days 35   
tablet 0  
atorvastatin (LIPITOR) 20 mg   
tablet Take 20 mg by mouth.  
ARIPiprazole (ABILIFY) 2 mg   
tablet Take 2 mg by mouth.  
albuterol HFA (PROVENTIL HFA,   
VENTOLIN HFA) 90 mcg/actuation   
inhaler Inhale 2 puffs every 4   
hours by inhalation route as   
needed.  
sertraline (ZOLOFT) 100 mg   
tablet Take 200 mg by mouth.  
  
No current   
facility-administered   
medications for this visit.  
  
ALLERGIES: Nut - Unspecified  
  
PERSONAL HISTORY:  
Social History  
  
Tobacco Use  
Smoking status: Never  
Smokeless tobacco: Never  
Vaping Use  
Vaping status: Never Used  
Substance Use Topics  
Alcohol use: Yes  
Comment: rare  
Drug use: Not Currently  
  
  
FAMILY HISTORY:  
FAMILY HISTORY  
Problem Relation Age of Onset  
Depression Mother  
Diabetes Mother  
other (hypercholesterolemia   
[other]) Mother  
other (htn) Mother  
Thyroid Mother  
Thyroid disease  
Alcohol abuse Father  
other (cva) Father  
Ischemic Heart Disease Father  
Diabetes Father  
other (hypercholesterolemia   
[other]) Father  
other (TIA) Father  
Parkinson's Father  
other (hypercholesterolemia   
[other]) Brother  
Brain Cancer Maternal   
Grandmother  
other (cardiac disorder)   
Maternal Grandfather  
other (htn) Maternal   
Grandfather  
other (CVA) Maternal   
Grandfather  
other (Cardiac disorder)   
Paternal Grandfather  
other (htn) Paternal   
Grandfather  
Diabetes Paternal Grandfather  
  
REVIEW OF SYMPTOMS:  
GENERAL: denies fevers or   
chills  
ENDOCRINOLOGY: has not been on   
steroids  
Cardiology : denies   
palpitations or chest pain  
Respiratory: denies SOB or   
cough  
Hematology: denies history of   
prolonged bleeding or easy   
bruising or VTE  
Allergy: Denies history of   
personal or family history of   
allergy to anesthesia  
  
  
PHYSICAL EXAMINATION:  
  
VITALS: Blood pressure 142/86,   
weight 111.1 kg (245 lb), last   
menstrual period 2025.  
  
GENERAL: The patient is well   
nourished, well hydrated in no   
acute distress. , The patient   
is oriented to time, place, and   
person.  
NECK: Supple. No   
lynphadenopathy, normal   
thyroid, no thyromegaly.  
LUNGS: Clear to auscultation   
bilaterally. no wheezes,   
rhonchi or rales  
HEART: Regular rate and rhythm,   
Normal heart sounds, and No   
murmurs or gallops  
GENITALIA: Normal external   
genitalia, Urethral meatus   
normal, Bladder nontender,   
normal vagina and normal   
vaginal tone, normal cervix,   
normal uterus, size and   
consistency, normal adnexa   
without masses or tenderness,   
and perineum WNL  
WET PREP: Not indicated  
  
IMPRESSION: uterine fibroids,   
AUB  
  
PLAN: The   
risks/benefits/alternatives and   
personal involved for the   
planned LAVH, BSO, possible   
cystoscopy were reviewed with   
the patient. Her questions were   
answered to her satisfaction   
and she desires to proceed.   
Consent was signed. I reviewed   
with her postop instructions   
and expectations.  
  
I have reviewed and updated   
past medical and surgical   
history, medications and   
allergies  
Devon Ruiz M.D.  
Electronically signed by   
Devon Ruiz MD at   
2025 3:46 PM EST  
                                        [facility]  
   
                                                    2025 History and   
physical note                           Formatting of this note is   
different from the original.  
Pre-Op History and Physical  
  
HPI: The patient is a 54 year   
old female presenting for   
pre-operative visit.  
She is scheduled for LAVH and   
BSO, possible cystoscopy, for   
AUB, uterine fibroids  
on 25. Procedure discussed   
along with risks, benefits and   
complications. Other  
alternatives discussed for   
management. Consent form   
signed? Yes.  
  
PAST MEDICAL HISTORY  
Diagnosis Date  
Depression  
High cholesterol  
  
  
PAST SURGICAL HISTORY  
Procedure Laterality Date  
APPENDECTOMY   
COLONOSCOPY SCREENING 2019  
D&C, DIAG AND/OR THERAPEUTIC   
10/24/2024  
hysteroscopy  
EXTRACTION ERUPTED TOOTH/EXR  
unknown date  
LITHOTRIPSY XTRCORP SHOCK WAVE   
  
Renal Lithotripsy  
  
Current Outpatient Medications  
Medication Sig Dispense Refill  
SUMAtriptan (IMITREX) 50 mg   
tablet  
MAGNESIUM ORAL Take by mouth.  
norethindrone (AYGESTIN) 5 mg   
tablet 1 tab 3x/day until   
bleeding stops. 1 tab 2x/day x   
2 days. 1 tab daily x 5 days 35   
tablet 0  
atorvastatin (LIPITOR) 20 mg   
tablet Take 20 mg by mouth.  
ARIPiprazole (ABILIFY) 2 mg   
tablet Take 2 mg by mouth.  
albuterol HFA (PROVENTIL HFA,   
VENTOLIN HFA) 90 mcg/actuation   
inhaler Inhale 2 puffs every 4   
hours by inhalation route as   
needed.  
sertraline (ZOLOFT) 100 mg   
tablet Take 200 mg by mouth.  
  
No current   
facility-administered   
medications for this visit.  
  
ALLERGIES: Nut - Unspecified  
  
PERSONAL HISTORY:  
Social History  
  
Tobacco Use  
Smoking status: Never  
Smokeless tobacco: Never  
Vaping Use  
Vaping status: Never Used  
Substance Use Topics  
Alcohol use: Yes  
Comment: rare  
Drug use: Not Currently  
  
  
FAMILY HISTORY:  
FAMILY HISTORY  
Problem Relation Age of Onset  
Depression Mother  
Diabetes Mother  
other (hypercholesterolemia   
[other]) Mother  
other (htn) Mother  
Thyroid Mother  
Thyroid disease  
Alcohol abuse Father  
other (cva) Father  
Ischemic Heart Disease Father  
Diabetes Father  
other (hypercholesterolemia   
[other]) Father  
other (TIA) Father  
Parkinson's Father  
other (hypercholesterolemia   
[other]) Brother  
Brain Cancer Maternal   
Grandmother  
other (cardiac disorder)   
Maternal Grandfather  
other (htn) Maternal   
Grandfather  
other (CVA) Maternal   
Grandfather  
other (Cardiac disorder)   
Paternal Grandfather  
other (htn) Paternal   
Grandfather  
Diabetes Paternal Grandfather  
  
REVIEW OF SYMPTOMS:  
GENERAL: denies fevers or   
chills  
ENDOCRINOLOGY: has not been on   
steroids  
Cardiology : denies   
palpitations or chest pain  
Respiratory: denies SOB or   
cough  
Hematology: denies history of   
prolonged bleeding or easy   
bruising or VTE  
Allergy: Denies history of   
personal or family history of   
allergy to anesthesia  
  
  
PHYSICAL EXAMINATION:  
  
VITALS: Blood pressure 142/86,   
weight 111.1 kg (245 lb), last   
menstrual period 2025.  
  
GENERAL: The patient is well   
nourished, well hydrated in no   
acute distress. , The patient   
is oriented to time, place, and   
person.  
NECK: Supple. No   
lynphadenopathy, normal   
thyroid, no thyromegaly.  
LUNGS: Clear to auscultation   
bilaterally. no wheezes,   
rhonchi or rales  
HEART: Regular rate and rhythm,   
Normal heart sounds, and No   
murmurs or gallops  
GENITALIA: Normal external   
genitalia, Urethral meatus   
normal, Bladder nontender,   
normal vagina and normal   
vaginal tone, normal cervix,   
normal uterus, size and   
consistency, normal adnexa   
without masses or tenderness,   
and perineum WNL  
WET PREP: Not indicated  
  
IMPRESSION: uterine fibroids,   
AUB  
  
PLAN: The   
risks/benefits/alternatives and   
personal involved for the   
planned LAVH, BSO, possible   
cystoscopy were reviewed with   
the patient. Her questions were   
answered to her satisfaction   
and she desires to proceed.   
Consent was signed. I reviewed   
with her postop instructions   
and expectations.  
  
I have reviewed and updated   
past medical and surgical   
history, medications and   
allergies  
Devon Ruiz M.D.  
Electronically signed by   
Devon Ruiz MD at   
2025 3:46 PM EST  
documented in this encounter            [facility]  
   
                                        2025 Note     HNO ID: [id number]  
Author: DEVON RUIZ MD  
Service: ?  
Author Type: Physician  
Type: Progress Notes  
Filed: 2025 15:46  
Note Text:  
Cassi De La Torre is a 54 year   
old female who presents for   
problem visit for  
f/u AUB.  
HPI: 54 YOF s/p hysteroscopy   
DANDC in Oct. 2024 presents c/o   
AUB> Woke up 25  
w/ heavy menses, seen here .   
Started on Aygestin and   
bleeding trailed off  
within 3 days. Likely   
postmenopausal based on FSH and   
LH drawn in the past.  
However she denies having any   
vaginal dryness, hot flashes or   
night sweats. Had  
3 episodes of bleeding in the   
past year.  
OB History  
 T2  L2  
SAB0 IAB0 Ectopic0 Multiple0   
Live Births0  
Gyn History  
LMP: 09/15/2024 (Approximate),   
Having periods  
Age at Menarche:  
Age at First Pregnancy:  
Age at Menopause:  
Gyn History Comments:  
Sexual Activity: Yes; Male  
Contraception: Vasectomy  
PAST MEDICAL HISTORY  
Diagnosis Date  
Depression  
High cholesterol  
PAST SURGICAL HISTORY  
Procedure Laterality Date  
APPENDECTOMY   
COLONOSCOPY SCREENING 2019  
DANDC, DIAG AND/OR THERAPEUTIC   
10/24/2024  
hysteroscopy  
EXTRACTION ERUPTED TOOTH/EXR  
unknown date  
LITHOTRIPSY XTRCORP SHOCK WAVE   
  
Renal Lithotripsy  
FAMILY HISTORY  
Problem Relation Age of Onset  
Depression Mother  
Diabetes Mother  
other (hypercholesterolemia   
[other]) Mother  
other (htn) Mother  
Thyroid Mother  
Thyroid disease  
Alcohol abuse Father  
other (cva) Father  
Ischemic Heart Disease Father  
Diabetes Father  
other (hypercholesterolemia   
[other]) Father  
other (TIA) Father  
Parkinson's Father  
other (hypercholesterolemia   
[other]) Brother  
Brain Cancer Maternal   
Grandmother  
other (cardiac disorder)   
Maternal Grandfather  
other (htn) Maternal   
Grandfather  
other (CVA) Maternal   
Grandfather  
other (Cardiac disorder)   
Paternal Grandfather  
other (htn) Paternal   
Grandfather  
Diabetes Paternal Grandfather  
Social History  
Tobacco Use  
Smoking status: Never  
Smokeless tobacco: Never  
Vaping Use  
Vaping status: Never Used  
Substance Use Topics  
Alcohol use: Yes  
Comment: rare  
Drug use: Not Currently  
Current Outpatient Medications  
Medication Sig  
norethindrone (AYGESTIN) 5 mg   
tablet 1 tab 3x/day until   
bleeding stops. 1 tab  
2x/day x 2 days. 1 tab daily x   
5 days  
atorvastatin (LIPITOR) 20 mg   
tablet Take 20 mg by mouth.  
ARIPiprazole (ABILIFY) 2 mg   
tablet Take 2 mg by mouth.  
albuterol HFA (PROVENTIL HFA,   
VENTOLIN HFA) 90 mcg/actuation   
inhaler Inhale 2  
puffs every 4 hours by   
inhalation route as needed.  
sertraline (ZOLOFT) 100 mg   
tablet Take 200 mg by mouth.  
No current   
facility-administered   
medications for this visit.  
Allergies As of Date:   
2025  
Allergen Noted Reaction  
NUT - UNSPECIFIED 2023   
Anaphylaxis  
Fully Assessed 2025  
Allergies and current   
medication updated:Yes  
SENSITIVE EXAM: The sensitive   
examination was discussed with   
the Patient or  
Patient's Authorized   
Representative. As applicable,   
any other physician,  
advance practice provider,   
medical student, or other   
health professional student  
that will be observing or   
involved in the sensitive   
examination for educational  
or training purposes was   
discussed with the Patient or   
Authorized  
Representative. The Patient or   
Authorized Representative has   
agreed to proceed  
with the sensitive examination.   
(Sensitive examination includes   
inspection  
and/or palpation of the   
breasts, pelvis, prostate and   
anorectal regions).  
EXAM: LMP 09/15/2024  
GENERAL: pleasant,    
female in no apparent distress  
PELVIC: external genitalia   
normal, normal Bartholin's   
glands, urethra, Faxon's  
glands, no vulvar lesions, no   
cervical lesions, good vaginal   
support,  
physiologic discharge present,   
normal appearing perineal body   
and perianal  
region  
BIMANUAL: uterus normal size,   
shape and consistency, no   
adnexal masses, and  
non-tender  
ASSESSMENT AND PLAN:  
Assessment AND Plan  
Abnormal uterine bleeding  
Orders:  
COMPLETE BLOOD COUNT; Future  
BASIC METABOLIC PANEL; Future  
ESTROGEN FRACTION BL; Future  
FOLLICLE STIMULATING HORMONE;   
Future  
LUTEINIZING HORMONE; Future  
Check labs, reviewed pathology   
from recent hysteroscopy [facility],   
reviewed pelvic  
ultrasound from . I   
discussed with the patient that   
the continued bleeding  
has concerning. Could consider   
an IUD, however this may be   
postmenopausal  
bleeding and though no evidence   
of underlying malignancy has   
been identified at  
this point, there is a small   
chance of underlying malignancy   
that is difficult  
to diagnose. Ovaries appear   
normal. I discussed with her   
the option of  
continued hormonal suppression   
for the next 6 to 12 months,   
Mirena IUD  
insertion, or hysterectomy. At   
this point patient desires   
definitive therapy  
and resolution of the symptoms   
in the form of a hysterectomy.   
Risk-benefit and  
alternatives to   
laparoscopic-assisted vaginal   
hysterectomy with bilateral  
salpingo-oophorectomy and   
possible cystoscopy were   
reviewed with the patient,  
her questions (more content not   
included)...                            [facility]  
   
                                                    2025 History of Presen  
t   
illness Narrative                       Formatting of this note is   
different from the original.  
Cassi De La Torre is a 54 year   
old female who presents for   
problem visit for f/u AUB.  
  
HPI: 54 YOF s/p hysteroscopy   
D&C in Oct. 2024 presents c/o   
AUB> Woke up 23 w/ heavy   
menses, seen here . Started   
on Aygestin and bleeding   
trailed off within 3 days.   
Likely postmenopausal based on   
FSH and LH drawn in the past.   
However she denies having any   
vaginal dryness, hot flashes or   
night sweats. Had 3 episodes of   
bleeding in the past year.  
  
OB History  
 T2  L2  
SAB0 IAB0 Ectopic0 Multiple0   
Live Births0  
  
Gyn History  
  
LMP: 09/15/2024 (Approximate),   
Having periods  
Age at Menarche:  
Age at First Pregnancy:  
Age at Menopause:  
Gyn History Comments:  
Sexual Activity: Yes; Male  
Contraception: Vasectomy  
  
  
  
PAST MEDICAL HISTORY  
Diagnosis Date  
Depression  
High cholesterol  
  
PAST SURGICAL HISTORY  
Procedure Laterality Date  
APPENDECTOMY   
COLONOSCOPY SCREENING 2019  
D&C, DIAG AND/OR THERAPEUTIC   
10/24/2024  
hysteroscopy  
EXTRACTION ERUPTED TOOTH/EXR  
unknown date  
LITHOTRIPSY XTRCORP SHOCK WAVE   
  
Renal Lithotripsy  
  
FAMILY HISTORY  
Problem Relation Age of Onset  
Depression Mother  
Diabetes Mother  
other (hypercholesterolemia   
[other]) Mother  
other (htn) Mother  
Thyroid Mother  
Thyroid disease  
Alcohol abuse Father  
other (cva) Father  
Ischemic Heart Disease Father  
Diabetes Father  
other (hypercholesterolemia   
[other]) Father  
other (TIA) Father  
Parkinson's Father  
other (hypercholesterolemia   
[other]) Brother  
Brain Cancer Maternal   
Grandmother  
other (cardiac disorder)   
Maternal Grandfather  
other (htn) Maternal   
Grandfather  
other (CVA) Maternal   
Grandfather  
other (Cardiac disorder)   
Paternal Grandfather  
other (htn) Paternal   
Grandfather  
Diabetes Paternal Grandfather  
  
Social History  
  
Tobacco Use  
Smoking status: Never  
Smokeless tobacco: Never  
Vaping Use  
Vaping status: Never Used  
Substance Use Topics  
Alcohol use: Yes  
Comment: rare  
Drug use: Not Currently  
  
Current Outpatient Medications  
Medication Sig  
norethindrone (AYGESTIN) 5 mg   
tablet 1 tab 3x/day until   
bleeding stops. 1 tab 2x/day x   
2 days. 1 tab daily x 5 days  
atorvastatin (LIPITOR) 20 mg   
tablet Take 20 mg by mouth.  
ARIPiprazole (ABILIFY) 2 mg   
tablet Take 2 mg by mouth.  
albuterol HFA (PROVENTIL HFA,   
VENTOLIN HFA) 90 mcg/actuation   
inhaler Inhale 2 puffs every 4   
hours by inhalation route as   
needed.  
sertraline (ZOLOFT) 100 mg   
tablet Take 200 mg by mouth.  
  
No current   
facility-administered   
medications for this visit.  
  
Allergies As of Date:   
2025  
Allergen Noted Reaction  
NUT - UNSPECIFIED 2023   
Anaphylaxis  
  
Fully Assessed 2025  
  
Allergies and current   
medication updated:Yes  
  
SENSITIVE EXAM: The sensitive   
examination was discussed with   
the Patient or Patient's   
Authorized Representative. As   
applicable, any other   
physician, advance practice   
provider, medical student, or   
other health professional   
student that will be observing   
or involved in the sensitive   
examination for educational or   
training purposes was discussed   
with the Patient or Authorized   
Representative. The Patient or   
Authorized Representative has   
agreed to proceed with the   
sensitive examination.   
(Sensitive examination includes   
inspection and/or palpation of   
the breasts, pelvis, prostate   
and anorectal regions).  
  
EXAM: LMP 09/15/2024  
  
  
GENERAL: pleasant,    
female in no apparent distress  
  
PELVIC: external genitalia   
normal, normal Bartholin's   
glands, urethra, Faxon's   
glands, no vulvar lesions, no   
cervical lesions, good vaginal   
support, physiologic discharge   
present, normal appearing   
perineal body and perianal   
region  
BIMANUAL: uterus normal size,   
shape and consistency, no   
adnexal masses, and non-tender  
  
ASSESSMENT AND PLAN:  
Assessment & Plan  
Abnormal uterine bleeding  
  
Orders:  
COMPLETE BLOOD COUNT; Future  
BASIC METABOLIC PANEL; Future  
ESTROGEN FRACTION BL; Future  
FOLLICLE STIMULATING HORMONE;   
Future  
LUTEINIZING HORMONE; Future  
  
Check labs, reviewed pathology   
from recent hysteroscopy D&C,   
reviewed pelvic ultrasound from   
. I discussed with the   
patient that the continued   
bleeding has concerning. Could   
consider an IUD, however this   
may be postmenopausal bleeding   
and though no evidence of   
underlying malignancy has been   
identified at this point, there   
is a small chance of underlying   
malignancy that is difficult to   
diagnose. Ovaries appear   
normal. I discussed with her   
the option of continued   
hormonal suppression for the   
next 6 to 12 months, Mirena IUD   
insertion, or hysterectomy. At   
this point patient desires   
definitive therapy and   
resolution of the symptoms in   
the form of a hysterectomy.   
Risk-benefit and alternatives   
to laparoscopic-assisted   
vaginal hysterectomy with   
bilateral salpingo-oophorectomy   
and possible cystoscopy were   
reviewed with the patient, her   
questions were answered to her   
satisfaction she desires to   
proceed. We reviewed risks and   
benefits of ovarian   
conservation versus ovarian   
removal with possible need for   
estrogen therapy after removal   
of ovaries. Will wait and see   
if she is symptomatic after   
nephrectomy to decide on   
estrogen therapy. Patient's   
questions were answered her   
satisfaction and decision was   
made for surgery today. Will   
repeat hormone levels, as well   
as a CBC and BMP today.  
  
Devon Ruiz MD  
  
  
Electronically signed by   
Devon Ruiz MD at   
2025 3:46 PM EST  
documented in this encounter            [facility]  
   
                                                    2025 Telephone   
encounter Note                          Formatting of this note might   
be different from the original.  
Patient notified. Appointment   
with RR to discuss scheduled.   
Patient wanted EH to know that   
the Aygestin is helping. Thank   
you, she said. Mala Royal RN  
  
Electronically signed by   
Mala Royal RN at   
2025 10:47 AM EST  
                                        [facility]  
   
                                                    2025 Miscellaneous   
Notes                                   Formatting of this note might   
be different from the original.  
Patient notified. Appointment   
with RR to discuss scheduled.   
Patient wanted EH to know that   
the Aygestin is helping. Thank   
you, she said. Mala Royal RN  
  
Electronically signed by   
Mala Royal RN at   
2025 10:47 AM EST  
Formatting of this note might   
be different from the original.  
Left message for patient to   
call office.  
Latanya Soto RN  
  
Electronically signed by   
Latanya Soto RN at 2025   
10:25 AM EST  
Formatting of this note might   
be different from the original.  
Please call patient and let her   
know I discussed case with RR,   
who recommends hyster over IUD.   
Please schedule consult with RR   
for patient to discuss further  
Houston rFeeman APRN.CNP  
  
Electronically signed by Houston Freeman APRN.CNP at 2025   
10:21 AM EST  
documented in this encounter            [facility]  
   
                                                    2025 Telephone   
encounter Note                          Formatting of this note might   
be different from the original.  
Left message for patient to   
call office.  
Latanya Soto RN  
  
Electronically signed by   
Latanya Soto RN at 2025   
10:25 AM EST  
                                        [facility]  
   
                                                    2025 Telephone   
encounter Note                          Formatting of this note might   
be different from the original.  
Please call patient and let her   
know I discussed case with RR,   
who recommends hyster over IUD.   
Please schedule consult with RR   
for patient to discuss further  
Houston Freeman APRN.CNP  
  
Electronically signed by Houston Freeman APRN.CNP at 2025   
10:21 AM EST  
                                        [facility]  
   
                                        2025 Note     HNO ID: [id number]  
Author: HOUSTON FREEMAN APRN.CNP  
Service: ?  
Author Type: Nurse Practitioner  
Type: Progress Notes  
Filed: 2025 11:17  
Note Text:  
Chaperone offered: Patient   
declines.  
Cassi De La Torre is a 54 year   
old female who presents for   
problem visit of  
heavy bleeding for 1 day.  
HPI: Cassi presents for heavy   
bleeding that started at 0200   
this morning. She  
is bleeding through a   
pad/tampon hourly. Denies   
lightheadedness or dizziness.  
Denies pain. Hysteroscopy D+C   
on 10/24/24 with Dr. Ruiz.   
Pathology benign.  
Small fibroids noted on   
ultrasound 10/2024.  
Pap NILM/HPV negative   
OB History  
 T2  L2  
SAB0 IAB0 Ectopic0 Multiple0   
Live Births0  
Gyn History  
LMP: 09/15/2024 (Approximate),   
Having periods  
Age at Menarche:  
Age at First Pregnancy:  
Age at Menopause:  
Gyn History Comments:  
Sexual Activity: Yes; Male  
Contraception: Vasectomy  
PAST MEDICAL HISTORY  
Diagnosis Date  
Depression  
High cholesterol  
PAST SURGICAL HISTORY  
Procedure Laterality Date  
APPENDECTOMY   
COLONOSCOPY SCREENING 2019  
DANDC, DIAG AND/OR THERAPEUTIC   
10/24/2024  
hysteroscopy  
EXTRACTION ERUPTED TOOTH/EXR  
unknown date  
LITHOTRIPSY XTRCORP SHOCK WAVE   
2009  
Renal Lithotripsy  
FAMILY HISTORY  
Problem Relation Age of Onset  
Depression Mother  
Diabetes Mother  
other (hypercholesterolemia   
[other]) Mother  
other (htn) Mother  
Thyroid Mother  
Thyroid disease  
Alcohol abuse Father  
other (cva) Father  
Ischemic Heart Disease Father  
Diabetes Father  
other (hypercholesterolemia   
[other]) Father  
other (TIA) Father  
Parkinson's Father  
other (hypercholesterolemia   
[other]) Brother  
Brain Cancer Maternal   
Grandmother  
other (cardiac disorder)   
Maternal Grandfather  
other (htn) Maternal   
Grandfather  
other (CVA) Maternal   
Grandfather  
other (Cardiac disorder)   
Paternal Grandfather  
other (htn) Paternal   
Grandfather  
Diabetes Paternal Grandfather  
Social History  
Tobacco Use  
Smoking status: Never  
Smokeless tobacco: Never  
Vaping Use  
Vaping status: Never Used  
Substance Use Topics  
Alcohol use: Yes  
Comment: rare  
Drug use: Not Currently  
Current Outpatient Medications  
Medication Sig  
atorvastatin (LIPITOR) 20 mg   
tablet Take 20 mg by mouth.  
ARIPiprazole (ABILIFY) 2 mg   
tablet Take 2 mg by mouth.  
albuterol HFA (PROVENTIL HFA,   
VENTOLIN HFA) 90 mcg/actuation   
inhaler Inhale 2  
puffs every 4 hours by   
inhalation route as needed.  
sertraline (ZOLOFT) 100 mg   
tablet Take 200 mg by mouth.  
Cetirizine 10 mg cap Take 10 mg   
by mouth. (Patient not taking:   
Reported on  
2025)  
No current   
facility-administered   
medications for this visit.  
Allergies As of Date:   
2025  
Allergen Noted Reaction  
NUT - UNSPECIFIED 2023   
Anaphylaxis  
Fully Assessed 2025  
REVIEW OF SYSTEMS  
Expanded ROS: GYN: + abnormal   
uterine bleeding  
Allergies and current   
medication updated:Yes  
SENSITIVE EXAM: The sensitive   
examination was discussed with   
the Patient or  
Patient's Authorized   
Representative. As applicable,   
any other physician,  
advance practice provider,   
medical student, or other   
health professional student  
that will be observing or   
involved in the sensitive   
examination for educational  
or training purposes was   
discussed with the Patient or   
Authorized  
Representative. The Patient or   
Authorized Representative has   
agreed to proceed  
with the sensitive examination.   
(Sensitive examination includes   
inspection  
and/or palpation of the   
breasts, pelvis, prostate and   
anorectal regions).  
EXAM: /70   Wt 246 lb   
(111.6kg)   LMP 09/15/2024  
GENERAL: pleasant,    
female in no apparent distress  
HEENT: Normocephalic,   
atraumatic, mucus membranes   
moist, and no lesions  
CHEST: Normal inspiratory   
effort  
PELVIC: external genitalia   
normal, normal Bartholin's   
glands, urethra, Faxon's  
glands, no vulvar lesions, no   
cervical lesions, good vaginal   
support, + menses,  
normal appearing perineal body   
and perianal region  
NEURO: alert and oriented   
x3,exam grossly non-focal  
EXTREMITIES: normal  
ASSESSMENT AND PLAN:  
1. Abnormal uterine bleeding -   
ICD9: 626.9, ICD10: N93.9  
- Bleeding precautions reviewed   
- to go to ER if lightheaded,   
dizzy, short of  
breath, or experiencing chest   
pain. CBC ordered  
- Aygestin rx sent  
- Discussed Mirena IUD  
- Cassi requests opinion of   
Dr. Ruiz in regard to Mirena   
IUD - will forward  
chart  
BRIAN Taveras.CNP  
Medical Decision Making:  
Problems:  
Low: Acute, uncomplicated   
illness or injury  
Data:  
Unique test result(s) reviewed:   
2  
Unique test(s) ordered: 1  
Risk:  
Low: Low risk from   
testing/treatment  
Moderate: Drug management  
Medical Decision Making Level:   
4 - Moderate                            [facility]  
   
                                                    2025 History of Presen  
t   
illness Narrative                       Formatting of this note is   
different from the original.  
Chaperone offered: Patient   
declines.  
  
Cassi De La Torre is a 54 year   
old female who presents for   
problem visit of heavy bleeding   
for 1 day.  
  
HPI: Cassi presents for heavy   
bleeding that started at 0200   
this morning. She is bleeding   
through a pad/tampon hourly.   
Denies lightheadedness or   
dizziness. Denies pain.   
Hysteroscopy D+C on 10/24/24   
with Dr. Ruiz. Pathology   
benign. Small fibroids noted on   
ultrasound 10/2024.  
Pap NILM/HPV negative   
  
OB History  
 T2  L2  
SAB0 IAB0 Ectopic0 Multiple0   
Live Births0  
  
Gyn History  
  
LMP: 09/15/2024 (Approximate),   
Having periods  
Age at Menarche:  
Age at First Pregnancy:  
Age at Menopause:  
Gyn History Comments:  
Sexual Activity: Yes; Male  
Contraception: Vasectomy  
  
  
  
PAST MEDICAL HISTORY  
Diagnosis Date  
Depression  
High cholesterol  
  
PAST SURGICAL HISTORY  
Procedure Laterality Date  
APPENDECTOMY   
COLONOSCOPY SCREENING 2019  
D&C, DIAG AND/OR THERAPEUTIC   
10/24/2024  
hysteroscopy  
EXTRACTION ERUPTED TOOTH/EXR  
unknown date  
LITHOTRIPSY XTRCORP SHOCK WAVE   
2009  
Renal Lithotripsy  
  
FAMILY HISTORY  
Problem Relation Age of Onset  
Depression Mother  
Diabetes Mother  
other (hypercholesterolemia   
[other]) Mother  
other (htn) Mother  
Thyroid Mother  
Thyroid disease  
Alcohol abuse Father  
other (cva) Father  
Ischemic Heart Disease Father  
Diabetes Father  
other (hypercholesterolemia   
[other]) Father  
other (TIA) Father  
Parkinson's Father  
other (hypercholesterolemia   
[other]) Brother  
Brain Cancer Maternal   
Grandmother  
other (cardiac disorder)   
Maternal Grandfather  
other (htn) Maternal   
Grandfather  
other (CVA) Maternal   
Grandfather  
other (Cardiac disorder)   
Paternal Grandfather  
other (htn) Paternal   
Grandfather  
Diabetes Paternal Grandfather  
  
Social History  
  
Tobacco Use  
Smoking status: Never  
Smokeless tobacco: Never  
Vaping Use  
Vaping status: Never Used  
Substance Use Topics  
Alcohol use: Yes  
Comment: rare  
Drug use: Not Currently  
  
Current Outpatient Medications  
Medication Sig  
atorvastatin (LIPITOR) 20 mg   
tablet Take 20 mg by mouth.  
ARIPiprazole (ABILIFY) 2 mg   
tablet Take 2 mg by mouth.  
albuterol HFA (PROVENTIL HFA,   
VENTOLIN HFA) 90 mcg/actuation   
inhaler Inhale 2 puffs every 4   
hours by inhalation route as   
needed.  
sertraline (ZOLOFT) 100 mg   
tablet Take 200 mg by mouth.  
Cetirizine 10 mg cap Take 10 mg   
by mouth. (Patient not taking:   
Reported on 2025)  
  
No current   
facility-administered   
medications for this visit.  
  
Allergies As of Date:   
2025  
Allergen Noted Reaction  
NUT - UNSPECIFIED 2023   
Anaphylaxis  
  
Fully Assessed 2025  
  
REVIEW OF SYSTEMS  
Expanded ROS: GYN: + abnormal   
uterine bleeding  
Allergies and current   
medication updated:Yes  
  
SENSITIVE EXAM: The sensitive   
examination was discussed with   
the Patient or Patient's   
Authorized Representative. As   
applicable, any other   
physician, advance practice   
provider, medical student, or   
other health professional   
student that will be observing   
or involved in the sensitive   
examination for educational or   
training purposes was discussed   
with the Patient or Authorized   
Representative. The Patient or   
Authorized Representative has   
agreed to proceed with the   
sensitive examination.   
(Sensitive examination includes   
inspection and/or palpation of   
the breasts, pelvis, prostate   
and anorectal regions).  
  
EXAM: /70   Wt 246 lb   
(111.6kg)   LMP 09/15/2024  
  
  
GENERAL: pleasant,    
female in no apparent distress  
HEENT: Normocephalic,   
atraumatic, mucus membranes   
moist, and no lesions  
CHEST: Normal inspiratory   
effort  
PELVIC: external genitalia   
normal, normal Bartholin's   
glands, urethra, Faxon's   
glands, no vulvar lesions, no   
cervical lesions, good vaginal   
support, + menses, normal   
appearing perineal body and   
perianal region  
NEURO: alert and oriented   
x3,exam grossly non-focal  
EXTREMITIES: normal  
  
ASSESSMENT AND PLAN:  
1. Abnormal uterine bleeding -   
ICD9: 626.9, ICD10: N93.9  
- Bleeding precautions reviewed   
- to go to ER if lightheaded,   
dizzy, short of breath, or   
experiencing chest pain. CBC   
ordered  
- Aygestin rx sent  
- Discussed Mirena IUD  
- Cassi requests opinion of   
Dr. Ruiz in regard to Mirena   
IUD - will forward chart  
  
Houston Freeman APRN.CNP  
  
Medical Decision Making:  
  
Problems:  
Low: Acute, uncomplicated   
illness or injury  
Data:  
Unique test result(s) reviewed:   
2  
Unique test(s) ordered: 1  
Risk:  
Low: Low risk from   
testing/treatment  
Moderate: Drug management  
  
Medical Decision Making Level:   
4 - Moderate  
  
  
Electronically signed by Houston Freeman APRN.CNP at 2025   
11:17 AM EST  
documented in this encounter            [facility]  
   
                                                    2025 Telephone   
encounter Note                          Formatting of this note might   
be different from the original.  
Patient calling with concerns   
of PMB? Patient is currently   
wearing a Super+ tampon along   
with a pad and is bleeding   
through her tampon and pad   
every hour.  
Bleeding started yesterday   
morning and heavy bleeding   
started at about 2 AM.  
Denies any chest pain,   
dizziness, or SOB. Currently   
having abdominal cramping,   
mostly in her RLQ that she   
rates at a 3 out of 10. Patient   
had a D&C at [facility] on 10/24/24   
for AUB.  
  
Ruby Huynh RN  
  
Electronically signed by   
Ruby Huynh RN at   
2025 8:29 AM EST  
                                        [facility]  
   
                                                    2025 Miscellaneous   
Notes                                   Formatting of this note might   
be different from the original.  
Patient calling with concerns   
of PMB? Patient is currently   
wearing a Super+ tampon along   
with a pad and is bleeding   
through her tampon and pad   
every hour.  
Bleeding started yesterday   
morning and heavy bleeding   
started at about 2 AM.  
Denies any chest pain,   
dizziness, or SOB. Currently   
having abdominal cramping,   
mostly in her RLQ that she   
rates at a 3 out of 10. Patient   
had a D&C at [facility] on 10/24/24   
for AUB.  
  
Ruby Huynh RN  
  
Electronically signed by   
Ruby Huynh RN at   
2025 8:29 AM EST  
documented in this encounter            [facility]  
   
                                                    2024 History of Presen  
t   
illness Narrative                       Formatting of this note is   
different from the original.  
54 y.o. female presents for   
evaluation of URI. Symptoms   
including cough, congestion,   
body aches, malaise, and   
headache have been present for   
7-10 days and refractory to OTC   
meds. No fever, chills, nausea,   
vomiting, abdominal pain, CP,   
or SOB. No exacerbating   
factors. No known COVID 19/flu   
exposure.  
  
Vitals:  
24 0924  
BP: 131/82  
Pulse: 75  
Temp: 36.4 C (97.5 F)  
SpO2: 95%  
  
Allergies  
Allergen Reactions  
Nut - Unspecified Anaphylaxis  
Other Unknown  
  
Medication Documentation Review   
Audit  
  
Reviewed by Liseth Castillo MA   
(Medical Assistant) on 24   
at 0925  
  
Medication Order Taking? Sig   
Documenting Provider Last Dose   
Status  
albuterol 90 mcg/actuation   
inhaler [id number] Yes Inhale 2   
puffs every 4 hours if needed   
for wheezing or shortness of   
breath. Janie Parish, DO   
Taking Active  
ARIPiprazole (Abilify) 2 mg   
tablet [id number] Yes Take 1   
tablet (2 mg) by mouth once   
daily. Janie Parish DO   
Active  
atorvastatin (Lipitor) 20 mg   
tablet [id number] Yes Take 1   
tablet (20 mg) by mouth once   
daily. Janie JON  DO   
Active  
cetirizine (ZYRTEC) 10 mg   
capsule [id number] Yes Take 1   
capsule (10 mg) by mouth.   
Historical Provider, MD Taking   
Active  
sertraline (Zoloft) 100 mg   
tablet [id number] Yes Take 2   
tablets (200 mg) by mouth once   
daily. Janie Parish DO   
Active  
SUMAtriptan (Imitrex) 50 mg   
tablet [id number] Yes Take 1   
tablet (50 mg) by mouth 1 time   
if needed for migraine for up   
to 1 dose. Take 1 tablet for   
migraine relief. May repeat   
every 2 hours. Max 200mg/day   
Janieyudith ParishDO Taking   
Active  
  
  
  
  
  
  
Past Medical History:  
Diagnosis Date  
Elevated alkaline phosphatase   
level 2023  
Encounter for full-term   
uncomplicated delivery  
Normal vaginal delivery  
Encounter for gynecological   
examination (general) (routine)   
without abnormal findings  
Pap test, as part of routine   
gynecological examination  
Menopause 2023  
Menstrual irregularity   
2023  
Neck pain, chronic 2023  
Other conditions influencing   
health status  
Menstruation  
Personal history of other   
medical treatment  
History of mammogram  
  
Past Surgical History:  
Procedure Laterality Date  
OTHER SURGICAL HISTORY   
2019  
[place] tooth extraction  
OTHER SURGICAL HISTORY   
2019  
Colonoscopy  
OTHER SURGICAL HISTORY   
2022  
Appendectomy  
OTHER SURGICAL HISTORY   
2022  
Renal lithotripsy  
  
ROS  
See HPI  
  
Physical Exam  
Vitals and nursing note   
reviewed.  
Constitutional:  
Appearance: Normal appearance.   
She is normal weight.  
HENT:  
Head: Normocephalic and   
atraumatic.  
Right Ear: Tympanic membrane   
and ear canal normal.  
Left Ear: Tympanic membrane and   
ear canal normal.  
Nose: Congestion present.  
Mouth/Throat:  
Mouth: Mucous membranes are   
moist.  
Pharynx: Oropharynx is clear.  
Eyes:  
Extraocular Movements:   
Extraocular movements intact.  
Conjunctiva/sclera:   
Conjunctivae normal.  
Pupils: Pupils are equal,   
round, and reactive to light.  
Cardiovascular:  
Rate and Rhythm: Normal rate.  
Pulmonary:  
Effort: Pulmonary effort is   
normal.  
Breath sounds: Normal breath   
sounds.  
Lymphadenopathy:  
Cervical: Cervical adenopathy   
present.  
Skin:  
General: Skin is warm and dry.  
Neurological:  
General: No focal deficit   
present.  
Mental Status: She is alert and   
oriented to person, place, and   
time.  
Psychiatric:  
Mood and Affect: Mood normal.  
Behavior: Behavior normal.  
  
Assessment/Plan/MDM  
Cassi was seen today for uri.  
Diagnoses and all orders for   
this visit:  
Acute bronchitis, unspecified   
organism (Primary)  
- azithromycin (Zithromax   
Z-Florentin) 250 mg tablet; Take 2   
tablets (500 mg) on Day 1,   
followed by 1 tablet (250 mg)   
once daily on Days 2 through 5.  
- predniSONE (Deltasone) 10 mg   
tablet; Take 3 tablets (30 mg)   
by mouth once daily for 5 days.  
- albuterol 90 mcg/actuation   
inhaler; Inhale 2 puffs every 6   
hours if needed for wheezing.  
- inhalational spacing device   
(Aerochamber MV) inhaler; Use   
as instructed  
  
Encouraged pt to use otc cold   
remedies PRN, push PO fluids   
and rest. Patient's clinical   
presentation is otherwise   
unremarkable at this time.   
Patient is discharged with   
instructions to follow-up with   
primary care or seek emergency   
medical attention for worsening   
symptoms or any new concerns.  
  
I did personally review   
Cassi's past medical history,   
surgical history, social   
history, as well as family   
history (when relevant). In   
this case, I also oversaw the   
her drug management by   
reviewing her medication list,   
allergy list, as well as the   
medications that I prescribed   
during the UC course and/or   
recommended as an out-patient   
(including possible OTC   
medications such as   
acetaminophen, NSAIDs , etc).  
  
After reviewing the items   
above, I did look at previous   
medical documentation, such as   
recent hospitalizations, office   
visits, and/or recent   
consultations with   
PCP/specialist.  
  
SDOH: Another factor that I   
considered in Cassi's care was   
her Social Determinants of   
Health (SDOH). During this UC   
encounter, she did not have   
social determinants of health.   
Those SDOH influencing Cassi's   
care are: none  
  
Kevyn Rosen CNP  
[facility]  
[phone]  
Electronically signed by KEVIN Barker at   
2024 9:44 AM EDT  
documented in this encounter            [facility]  
Work Phone:   
[phone]  
   
                                        10- Note     HNO ID: [id number]  
Author: DEVON RUIZ MD  
Service: ?  
Author Type: Physician  
Type: Progress Notes  
Filed: 10/24/2024 18:06  
Note Text:  
Patient underwent hysteroscopy   
DANDC for abnormal uterine   
bleeding on 10/24/2024  
at [facility].   
Her FSH level and estrogen   
level suggest she is  
perimenopausal not fully   
menopausal. Pelvic ultrasound   
did not reveal any  
ovarian tumors. Patient does   
not have hyper androgen   
symptoms. No discrete  
polyps or masses were noted in   
the uterus. Pathology is   
pending. Devon Ruiz MD                             [facility]  
   
                                                    10- History of Presen  
t   
illness Narrative                       Formatting of this note might   
be different from the original.  
Patient underwent hysteroscopy   
D&C for abnormal uterine   
bleeding on 10/24/2024 at   
[facility]. Her   
FSH level and estrogen level   
suggest she is perimenopausal   
not fully menopausal. Pelvic   
ultrasound did not reveal any   
ovarian tumors. Patient does   
not have hyper androgen   
symptoms. No discrete polyps or   
masses were noted in the   
uterus. Pathology is pending.   
Devon Ruiz MD  
Electronically signed by   
Devon Ruiz MD at   
10/24/2024 6:06 PM EDT  
documented in this encounter            [facility]  
   
                                                    10- Telephone   
encounter Note                          Formatting of this note might   
be different from the original.  
Orders refaxed to [facility]. Mala Royal RN  
  
Electronically signed by   
Mala Royal RN at   
10/18/2024 2:55 PM EDT  
                                        [facility]  
   
                                                    10- Miscellaneous   
Notes                                   Formatting of this note might   
be different from the original.  
Orders refaxed to [facility]. Mala Royal RN  
  
Electronically signed by   
Mala Royal RN at   
10/18/2024 2:55 PM EDT  
Formatting of this note might   
be different from the original.  
PAT called. Does patient need   
an antibiotic for surgery?  No   
antibiotic  was not checked   
either. Thank you.  
Mala Royal RN  
  
Electronically signed by   
Mala Royal RN at   
10/17/2024 2:00 PM EDT  
documented in this encounter            [facility]  
   
                                                    10- Telephone   
encounter Note                          Formatting of this note might   
be different from the original.  
PAT called. Does patient need   
an antibiotic for surgery?  No   
antibiotic  was not checked   
either. Thank you.  
Mala Royal, RN  
  
Electronically signed by   
Mala Royal RN at   
10/17/2024 2:00 PM EDT  
                                        [facility]  
   
                                                    10- History and   
physical note                           Formatting of this note is   
different from the original.  
Pre-Op History and Physical  
  
HPI: The patient is a 54 year   
old female presenting for   
pre-operative visit.  
She is scheduled for   
Hysteroscopy D&C, for AUB  
on 10/24/24. Procedure   
discussed along with risks,   
benefits and complications.   
Other  
alternatives discussed for   
management. Consent form   
signed? Yes.  
  
PAST MEDICAL HISTORY  
Diagnosis Date  
Depression  
High cholesterol  
  
  
PAST SURGICAL HISTORY  
Procedure Laterality Date  
APPENDECTOMY   
COLONOSCOPY SCREENING   
EXTRACTION ERUPTED TOOTH/EXR  
unknown date  
LITHOTRIPSY XTRCORP SHOCK WAVE   
  
Renal Lithotripsy  
  
Current Outpatient Medications  
Medication Sig Dispense Refill  
atorvastatin (LIPITOR) 20 mg   
tablet Take 20 mg by mouth.  
ARIPiprazole (ABILIFY) 2 mg   
tablet Take 2 mg by mouth.  
albuterol HFA (PROVENTIL HFA,   
VENTOLIN HFA) 90 mcg/actuation   
inhaler Inhale 2 puffs every 4   
hours by inhalation route as   
needed.  
sertraline (ZOLOFT) 100 mg   
tablet Take 200 mg by mouth.  
Cetirizine 10 mg cap Take 10 mg   
by mouth.  
  
No current   
facility-administered   
medications for this visit.  
  
ALLERGIES: Patient has no   
allergy information on record.  
  
PERSONAL HISTORY:  
Social History  
  
Tobacco Use  
Smoking status: Never  
Smokeless tobacco: Never  
Vaping Use  
Vaping status: Never Used  
Substance Use Topics  
Alcohol use: Yes  
Comment: rare  
Drug use: Not Currently  
  
  
FAMILY HISTORY:  
FAMILY HISTORY  
Problem Relation Age of Onset  
Depression Mother  
Diabetes Mother  
other (hypercholesterolemia   
[other]) Mother  
other (htn) Mother  
Thyroid Mother  
Thyroid disease  
Alcohol abuse Father  
other (cva) Father  
Ischemic Heart Disease Father  
Diabetes Father  
other (hypercholesterolemia   
[other]) Father  
other (TIA) Father  
Parkinson's Father  
other (hypercholesterolemia   
[other]) Brother  
Brain Cancer Maternal   
Grandmother  
other (cardiac disorder)   
Maternal Grandfather  
other (htn) Maternal   
Grandfather  
other (CVA) Maternal   
Grandfather  
other (Cardiac disorder)   
Paternal Grandfather  
other (htn) Paternal   
Grandfather  
Diabetes Paternal Grandfather  
  
REVIEW OF SYMPTOMS:  
GENERAL: denies fevers or   
chills  
ENDOCRINOLOGY: has not been on   
steroids  
Cardiology : denies   
palpitations or chest pain  
Respiratory: denies SOB or   
cough  
Hematology: denies history of   
prolonged bleeding or easy   
bruising or VTE  
Allergy: Denies history of   
personal or family history of   
allergy to anesthesia  
  
  
PHYSICAL EXAMINATION:  
  
VITALS: Last menstrual period   
09/15/2024.  
  
GENERAL: The patient is well   
nourished, well hydrated in no   
acute distress. , The patient   
is oriented to time, place, and   
person.  
WET PREP: Not indicated  
  
IMPRESSION: PMB, thickened   
endometrium  
  
PLAN: The   
risks/benefits/alternatives and   
personal involved for the   
planned hysteroscopy D&C were   
reviewed with the patient. Her   
questions were answered to her   
satisfaction and she desires to   
proceed. Consent was signed. I   
reviewed with her postop   
instructions and expectations.  
  
I have reviewed and updated   
past medical and surgical   
history, medications and   
allergies  
Devon Ruiz M.D.  
Electronically signed by   
Devon Ruiz MD at   
10/16/2024 5:09 PM EDT  
                                        [facility]  
   
                                                    10- History and   
physical note                           Formatting of this note is   
different from the original.  
Pre-Op History and Physical  
  
HPI: The patient is a 54 year   
old female presenting for   
pre-operative visit.  
She is scheduled for   
Hysteroscopy D&C, for AUB  
on 10/24/24. Procedure   
discussed along with risks,   
benefits and complications.   
Other  
alternatives discussed for   
management. Consent form   
signed? Yes.  
  
PAST MEDICAL HISTORY  
Diagnosis Date  
Depression  
High cholesterol  
  
  
PAST SURGICAL HISTORY  
Procedure Laterality Date  
APPENDECTOMY   
COLONOSCOPY SCREENING   
EXTRACTION ERUPTED TOOTH/EXR  
unknown date  
LITHOTRIPSY XTRCORP SHOCK WAVE   
  
Renal Lithotripsy  
  
Current Outpatient Medications  
Medication Sig Dispense Refill  
atorvastatin (LIPITOR) 20 mg   
tablet Take 20 mg by mouth.  
ARIPiprazole (ABILIFY) 2 mg   
tablet Take 2 mg by mouth.  
albuterol HFA (PROVENTIL HFA,   
VENTOLIN HFA) 90 mcg/actuation   
inhaler Inhale 2 puffs every 4   
hours by inhalation route as   
needed.  
sertraline (ZOLOFT) 100 mg   
tablet Take 200 mg by mouth.  
Cetirizine 10 mg cap Take 10 mg   
by mouth.  
  
No current   
facility-administered   
medications for this visit.  
  
ALLERGIES: Patient has no   
allergy information on record.  
  
PERSONAL HISTORY:  
Social History  
  
Tobacco Use  
Smoking status: Never  
Smokeless tobacco: Never  
Vaping Use  
Vaping status: Never Used  
Substance Use Topics  
Alcohol use: Yes  
Comment: rare  
Drug use: Not Currently  
  
  
FAMILY HISTORY:  
FAMILY HISTORY  
Problem Relation Age of Onset  
Depression Mother  
Diabetes Mother  
other (hypercholesterolemia   
[other]) Mother  
other (htn) Mother  
Thyroid Mother  
Thyroid disease  
Alcohol abuse Father  
other (cva) Father  
Ischemic Heart Disease Father  
Diabetes Father  
other (hypercholesterolemia   
[other]) Father  
other (TIA) Father  
Parkinson's Father  
other (hypercholesterolemia   
[other]) Brother  
Brain Cancer Maternal   
Grandmother  
other (cardiac disorder)   
Maternal Grandfather  
other (htn) Maternal   
Grandfather  
other (CVA) Maternal   
Grandfather  
other (Cardiac disorder)   
Paternal Grandfather  
other (htn) Paternal   
Grandfather  
Diabetes Paternal Grandfather  
  
REVIEW OF SYMPTOMS:  
GENERAL: denies fevers or   
chills  
ENDOCRINOLOGY: has not been on   
steroids  
Cardiology : denies   
palpitations or chest pain  
Respiratory: denies SOB or   
cough  
Hematology: denies history of   
prolonged bleeding or easy   
bruising or VTE  
Allergy: Denies history of   
personal or family history of   
allergy to anesthesia  
  
  
PHYSICAL EXAMINATION:  
  
VITALS: Last menstrual period   
09/15/2024.  
  
GENERAL: The patient is well   
nourished, well hydrated in no   
acute distress. , The patient   
is oriented to time, place, and   
person.  
WET PREP: Not indicated  
  
IMPRESSION: PMB, thickened   
endometrium  
  
PLAN: The   
risks/benefits/alternatives and   
personal involved for the   
planned hysteroscopy D&C were   
reviewed with the patient. Her   
questions were answered to her   
satisfaction and she desires to   
proceed. Consent was signed. I   
reviewed with her postop   
instructions and expectations.  
  
I have reviewed and updated   
past medical and surgical   
history, medications and   
allergies  
Devon Ruiz M.D.  
Electronically signed by   
Devon Ruiz MD at   
10/16/2024 5:09 PM EDT  
documented in this encounter            [facility]  
   
                                        10- Note     HNO ID: [id number]  
Author: DEVON RUIZ MD  
Service: ?  
Author Type: Physician  
Type: Progress Notes  
Filed: 10/16/2024 17:09  
Note Text:  
VIRTUAL VISIT PROGRESS NOTE  
This is a virtual visit using   
[employer] Video Visit. It   
required  
patient-provider interaction   
for the medical decision making   
as documented  
below.  
I have communicated my name and   
active licensure. The patient's   
identity and  
physical location were verified   
at the time of this visit.   
Either the patient or  
their legal representative has   
been informed of the risks and   
benefits of -- and  
alternatives to -- treatment   
through a remote evaluation and   
consents to proceed  
with the evaluation remotely.  
Cassi DeL a Torre is a 54 year   
old female seen for f/u pelvic   
US. No bleeding  
since last visit. Had 2   
episodes of bleeding this year.   
no new c/o today..  
HISTORY REVIEWED (electronic   
chart updated):  
PAST MEDICAL HISTORY  
Diagnosis Date  
Depression  
High cholesterol  
PAST SURGICAL HISTORY  
Procedure Laterality Date  
APPENDECTOMY   
COLONOSCOPY SCREENING   
EXTRACTION ERUPTED TOOTH/EXR  
unknown date  
LITHOTRIPSY XTRCORP SHOCK WAVE   
2009  
Renal Lithotripsy  
FAMILY HISTORY  
Problem Relation Age of Onset  
Depression Mother  
Diabetes Mother  
other (hypercholesterolemia   
[other]) Mother  
other (htn) Mother  
Thyroid Mother  
Thyroid disease  
Alcohol abuse Father  
other (cva) Father  
Ischemic Heart Disease Father  
Diabetes Father  
other (hypercholesterolemia   
[other]) Father  
other (TIA) Father  
Parkinson's Father  
other (hypercholesterolemia   
[other]) Brother  
Brain Cancer Maternal   
Grandmother  
other (cardiac disorder)   
Maternal Grandfather  
other (htn) Maternal   
Grandfather  
other (CVA) Maternal   
Grandfather  
other (Cardiac disorder)   
Paternal Grandfather  
other (htn) Paternal   
Grandfather  
Diabetes Paternal Grandfather  
Social History  
Tobacco Use  
Smoking status: Never  
Smokeless tobacco: Never  
Vaping Use  
Vaping status: Never Used  
Substance Use Topics  
Alcohol use: Yes  
Comment: rare  
Drug use: Not Currently  
Current Outpatient Medications  
Medication Sig  
atorvastatin (LIPITOR) 20 mg   
tablet Take 20 mg by mouth.  
ARIPiprazole (ABILIFY) 2 mg   
tablet Take 2 mg by mouth.  
albuterol HFA (PROVENTIL HFA,   
VENTOLIN HFA) 90 mcg/actuation   
inhaler Inhale 2  
puffs every 4 hours by   
inhalation route as needed.  
sertraline (ZOLOFT) 100 mg   
tablet Take 200 mg by mouth.  
Cetirizine 10 mg cap Take 10 mg   
by mouth.  
No current   
facility-administered   
medications for this visit.  
ALLERGIES  
Not on File  
REVIEW OF SYSTEMS:  
GENERAL: feeling well without   
fatigue, no recent change in   
weight  
PHYSICAL EXAMINATION:  
VIDEO EXAM: (if completed,   
performed via video enabled   
technology)  
GENERAL: alert and appropriate,   
in no distress, well-hydrated,   
well nourished,  
and happy, smiling, interactive  
ASSESSMENT:  
PMB vs AUB, endometrium 7.6,   
thickened for postmenopaul  
PLAN:  
Reviewed pelvic ultrasound, Pap   
and HPV with patient. Risk   
benefits and  
alternatives to hysteroscopy   
DANDC were discussed with   
patient, questions were  
answered to her satisfaction   
she desires to proceed.   
Decision for surgery  
today. Will check FSH levels   
day of surgery. Highly suspect   
patient is  
postmenopausal.  
There are no Patient   
Instructions on file for this   
visit.  
Devon Ruiz MD                   [facility]  
   
                                                    10- History of Presen  
t   
illness Narrative                       Formatting of this note is   
different from the original.  
VIRTUAL VISIT PROGRESS NOTE  
  
This is a virtual visit using   
[employer] Zoom Video Visit. It   
required patient-provider   
interaction for the medical   
decision making as documented   
below.  
  
I have communicated my name and   
active licensure. The patient's   
identity and physical location   
were verified at the time of   
this visit. Either the patient   
or their legal representative   
has been informed of the risks   
and benefits of -- and   
alternatives to -- treatment   
through a remote evaluation and   
consents to proceed with the   
evaluation remotely.  
  
Cassi De La Torre is a 54 year   
old female seen for f/u pelvic   
US. No bleeding since last   
visit. Had 2 episodes of   
bleeding this year. no new c/o   
today..  
  
HISTORY REVIEWED (electronic   
chart updated):  
PAST MEDICAL HISTORY  
Diagnosis Date  
Depression  
High cholesterol  
  
PAST SURGICAL HISTORY  
Procedure Laterality Date  
APPENDECTOMY   
COLONOSCOPY SCREENING   
EXTRACTION ERUPTED TOOTH/EXR  
unknown date  
LITHOTRIPSY XTRCORP SHOCK WAVE   
  
Renal Lithotripsy  
  
FAMILY HISTORY  
Problem Relation Age of Onset  
Depression Mother  
Diabetes Mother  
other (hypercholesterolemia   
[other]) Mother  
other (htn) Mother  
Thyroid Mother  
Thyroid disease  
Alcohol abuse Father  
other (cva) Father  
Ischemic Heart Disease Father  
Diabetes Father  
other (hypercholesterolemia   
[other]) Father  
other (TIA) Father  
Parkinson's Father  
other (hypercholesterolemia   
[other]) Brother  
Brain Cancer Maternal   
Grandmother  
other (cardiac disorder)   
Maternal Grandfather  
other (htn) Maternal   
Grandfather  
other (CVA) Maternal   
Grandfather  
other (Cardiac disorder)   
Paternal Grandfather  
other (htn) Paternal   
Grandfather  
Diabetes Paternal Grandfather  
  
Social History  
  
Tobacco Use  
Smoking status: Never  
Smokeless tobacco: Never  
Vaping Use  
Vaping status: Never Used  
Substance Use Topics  
Alcohol use: Yes  
Comment: rare  
Drug use: Not Currently  
  
Current Outpatient Medications  
Medication Sig  
atorvastatin (LIPITOR) 20 mg   
tablet Take 20 mg by mouth.  
ARIPiprazole (ABILIFY) 2 mg   
tablet Take 2 mg by mouth.  
albuterol HFA (PROVENTIL HFA,   
VENTOLIN HFA) 90 mcg/actuation   
inhaler Inhale 2 puffs every 4   
hours by inhalation route as   
needed.  
sertraline (ZOLOFT) 100 mg   
tablet Take 200 mg by mouth.  
Cetirizine 10 mg cap Take 10 mg   
by mouth.  
  
No current   
facility-administered   
medications for this visit.  
  
ALLERGIES  
Not on File  
  
REVIEW OF SYSTEMS:  
GENERAL: feeling well without   
fatigue, no recent change in   
weight  
  
PHYSICAL EXAMINATION:  
  
VIDEO EXAM: (if completed,   
performed via video enabled   
technology)  
GENERAL: alert and appropriate,   
in no distress, well-hydrated,   
well nourished, and happy,   
smiling, interactive  
  
ASSESSMENT:  
PMB vs AUB, endometrium 7.6,   
thickened for postmenopaul  
  
PLAN:  
Reviewed pelvic ultrasound, Pap   
and HPV with patient. Risk   
benefits and alternatives to   
hysteroscopy D&C were discussed   
with patient, questions were   
answered to her satisfaction   
she desires to proceed.   
Decision for surgery today.   
Will check FSH levels day of   
surgery. Highly suspect patient   
is postmenopausal.  
  
There are no Patient   
Instructions on file for this   
visit.  
  
Devon Ruiz MD  
  
Electronically signed by   
Devon Ruiz MD at   
10/16/2024 5:09 PM EDT  
documented in this encounter            [facility]  
   
                                                    10- Telephone   
encounter Note                          Formatting of this note might   
be different from the original.  
Pt notified and pre-op appt   
already scheduled virtually   
10/16 and surgery scheduled at   
[facility] 10/24/24. Surgery scheduler   
has surgery paper. Latanya Soto RN  
  
Electronically signed by   
Latanya Soto RN at 10/09/2024   
9:24 AM EDT  
                                        [facility]  
   
                                                    10- Miscellaneous   
Notes                                   Formatting of this note might   
be different from the original.  
Pt notified and pre-op appt   
already scheduled virtually   
10/16 and surgery scheduled at   
[facility] 10/24/24. Surgery scheduler   
has surgery paper. Latanya Soto RN  
  
Electronically signed by   
Latanya Soto RN at 10/09/2024   
9:24 AM EDT  
Formatting of this note might   
be different from the original.  
Left message for patient to   
call office.  
Latanya Soto RN  
  
Electronically signed by   
Latanya Soto RN at 10/09/2024   
9:15 AM EDT  
Formatting of this note might   
be different from the original.  
----- Message from Devon Ruiz MD sent at 10/8/2024   
6:26 PM EDT -----  
See if she wants to schedule   
hysteroscopy D&C and if has   
constraints w/ timing/location.   
need surgery sheet. If wants   
f/u to discuss first ok for   
virtual. Devon Ruiz MD  
Electronically signed by   
Latanya Soto, RN at 10/09/2024   
9:14 AM EDT  
documented in this encounter            [facility]  
   
                                                    10- Telephone   
encounter Note                          Formatting of this note might   
be different from the original.  
Left message for patient to   
call office.  
Latanya Soto RN  
  
Electronically signed by   
Latanya Soto RN at 10/09/2024   
9:15 AM EDT  
                                        [facility]  
   
                                                    10- Telephone   
encounter Note                          Formatting of this note might   
be different from the original.  
----- Message from Devon Ruiz MD sent at 10/8/2024   
6:26 PM EDT -----  
See if she wants to schedule   
hysteroscopy D&C and if has   
constraints w/ timing/location.   
need surgery sheet. If wants   
f/u to discuss first ok for   
virtual. Devon Ruiz MD  
Electronically signed by   
Latanya Soto RN at 10/09/2024   
9:14 AM EDT  
                                        [facility]  
   
                                        10- Note     HNO ID: [id number]  
Author: MARIANELA CORTEZ MD  
Service: ?  
Author Type: Physician  
Type: Progress Notes  
Filed: 10/03/2024 21:12  
Note Text:  
Cassi De La Torre is a 54 year   
old female who presented for   
gyn ultrasound  
today.  
Encounter Diagnosis  
ICD-10-CM  
1. PMB (postmenopausal   
bleeding) N95.0  
2. Intramural leiomyoma of   
uterus D25.1  
Please see report under imaging   
tab.  
Marianela Cortez MD  
October 3, 2024  
9:01 PM                                 [facility]  
   
                                                    10- History of Presen  
t   
illness Narrative                       Formatting of this note is   
different from the original.  
Cassi De La Torre is a 54 year   
old female who presented for   
gyn ultrasound today.  
  
Encounter Diagnosis  
ICD-10-CM  
1. PMB (postmenopausal   
bleeding) N95.0  
  
2. Intramural leiomyoma of   
uterus D25.1  
  
  
Please see report under imaging   
tab.  
  
Marianela Cortez MD  
October 3, 2024  
9:01 PM  
Electronically signed by   
Marianela Cortez MD at   
10/03/2024 9:12 PM EDT  
documented in this encounter            [facility]  
   
                                        2024 Note     HNO ID: [id number]  
Author: DEVON RUIZ MD  
Service: ?  
Author Type: Physician  
Type: Progress Notes  
Filed: 2024 15:39  
Note Text:  
Cassi is a 54 year old    
who presents for an annual   
gynecologic exam with  
complaints, irregular bleeding.   
Has a h/o of AUB and was placed   
on birth  
control about 2 years ago for a   
year. Only had one menses on   
that. Has had 2  
epidodes of heav bleeding 4-5   
days this past year, fe and   
recently. Some pain  
w/ recent one on right side. .   
Had a pelvic US last year and   
thickened  
endometrium and had EMB which   
showed weakly proliferative   
endometrium w/ tubal  
metaplasia. FSH levels are   
incrased now, but still low 2   
years ago. Results  
from Aug 2023 FSH 28. and LH   
20.6  
No signif hot flashes or   
vaginal dryness.  
Postmenopausal: uncertain  
HRT use: No.  
Last Pap: normal  
HPV: ncertain  
History of abnormal pap: No  
Last mammogram:  normal  
History of abnormal mammogram:   
No  
Sexually active: Yes  
OB History  
 T2  L2  
SAB0 IAB0 Ectopic0 Multiple0   
Live Births0  
Gyn History  
LMP: 09/15/2024 (Approximate),   
Having periods  
Age at Menarche:  
Age at First Pregnancy:  
Age at Menopause:  
Gyn History Comments:  
Sexual Activity: Yes; Male  
Contraception: Vasectomy  
PAST MEDICAL HISTORY  
Diagnosis Date  
Depression  
High cholesterol  
PAST SURGICAL HISTORY  
Procedure Laterality Date  
APPENDECTOMY   
COLONOSCOPY SCREENING 2019  
EXTRACTION ERUPTED TOOTH/EXR  
unknown date  
LITHOTRIPSY XTRCORP SHOCK WAVE   
2009  
Renal Lithotripsy  
FAMILY HISTORY  
Problem Relation Age of Onset  
Depression Mother  
Diabetes Mother  
other (hypercholesterolemia   
[other]) Mother  
other (htn) Mother  
Thyroid Mother  
Thyroid disease  
Alcohol abuse Father  
other (cva) Father  
Ischemic Heart Disease Father  
Diabetes Father  
other (hypercholesterolemia   
[other]) Father  
other (TIA) Father  
Parkinson's Father  
other (hypercholesterolemia   
[other]) Brother  
Brain Cancer Maternal   
Grandmother  
other (cardiac disorder)   
Maternal Grandfather  
other (htn) Maternal   
Grandfather  
other (CVA) Maternal   
Grandfather  
other (Cardiac disorder)   
Paternal Grandfather  
other (htn) Paternal   
Grandfather  
Diabetes Paternal Grandfather  
SOCIAL HISTORY  
Social History  
Tobacco Use  
Smoking status: Never  
Smokeless tobacco: Never  
Vaping Use  
Vaping status: Never Used  
Substance Use Topics  
Alcohol use: Yes  
Comment: rare  
Drug use: Not Currently  
REVIEW OF SYSTEMS  
Abdomen: No abdominal pain,   
nausea, vomiting, diarrhea, or   
constipation. No  
bloating, early satiety,   
indigestion, or increased   
flatulence.  
Bladder: No dysuria, gross   
hematuria, urinary frequency,   
urinary urgency, or  
incontinence  
Breast: No breast lumps, nipple   
d/c, overlying skin changes,   
redness or skin  
retraction  
Allergies and current   
medication updated:Yes  
SENSITIVE EXAM: The sensitive   
examination was discussed with   
the Patient or  
Patient's Authorized   
Representative. As applicable,   
any other physician,  
advance practice provider,   
medical student, or other   
health professional student  
that will be observing or   
involved in the sensitive   
examination for educational  
or training purposes was   
discussed with the Patient or   
Authorized  
Representative. The Patient or   
Authorized Representative has   
agreed to proceed  
with the sensitive examination.   
(Sensitive examination includes   
inspection  
and/or palpation of the   
breasts, pelvis, prostate and   
anorectal regions).  
EXAM: /78   Ht 5' 3    
(1.60m)   Wt 233 lb (105.7kg)     
LMP 09/15/2024   BMI  
41.28 kg/(m2).  
GENERAL: pleasant,    
female in no apparent distress  
HEENT: Normocephalic,   
atraumatic, mucus membranes   
moist, and no lesions  
NECK: Supple, full range of   
motion, no adenopathy, and   
thyroid normal  
DERMATOLOGY: Normal, without   
lesions, non-icteric, and   
non-hirsute  
BREAST: soft, non-tender,   
symmetric, no dominant mass,   
normal nipple-areolar  
complex, no lymphadenopathy,   
and no nipple discharge  
CHEST: Normal inspiratory   
effort  
ABDOMEN: soft, non-tender, and   
no masses  
PELVIC: external genitalia   
normal, normal Bartholin's   
glands, urethra, Faxon's  
glands, no vulvar lesions, no   
cervical lesions, good vaginal   
support,  
physiologic discharge present,   
normal appearing perineal body   
and perianal  
region  
BIMANUAL: uterus normal size,   
shape and consistency, no   
adnexal masses, and  
non-tender  
RECTOVAGINAL: deferred.  
NEURO: alert and oriented   
x3,exam grossly non-focal  
EXTREMITIES: normal  
ASSESSMENT/PLAN:  
1) Health maintenance:  
Pap done with HPV.  
Mammogram ordered  
Colon cancer screening: has   
ordered  
AUB, likely postmenopausal, h/o   
transmural/uterine fibroid,   
tubal metaplasia on  
EMB. Recommend repeat US and   
hysteroscopy DANDC for further   
eval, agrees w/ this  
plan  
2) Follow up one year or sooner   
as needed  
Devon Ruiz MD                   [facility]  
   
                                                    2024 History of Presen  
t   
illness Narrative                       Formatting of this note is   
different from the original.  
Cassi is a 54 year old    
who presents for an annual   
gynecologic exam with   
complaints, irregular bleeding.   
Has a h/o of AUB and was placed   
on birth control about 2 years   
ago for a year. Only had one   
menses on that. Has had 2   
epidodes of heav bleeding 4-5   
days this past year, fe and   
recently. Some pain w/ recent   
one on right side. . Had a   
pelvic US last year and   
thickened endometrium and had   
EMB which showed weakly   
proliferative endometrium w/   
tubal metaplasia. FSH levels   
are incrased now, but still low   
2 years ago. Results from Aug   
2023 FSH 28. and LH 20.6  
  
No signif hot flashes or   
vaginal dryness.  
  
Postmenopausal: uncertain  
HRT use: No.  
Last Pap: normal  
HPV: ncertain  
History of abnormal pap: No  
Last mammogram:  normal  
History of abnormal mammogram:   
No  
Sexually active: Yes  
  
OB History  
 T2  L2  
SAB0 IAB0 Ectopic0 Multiple0   
Live Births0  
  
Gyn History  
  
LMP: 09/15/2024 (Approximate),   
Having periods  
Age at Menarche:  
Age at First Pregnancy:  
Age at Menopause:  
Gyn History Comments:  
Sexual Activity: Yes; Male  
Contraception: Vasectomy  
  
  
  
PAST MEDICAL HISTORY  
Diagnosis Date  
Depression  
High cholesterol  
  
PAST SURGICAL HISTORY  
Procedure Laterality Date  
APPENDECTOMY   
COLONOSCOPY SCREENING   
EXTRACTION ERUPTED TOOTH/EXR  
unknown date  
LITHOTRIPSY XTRCORP SHOCK WAVE   
2009  
Renal Lithotripsy  
  
FAMILY HISTORY  
Problem Relation Age of Onset  
Depression Mother  
Diabetes Mother  
other (hypercholesterolemia   
[other]) Mother  
other (htn) Mother  
Thyroid Mother  
Thyroid disease  
Alcohol abuse Father  
other (cva) Father  
Ischemic Heart Disease Father  
Diabetes Father  
other (hypercholesterolemia   
[other]) Father  
other (TIA) Father  
Parkinson's Father  
other (hypercholesterolemia   
[other]) Brother  
Brain Cancer Maternal   
Grandmother  
other (cardiac disorder)   
Maternal Grandfather  
other (htn) Maternal   
Grandfather  
other (CVA) Maternal   
Grandfather  
other (Cardiac disorder)   
Paternal Grandfather  
other (htn) Paternal   
Grandfather  
Diabetes Paternal Grandfather  
SOCIAL HISTORY  
Social History  
  
Tobacco Use  
Smoking status: Never  
Smokeless tobacco: Never  
Vaping Use  
Vaping status: Never Used  
Substance Use Topics  
Alcohol use: Yes  
Comment: rare  
Drug use: Not Currently  
  
REVIEW OF SYSTEMS  
Abdomen: No abdominal pain,   
nausea, vomiting, diarrhea, or   
constipation. No bloating,   
early satiety, indigestion, or   
increased flatulence.  
Bladder: No dysuria, gross   
hematuria, urinary frequency,   
urinary urgency, or   
incontinence  
Breast: No breast lumps, nipple   
d/c, overlying skin changes,   
redness or skin retraction  
Allergies and current   
medication updated:Yes  
  
SENSITIVE EXAM: The sensitive   
examination was discussed with   
the Patient or Patient's   
Authorized Representative. As   
applicable, any other   
physician, advance practice   
provider, medical student, or   
other health professional   
student that will be observing   
or involved in the sensitive   
examination for educational or   
training purposes was discussed   
with the Patient or Authorized   
Representative. The Patient or   
Authorized Representative has   
agreed to proceed with the   
sensitive examination.   
(Sensitive examination includes   
inspection and/or palpation of   
the breasts, pelvis, prostate   
and anorectal regions).  
  
EXAM: /78   Ht 5' 3    
(1.60m)   Wt 233 lb (105.7kg)     
LMP 09/15/2024   BMI 41.28   
kg/(m^2).  
  
  
GENERAL: pleasant,    
female in no apparent distress  
HEENT: Normocephalic,   
atraumatic, mucus membranes   
moist, and no lesions  
NECK: Supple, full range of   
motion, no adenopathy, and   
thyroid normal  
DERMATOLOGY: Normal, without   
lesions, non-icteric, and   
non-hirsute  
BREAST: soft, non-tender,   
symmetric, no dominant mass,   
normal nipple-areolar complex,   
no lymphadenopathy, and no   
nipple discharge  
CHEST: Normal inspiratory   
effort  
ABDOMEN: soft, non-tender, and   
no masses  
PELVIC: external genitalia   
normal, normal Bartholin's   
glands, urethra, Faxon's   
glands, no vulvar lesions, no   
cervical lesions, good vaginal   
support, physiologic discharge   
present, normal appearing   
perineal body and perianal   
region  
BIMANUAL: uterus normal size,   
shape and consistency, no   
adnexal masses, and non-tender  
RECTOVAGINAL: deferred.  
NEURO: alert and oriented   
x3,exam grossly non-focal  
EXTREMITIES: normal  
  
ASSESSMENT/PLAN:  
1) Health maintenance:  
Pap done with HPV.  
Mammogram ordered  
Colon cancer screening: has   
ordered  
AUB, likely postmenopausal, h/o   
transmural/uterine fibroid,   
tubal metaplasia on EMB.   
Recommend repeat US and   
hysteroscopy D&C for further   
eval, agrees w/ this plan  
2) Follow up one year or sooner   
as needed  
  
Devon Ruiz MD  
Electronically signed by   
Devon Ruiz MD at   
2024 3:39 PM EDT  
documented in this encounter            [facility]  
   
                                                    2024 Telephone   
encounter Note                          Formatting of this note might   
be different from the original.  
Medical records received from   
[facility]. Pt   
has upcoming new Pt appt on   
24 w/ RR. Records placed   
with chart prep.Some health   
information/history updated in   
Pt's chart with what records we   
received. Latanya Soto RN  
  
Electronically signed by   
Latanya Soto RN at 2024   
1:40 PM EDT  
                                        [facility]  
   
                                                    2024 Miscellaneous   
Notes                                   Formatting of this note might   
be different from the original.  
Medical records received from   
[facility]. Pt   
has upcoming new Pt appt on   
24 w/ RR. Records placed   
with chart prep.Some health   
information/history updated in   
Pt's chart with what records we   
received. Latanya Soto RN  
  
Electronically signed by   
Latanya Soto RN at 2024   
1:40 PM EDT  
documented in this encounter            [facility]  
   
                                        2024 Note     JESSICA Tyler  
PM  
Patient Name: Cassi De La Torre.  
MRN: [id number].  
YOB: 1970, 54   
y.o..  
Gender: female.  
Subjective:  
Patient is a pleasant   
54-year-old female who presents   
to clinic for follow-up  
evaluation guarding her right   
Achilles tendinitis. Patient   
states that she has  
been stretching, icing, taking   
meloxicam and has been doing   
overall very well.  
Most recently, patient tripped   
on  and felt a pop to the   
back of the ankle.  
She felt that she has regressed   
and became concerned. No other   
pedal complaints  
at this time. Denies fevers,   
chills, nausea, vomiting, chest   
pain, shortness of  
breath, or any other   
constitutional symptoms.  
Past Medical History:  
Diagnosis Date  
Depression  
High cholesterol  
Past Surgical History:  
Procedure Laterality Date  
APPENDECTOMY  
Social History  
Socioeconomic History  
Marital status:   
Tobacco Use  
Smoking status: Never  
Smokeless tobacco: Never  
Vaping Use  
Vaping status: Never Used  
Substance and Sexual Activity  
Alcohol use: Yes  
Comment: rarely  
Drug use: Never  
Physical Examination:  
/71 (BP Location: Left   
arm, Patient Position: Sitting,   
BP Cuff Size:  
Adult)   Pulse 76   Temp 98.2   
degrees F (36.8 degrees C)   
(Temporal)   LMP 2024  
(Approximate)  
General Appearance: Alert,   
cooperative, no distress,   
appears stated age.  
Podiatric Exam  
Vascular: DP and PT pulses are   
palpable 2/4. Capillary refill   
time is less than  
3 secs to distal digits. Skin   
temperature is warm to warm   
from proximal tibial  
tuberosity to distal digit. No   
appreciable edema to bilateral   
foot or ankle.  
Neurological: Gross sensation   
is intact. Protective sensation   
is intact.  
Dermatologic: No open wounds or   
ulcerations. No palpable dell.   
Interdigital  
spaces are clean dry and   
intact.  
Musculoskeletal: Pain on   
palpation to the posterior and   
plantar aspects of the  
right heel, improved. Mild   
tenderness along the medial   
band of plantar fascia  
and medial calcaneal tubercle,   
resolved. Pain along the   
Achilles tendon  
calcaneal insertion, improved.   
Ankle joint dorsiflexion is   
reduced with the  
knee extended and full with   
knee flexion, improved. Ankle   
joint range of motion  
is intact. Muscle strength is   
5/5 to dorsiflexors, plantar   
flexors, inverters  
and everters. Compartments soft   
and compressible. No calf pain  
Diagnoses:  
1. Achilles tendinitis, right   
leg  
2. Equinus contracture of ankle  
3. Plantar fasciitis of right   
foot  
Imaging:  
Right foot 3 views   
weightbearing radiographs were   
ordered and interpreted as  
follows:  
No acute fractures or   
dislocations noted.  
No gross deformity noted.  
Very small posterior calcaneal   
enthesophyte noted.  
Os trigonum noted.  
Assessment/Plan:  
Patient was seen and evaluated.  
Discussed all clinical   
findings.  
I ordered, interpreted, and   
discussed right foot   
radiographic findings with  
patient as noted above.  
Patient is doing very well on   
her Achilles tendinitis and   
plantar fasciitis both  
improved since the last clinic   
visit.  
No x-rays taken today show no   
acute fractures or dislocations   
following recent  
injury.  
Discussed with patient the   
importance of continuing   
stretching, icing,  
anti-inflammatories and wearing   
good supportive shoes at all   
times.  
No need for formal physical   
therapy at this time.  
All questions were answered to   
patient satisfaction. Patient   
understands to  
call with any questions or   
concerns.  
Follow-up in 4 weeks for   
reevaluation.  
This note was partially created   
using voice recognition   
software and is  
inherently subject to errors   
including those of syntax and   
 sound-alike   
substitutions which may escape   
proofreading. In such   
instances, original  
meaning may be extrapolated by   
contextual derivation.  
Julieth Penn DPM, MS  
Podiatric Physician & Surgeon  
AUTHENTICATED BY JULIETH PENN, ON 2024 19:26:23          [facility]  
   
                                        2024 Note     NEW Patient Visit  
Julieth Penn DPM  
Patient Name: Cassi De La Torre.  
MRN: [id number].  
YOB: 1970, 54   
y.o..  
Gender: female.  
Subjective:  
Patient is a pleasant   
54-year-old female who presents   
to clinic complaining of  
pain to the right heel at the   
bottom and to the back of the   
heel for several  
months. States that the pain is   
on and off. Discussed the pain   
as a  
sharp/aching in nature. She   
denies any trauma or injury.   
Denies fevers,  
chills, nausea, vomiting, chest   
pain, shortness of breath, or   
any other  
constitutional symptoms.  
Past Medical History:  
Diagnosis Date  
Depression  
High cholesterol  
Past Surgical History:  
Procedure Laterality Date  
APPENDECTOMY  
Social History  
Socioeconomic History  
Marital status:   
Tobacco Use  
Smoking status: Never  
Smokeless tobacco: Never  
Vaping Use  
Vaping status: Never Used  
Substance and Sexual Activity  
Alcohol use: Yes  
Comment: rarely  
Drug use: Never  
Physical Examination:  
/79 (BP Location: Right   
arm, Patient Position: Sitting,   
BP Cuff Size:  
Adult)   Pulse 73   Temp 98.4   
degrees F (36.9 degrees C)   
(Infrared)   LMP 2024  
(Approximate)  
General Appearance: Alert,   
cooperative, no distress,   
appears stated age.  
Podiatric Exam  
Vascular: DP and PT pulses are   
palpable 2/4. Capillary refill   
time is less than  
3 secs to distal digits. Skin   
temperature is warm to warm   
from proximal tibial  
tuberosity to distal digit. No   
appreciable edema to bilateral   
foot or ankle.  
Neurological: Gross sensation   
is intact. Protective sensation   
is intact.  
Dermatologic: No open wounds or   
ulcerations. No palpable dell.   
Interdigital  
spaces are clean dry and   
intact.  
Musculoskeletal: Pain on   
palpation to the posterior and   
plantar aspects of the  
right heel. Mild tenderness   
along the medial band of   
plantar fascia and medial  
calcaneal tubercle. Pain along   
the Achilles tendon calcaneal   
insertion. Ankle  
joint dorsiflexion is reduced   
with the knee extended and full   
with knee flexion  
ankle joint range of motion is   
intact. Muscle strength is 5/5   
to dorsiflexors,  
plantar flexors, inverters and   
everters. Compartments soft and   
compressible.  
No calf pain  
Diagnoses:  
1. Achilles tendinitis, right   
leg  
2. Plantar fasciitis of right   
foot  
3. Equinus contracture of ankle  
Imaging:  
Right foot 3 views   
weightbearing radiographs were   
ordered and interpreted as  
follows:  
No acute fractures or   
dislocations noted.  
No gross deformity noted.  
Very small posterior calcaneal   
enthesophyte noted.  
Os trigonum noted.  
Assessment/Plan:  
Patient was seen and evaluated.  
Discussed all clinical   
findings.  
I ordered, interpreted, and   
discussed right foot   
radiographic findings with  
patient as noted above.  
Discussed conservative   
treatment options for Achilles   
tendinitis and plantar  
fasciitis including stretching,   
icing, anti-inflammatories, and   
good supportive  
shoes.  
I illustrated proper stretching   
techniques in the office.   
Advised on proper  
supporting shoes.  
I prescribed an oral   
anti-inflammatory, meloxicam,   
to help alleviate pain and  
inflammation with ambulation.   
This was sent to patient's   
pharmacy.  
All questions were answered to   
patient satisfaction. Patient   
understands to  
call with any questions or   
concerns.  
Follow-up in 2-3 weeks for   
reevaluation.  
This note was partially created   
using voice recognition   
software and is  
inherently subject to errors   
including those of syntax and   
 sound-alike   
substitutions which may escape   
proofreading. In such   
instances, original  
meaning may be extrapolated by   
contextual derivation.  
Julieth Penn DPM, MS  
Podiatric Physician & Surgeon  
AUTHENTICATED BY JULIETH PENN, ON 2024 22:52:05          [facility]  
   
                                                    2024 Evaluation + Plan  
   
note                                    Associated Problem(s): Migraine  
Formatting of this note might   
be different from the original.  
-Doing very well as her   
migraine headaches are very   
infrequent and we are providing   
a refill on her Imitrex  
Electronically signed by   
Janie Parish DO at   
2024 7:25 AM [facility]  
Work Phone:   
[phone]  
   
                                                    2024 Evaluation + Plan  
   
note                                    Associated Problem(s): Anxiety  
Formatting of this note might   
be different from the original.  
-Doing well at this time  
Electronically signed by   
Janie Parish DO at   
2024 7:25 AM EST  
                                        [facility]  
Work Phone:   
[phone]  
   
                                                    2024 Evaluation + Plan  
   
note                                    Associated Problem(s):   
Depression, major, single   
episode, mild (CMS/HCC)  
Formatting of this note might   
be different from the original.  
-Doing very well on the   
medication and we are providing   
refills today  
Electronically signed by   
Janie Parish DO at   
2024 7:25 AM EST  
                                        [facility]  
Work Phone:   
[phone]  
   
                                                    2024 Miscellaneous   
Notes                                   Associated Problem(s): Migraine  
Formatting of this note might   
be different from the original.  
-Doing very well as her   
migraine headaches are very   
infrequent and we are providing   
a refill on her Imitrex  
Electronically signed by   
Janie Parish DO at   
2024 7:25 AM EST  
Associated Problem(s): Anxiety  
Formatting of this note might   
be different from the original.  
-Doing well at this time  
Electronically signed by   
Janie Parish DO at   
2024 7:25 AM EST  
Associated Problem(s):   
Depression, major, single   
episode, mild (CMS/HCC)  
Formatting of this note might   
be different from the original.  
-Doing very well on the   
medication and we are providing   
refills today  
Electronically signed by   
Janie Parish DO at   
2024 7:25 AM EST  
Associated Problem(s):   
Irregular bleeding  
Formatting of this note might   
be different from the original.  
-We are referring her to   
gynecology so that she can   
continue an evaluation for her   
menstrual irregularity  
Electronically signed by   
Janie Parish DO at   
2024 7:24 AM EST  
Associated Problem(s): Mixed   
hyperlipidemia  
Formatting of this note might   
be different from the original.  
-She will continue with her   
wonderful exercise routine and   
watch her diet closely  
-We decided to check it again   
in 1 year  
Electronically signed by   
Janie Parish DO at   
2024 7:24 AM EST  
documented in this encounter            [facility]  
Work Phone:   
[phone]  
   
                                                    2024 Evaluation + Plan  
   
note                                    Associated Problem(s):   
Irregular bleeding  
Formatting of this note might   
be different from the original.  
-We are referring her to   
gynecology so that she can   
continue an evaluation for her   
menstrual irregularity  
Electronically signed by   
Janie Parish DO at   
2024 7:24 AM [facility]  
Work Phone:   
[phone]  
   
                                                    2024 Evaluation + Plan  
   
note                                    Associated Problem(s): Mixed   
hyperlipidemia  
Formatting of this note might   
be different from the original.  
-She will continue with her   
wonderful exercise routine and   
watch her diet closely  
-We decided to check it again   
in 1 year  
Electronically signed by   
Janie Parish DO at   
2024 7:24 AM [facility]  
Work Phone:   
[phone]  
   
                                                    2024 History of Presen  
t   
illness Narrative                       Formatting of this note is   
different from the original.  
Subjective  
Patient ID: Cassi De La Torre is a 53 y.o. female   
who presents for No chief   
complaint on file..  
HPI  
She is here today for her   
6-month routine checkup. She is   
looking well and also reports   
feeling well. We are reminded   
that we saw her several weeks   
ago because of the lingering   
respiratory infection.   
Thankfully her symptoms have   
resolved. She states she has   
been trying to walk   
approximately 40 minutes every   
day and overall reports feeling   
well. We did review her most   
recent laboratory test results   
and we discussed her slightly   
elevated cholesterol. We talked   
about possibly increasing the   
dose of the medication versus   
monitoring and working on   
lifestyle modifications. She   
would like to go with the   
latter. We also discussed her   
migraine headaches which are   
doing much better. She states   
in the last several months she   
has only had 1 episode. We are   
providing refills on   
medications today. We also   
discussed blood pressure and   
she will be checking it at home   
periodically. We also discussed   
her issues with her menstrual   
irregularity. Unfortunately the   
gynecologist she was seeing has   
left town so we will try to get   
her into somebody new for   
follow-up. I will summarize   
everything in a problem based   
format.  
Review of Systems  
Constitutional: Negative for   
fatigue.  
Respiratory: Negative for   
cough, shortness of breath and   
wheezing.  
Cardiovascular: Negative for   
chest pain, palpitations and   
leg swelling.  
Gastrointestinal: Negative for   
abdominal pain, blood in stool,   
diarrhea, nausea and vomiting.  
Musculoskeletal: Negative for   
arthralgias and back pain.  
  
Objective  
Physical Exam  
Vitals and nursing note   
reviewed.  
Constitutional:  
General: She is not in acute   
distress.  
Appearance: Normal appearance.  
HENT:  
Head: Normocephalic and   
atraumatic.  
Eyes:  
Conjunctiva/sclera:   
Conjunctivae normal.  
Cardiovascular:  
Rate and Rhythm: Normal rate   
and regular rhythm.  
Heart sounds: Normal heart   
sounds.  
Pulmonary:  
Effort: No respiratory   
distress.  
Breath sounds: No wheezing.  
Abdominal:  
Palpations: Abdomen is soft.  
Tenderness: There is no   
abdominal tenderness. There is   
no guarding.  
Musculoskeletal:  
General: No swelling. Normal   
range of motion.  
Skin:  
General: Skin is warm and dry.  
Neurological:  
General: No focal deficit   
present.  
Mental Status: She is alert and   
oriented to person, place, and   
time.  
Psychiatric:  
Behavior: Behavior normal.  
  
Recent Results (from the past   
672 hour(s))  
Basic Metabolic Panel  
Collection Time: 24 7:05   
AM  
Result Value Ref Range  
Glucose 91 74 - 99 mg/dL  
Sodium 139 136 - 145 mmol/L  
Potassium 4.3 3.5 - 5.3 mmol/L  
Chloride 105 98 - 107 mmol/L  
Bicarbonate 27 21 - 32 mmol/L  
Anion Gap 11 10 - 20 mmol/L  
Urea Nitrogen 19 6 - 23 mg/dL  
Creatinine 0.86 0.50 - 1.05   
mg/dL  
eGFR 80 >60 mL/min/1.73m*2  
Calcium 9.0 8.6 - 10.3 mg/dL  
Hemoglobin A1c  
Collection Time: 24 7:05   
AM  
Result Value Ref Range  
Hemoglobin A1C 5.5 see below %  
Estimated Average Glucose 111   
Not Established mg/dL  
Lipid panel  
Collection Time: 24 7:05   
AM  
Result Value Ref Range  
Cholesterol 212 (H) 0 - 199   
mg/dL  
HDL-Cholesterol 49.0 mg/dL  
Cholesterol/HDL Ratio 4.3  
LDL Calculated 133 (H) <=99   
mg/dL  
VLDL 30 0 - 40 mg/dL  
Triglycerides 150 (H) 0 - 149   
mg/dL  
Non HDL Cholesterol 163 (H) 0 -   
149 mg/dL  
  
Assessment/Plan  
Problem List Items Addressed   
This Visit  
  
ICD-10-CM  
Depression, major, single   
episode, mild (CMS/HCC) F32.0  
-Doing very well on the   
medication and we are providing   
refills today  
  
  
Relevant Medications  
ARIPiprazole (Abilify) 2 mg   
tablet  
sertraline (Zoloft) 100 mg   
tablet  
Irregular bleeding - Primary   
N92.6  
-We are referring her to   
gynecology so that she can   
continue an evaluation for her   
menstrual irregularity  
  
  
Relevant Orders  
Referral to Gynecology  
Migraine G43.909  
-Doing very well as her   
migraine headaches are very   
infrequent and we are providing   
a refill on her Imitrex  
  
  
Relevant Medications  
SUMAtriptan (Imitrex) 50 mg   
tablet  
Mixed hyperlipidemia E78.2  
-She will continue with her   
wonderful exercise routine and   
watch her diet closely  
-We decided to check it again   
in 1 year  
  
  
Relevant Medications  
atorvastatin (Lipitor) 20 mg   
tablet  
Taking medication for chronic   
disease R69  
Relevant Orders  
Basic Metabolic Panel  
Anxiety F41.9  
-Doing well at this time  
  
  
Relevant Medications  
sertraline (Zoloft) 100 mg   
tablet  
  
  
  
Janie Parish DO  
Electronically signed by   
Janie Parish DO at   
2024 7:26 AM EST  
documented in this encounter            [facility]  
Work Phone:   
[phone]  
   
                                        2024 Instructions   
  
  
Janie Parish DO -   
2024 7:00 AM   
ESTFormatting of this note   
might be different from the   
original.  
Please keep up the great work   
with your exercise routine  
Please check your blood   
pressure periodically and if   
you discover that your numbers   
are running high please let us   
know  
The staff will be helping you   
to get an appointment with a   
female gynecologist so that we   
can make sure everything is   
okay with your irregular   
bleeding  
I sent refills on all your   
medications and please let me   
know if there are any issues  
We will see you back in   
approximately 6 months and I   
did order a fasting BMP so   
please try to remember to go   
just prior to that visit  
Electronically signed by   
Janie Parish DO at   
2024 7:26 AM EST  
  
documented in this encounter            [facility]  
Work Phone:   
[phone]  
   
                                                    2024 Evaluation + Plan  
   
note                                    Associated Problem(s): Acute   
bronchitis  
Formatting of this note might   
be different from the original.  
-Symptoms began approximately a   
week ago Wednesday  
-The initial event was felt to   
be secondary to allergy due to   
a cat allergy  
-She now has developed some   
symptoms consistent with an   
infection including the   
production of discolored mucus  
-I am giving her a Z-Florentin  
-I will have her go for a chest   
x-ray and have agreed to   
contact her with results  
-We will repeat her Medrol   
Dosepak and I am also giving   
her a refill on her cough   
medicine and inhaler. She will   
let me know how she is doing  
Electronically signed by   
Janie Parish DO at   
2024 7:38 AM EST  
                                        [facility]  
Work Phone:   
[phone]  
   
                                                    2024 Miscellaneous   
Notes                                   Associated Problem(s): Acute   
bronchitis  
Formatting of this note might   
be different from the original.  
-Symptoms began approximately a   
week ago Wednesday  
-The initial event was felt to   
be secondary to allergy due to   
a cat allergy  
-She now has developed some   
symptoms consistent with an   
infection including the   
production of discolored mucus  
-I am giving her a Z-Florentin  
-I will have her go for a chest   
x-ray and have agreed to   
contact her with results  
-We will repeat her Medrol   
Dosepak and I am also giving   
her a refill on her cough   
medicine and inhaler. She will   
let me know how she is doing  
Electronically signed by   
Janie Parish DO at   
2024 7:38 AM EST  
documented in this encounter            [facility]  
Work Phone:   
[phone]  
   
                                                    2024 History of Presen  
t   
illness Narrative                       Formatting of this note is   
different from the original.  
Subjective  
Patient ID: Cassi De La Torre is a 53 y.o. female   
who presents for No chief   
complaint on file..  
HPI  
She is here today with a 1 week   
history of respiratory   
symptoms. She explains that she   
was away from home visiting and   
unfortunately got exposed to a   
cat. She is very allergic. She   
started developing respiratory   
symptoms and ended up going to   
an urgent care in [place].   
She was seen on .   
She was prescribed a Medrol   
Dosepak as well as an inhaler   
and cough medicine. She states   
unfortunately her symptoms have   
not improved and now she is   
developing some discolored   
mucus. We did conduct a review   
of systems and based on today's   
presentation I have decided to   
order a chest x-ray. She will   
go at her earliest convenience   
and I will contact her with the   
results. I am also going to   
prescribe an antibiotic this   
time I decided to repeat her   
Medrol Dosepak. We will also be   
given a refill on her   
albuterol. She will keep me   
apprised of how she is doing  
Review of Systems  
Constitutional: Positive for   
fatigue. Negative for fever.  
HENT: Positive for congestion,   
postnasal drip and sinus   
pressure. Negative for ear pain   
and sore throat.  
Respiratory: Positive for   
cough, chest tightness,   
shortness of breath and   
wheezing.  
Cardiovascular: Negative for   
palpitations and leg swelling.  
Gastrointestinal: Negative for   
diarrhea, nausea and vomiting.  
Musculoskeletal: Negative for   
arthralgias.  
  
Objective  
Physical Exam  
Vitals and nursing note   
reviewed.  
Constitutional:  
General: She is not in acute   
distress.  
Appearance: Normal appearance.  
HENT:  
Head: Normocephalic and   
atraumatic.  
Eyes:  
Conjunctiva/sclera:   
Conjunctivae normal.  
Cardiovascular:  
Rate and Rhythm: Normal rate   
and regular rhythm.  
Heart sounds: Normal heart   
sounds.  
Pulmonary:  
Effort: No respiratory   
distress.  
Breath sounds: No wheezing.  
Abdominal:  
Palpations: Abdomen is soft.  
Tenderness: There is no   
abdominal tenderness. There is   
no guarding.  
Musculoskeletal:  
General: No swelling. Normal   
range of motion.  
Skin:  
General: Skin is warm and dry.  
Neurological:  
General: No focal deficit   
present.  
Mental Status: She is alert and   
oriented to person, place, and   
time.  
Psychiatric:  
Behavior: Behavior normal.  
  
Assessment/Plan  
Problem List Items Addressed   
This Visit  
  
ICD-10-CM  
Acute bronchitis - Primary   
J20.9  
-Symptoms began approximately a   
week ago Wednesday  
-The initial event was felt to   
be secondary to allergy due to   
a cat allergy  
-She now has developed some   
symptoms consistent with an   
infection including the   
production of discolored mucus  
-I am giving her a Z-Florentin  
-I will have her go for a chest   
x-ray and have agreed to   
contact her with results  
-We will repeat her Medrol   
Dosepak and I am also giving   
her a refill on her cough   
medicine and inhaler. She will   
let me know how she is doing  
  
  
Relevant Medications  
azithromycin (Zithromax) 250 mg   
tablet  
albuterol 90 mcg/actuation   
inhaler  
benzonatate (Tessalon) 200 mg   
capsule  
methylPREDNISolone (Medrol   
Dospak) 4 mg tablets  
Other Relevant Orders  
XR chest 2 views  
  
  
  
Janie Parish DO  
Electronically signed by   
Janie Parish DO at   
2024 7:40 AM EST  
documented in this encounter            [facility]  
Work Phone:   
[phone]  
   
                                        2024 Instructions   
  
  
Janie Parish DO -   
2024 7:20 AM   
ESTFormatting of this note   
might be different from the   
original.  
As we discussed I have ordered   
a chest x-ray and please try to   
go at your earliest   
convenience. Once the results   
are known I will contact you  
I am also giving a prescription   
for a Z-Florentin which is an   
antibiotic. Although you take   
the Z-Florentin for 5 days please   
remember that it persist in   
your system for 10  
We also recommend using your   
albuterol and I also gave you a   
Medrol Dosepak to take. Please   
be sure to take the Medrol with   
food because sometimes it can   
irritate the stomach  
I am also recommending you take   
over-the-counter Mucinex twice   
a day because this helps break   
up the mucus and mobilize it   
out of your nasal passages and   
lungs  
I am also recommending rest   
with fluids and Tylenol for   
symptomatic relief. Please   
contact me if you feel like   
your condition is worsening as   
opposed to getting better and   
please also let me know how you   
are doing in the days to come  
Electronically signed by   
Janie Parish DO at   
2024 7:39 AM EST  
  
documented in this encounter            [facility]  
Work Phone:   
[phone]  
   
                                                    2023 Evaluation + Plan  
   
note                                    Associated Problem(s):   
Postmenopausal bleeding  
Formatting of this note might   
be different from the original.  
-We will order a transvaginal   
ultrasound and we will contact   
her with the results  
-She has agreed to make an   
appoint with Dr. Baker for   
follow-up  
Electronically signed by   
Janie Parish DO at   
2023 9:00 AM EDT  
                                        [facility]  
Work Phone:   
[phone]  
   
                                                    2023 Evaluation + Plan  
   
note                                    Associated Problem(s):   
Depression, major, single   
episode, mild (CMS/HCC)  
Formatting of this note might   
be different from the original.  
-Doing much better  
-We will continue with   
counseling  
-We will continue with   
sertraline 100 mg 2 tablets   
daily  
-We will continue with Abilify   
2 mg daily  
Electronically signed by   
Janie Parish DO at   
2023 9:00 AM EDT  
                                        [facility]  
Work Phone:   
[phone]  
   
                                                    2023 Miscellaneous   
Notes                                   Associated Problem(s):   
Postmenopausal bleeding  
Formatting of this note might   
be different from the original.  
-We will order a transvaginal   
ultrasound and we will contact   
her with the results  
-She has agreed to make an   
appoint with Dr. Baker for   
follow-up  
Electronically signed by   
Janie Parish DO at   
2023 9:00 AM EDT  
Associated Problem(s):   
Depression, major, single   
episode, mild (CMS/HCC)  
Formatting of this note might   
be different from the original.  
-Doing much better  
-We will continue with   
counseling  
-We will continue with   
sertraline 100 mg 2 tablets   
daily  
-We will continue with Abilify   
2 mg daily  
Electronically signed by   
Janie Parish DO at   
2023 9:00 AM EDT  
documented in this encounter            [facility]  
Work Phone:   
[phone]  
   
                                                    2023 History of Presen  
t   
illness Narrative                       Formatting of this note is   
different from the original.  
Subjective  
Patient ID: Cassi De La Torre is a 53 y.o. female   
who presents for Follow-up (Med   
refill).  
HPI  
She is here today for   
follow-up. We last saw her on   
 and she was having a lot   
of stress at that time. We   
added Abilify to her daily   
regimen and she is back today   
for follow-up. She states life   
got pretty chaotic because her   
father was very ill and was out   
of state. She states she is   
doing better and she has signs   
a 60% improvement from when we   
saw her last time. She is also   
going to counseling and will be   
going to her second session. We   
will continue with her current   
medical regimen and we are   
providing refills today. We   
also briefly discussed her   
thyroid blood test which came   
back entirely normal. She also   
explains that she has been in   
menopause for a little over a   
year and has oddly experienced   
2 menstrual cycles. I told her   
that sometimes this can happen   
however we need to investigate   
and make sure it is not   
something more serious. I am   
going to order a transvaginal   
ultrasound and explained this   
to her. She will call make an   
appoint with Dr. Baker for a   
follow-up. If everything goes   
according to plan we will see   
her back in approximately 6   
months and she knows to get   
fasting lab work done prior to   
that visit  
Review of Systems  
Constitutional: Negative for   
fatigue.  
Respiratory: Negative for   
cough, shortness of breath and   
wheezing.  
Cardiovascular: Negative for   
chest pain, palpitations and   
leg swelling.  
Gastrointestinal: Negative for   
abdominal pain, blood in stool,   
diarrhea, nausea and vomiting.  
Musculoskeletal: Negative for   
arthralgias and back pain.  
  
Objective  
Physical Exam  
Vitals and nursing note   
reviewed.  
Constitutional:  
General: She is not in acute   
distress.  
Appearance: Normal appearance.  
HENT:  
Head: Normocephalic and   
atraumatic.  
Eyes:  
Conjunctiva/sclera:   
Conjunctivae normal.  
Cardiovascular:  
Rate and Rhythm: Normal rate   
and regular rhythm.  
Heart sounds: Normal heart   
sounds.  
Pulmonary:  
Effort: No respiratory   
distress.  
Breath sounds: No wheezing.  
Abdominal:  
Palpations: Abdomen is soft.  
Tenderness: There is no   
abdominal tenderness. There is   
no guarding.  
Musculoskeletal:  
General: No swelling. Normal   
range of motion.  
Skin:  
General: Skin is warm and dry.  
Neurological:  
General: No focal deficit   
present.  
Mental Status: She is alert and   
oriented to person, place, and   
time.  
Psychiatric:  
Behavior: Behavior normal.  
  
Assessment/Plan  
Problem List Items Addressed   
This Visit  
  
Depression, major, single   
episode, mild (CMS/HCC)  
-Doing much better  
-We will continue with   
counseling  
-We will continue with   
sertraline 100 mg 2 tablets   
daily  
-We will continue with Abilify   
2 mg daily  
  
  
Relevant Medications  
ARIPiprazole (Abilify) 2 mg   
tablet  
Hyperglycemia - Primary  
Relevant Orders  
Basic Metabolic Panel  
Hemoglobin A1C  
Mixed hyperlipidemia  
Relevant Medications  
atorvastatin (Lipitor) 20 mg   
tablet  
Other Relevant Orders  
Lipid panel  
Postmenopausal bleeding  
-We will order a transvaginal   
ultrasound and we will contact   
her with the results  
-She has agreed to make an   
appoint with Dr. Baker for   
follow-up  
  
  
Relevant Orders  
US pelvis transvaginal  
  
  
  
Janie Parish DO  
Electronically signed by   
Janie Parish DO at   
2023 9:02 AM EDT  
documented in this encounter            [facility]  
Work Phone:   
[phone]  
   
                                        2023 Instructions   
  
  
Janie Parish DO -   
2023 8:40 AM   
EDTFormatting of this note   
might be different from the   
original.  
As we discussed the staff will   
be helping you to schedule a   
transvaginal ultrasound. This   
will allow us to see your   
uterus and make sure that it is   
looking normal. Even if this   
test comes back normal you   
still need to see Dr. Baker   
because sometimes ultrasound   
can miss some minor issues  
We have provided refills on   
medications and if everything   
goes according to plan we would   
like to see you back in   
approximately 6 months. Just   
prior to that visit we will be   
checking your blood sugar and   
your cholesterol so please   
remember to go fasting for that   
lab work prior to your visit  
Please also remember to get the   
season's flu vaccine in the   
fall  
Electronically signed by   
Janie Parish DO at   
2023 9:01 AM EDT  
  
documented in this encounter            [facility]  
Work Phone:   
[phone]  
   
                                                    2023 Evaluation + Plan  
   
note                                    Associated Problem(s):   
Depression, major, single   
episode, mild (CMS/HCC)  
Formatting of this note might   
be different from the original.  
-She will continue with   
sertraline 200 mg daily (she   
has been on this dose for the   
past 15 years)  
-I am adding Abilify 2 mg 1   
tablet daily  
-We will check a TSH and I will   
contact her with results when   
it comes back  
-I would like to see her back   
in 3 to 4 weeks for follow-up  
Electronically signed by   
Janie Parish DO at   
2023 9:42 AM EDT  
                                        [facility]  
Work Phone:   
[phone]  
   
                                                    2023 Evaluation + Plan  
   
note                                    Associated Problem(s): Anxiety  
Formatting of this note might   
be different from the original.  
-She understands that often   
times depression and anxiety go   
hand-in-hand so hopefully by   
improving the depression and   
anxiety will improve  
-She will try to get into a   
counselor as quickly as   
possible  
-I am giving her hydroxyzine to   
use as needed for anxiety  
Electronically signed by   
Janie Parish DO at   
2023 9:42 AM EDT  
                                        [facility]  
Work Phone:   
[phone]  
   
                                                    2023 Miscellaneous   
Notes                                   Associated Problem(s):   
Depression, major, single   
episode, mild (CMS/HCC)  
Formatting of this note might   
be different from the original.  
-She will continue with   
sertraline 200 mg daily (she   
has been on this dose for the   
past 15 years)  
-I am adding Abilify 2 mg 1   
tablet daily  
-We will check a TSH and I will   
contact her with results when   
it comes back  
-I would like to see her back   
in 3 to 4 weeks for follow-up  
Electronically signed by   
Janie Parish DO at   
2023 9:42 AM EDT  
Associated Problem(s): Anxiety  
Formatting of this note might   
be different from the original.  
-She understands that often   
times depression and anxiety go   
hand-in-hand so hopefully by   
improving the depression and   
anxiety will improve  
-She will try to get into a   
counselor as quickly as   
possible  
-I am giving her hydroxyzine to   
use as needed for anxiety  
Electronically signed by   
Janie Parish DO at   
2023 9:42 AM EDT  
documented in this encounter            [facility]  
Work Phone:   
[phone]  
   
                                                    2023 History of Presen  
t   
illness Narrative                       Formatting of this note might   
be different from the original.  
Pt is here today discuss   
anxiety and depression.  
Electronically signed by Tony Peguero MA at 2023   
9:44 AM EDT  
Formatting of this note is   
different from the original.  
Subjective  
Patient ID: Cassi De La Torre is a 53 y.o. female   
who presents for No chief   
complaint on file..  
HPI  
She is here today to discuss   
some increased stress in her   
life as of recent. Patient   
states she is having a lot of   
stress at work and even some   
stress at home. She has been   
feeling more depressed and also   
experiencing some anxiety. She   
states she has not had a   
full-blown panic attack but   
sometimes she is feeling   
anxious and experiences   
palpitations. She states she is   
working on some conflict   
resolution at work and she   
feels optimistic that things   
will change but again she is   
just not feeling well. She   
states she has an appointment   
to get into a counselor that is   
covered through her insurance   
plan but it is going to take a   
long time to get in because of   
their heavy schedule. I did   
suggest that she possibly talk   
to her insurance company about   
this issue and they might allow   
her to see somebody different.   
In the meantime we discussed   
her Zoloft which she has been   
taking for the past 15 years.   
She has been on 200 mg for a   
very long time. We discussed   
adding a medication called   
Abilify as often times this   
helps with the efficacy of her   
current antidepressant and may   
allow us not to have to switch   
medicines. We also talked about   
the anxiety and I will be   
giving her hydroxyzine to take.   
We talked about side effects   
and she will try a dose when   
she is at home away from work   
to see how she responds to it.   
We also talked about checking a   
thyroid blood test just to make   
sure there is no abnormalities.   
We will plan on seeing her back   
in 3 to 4 weeks for follow-up   
and of course she will call   
sooner if things or not going   
well.  
Review of Systems  
Constitutional: Negative for   
fatigue.  
Respiratory: Negative for   
cough, chest tightness,   
shortness of breath and   
wheezing.  
Cardiovascular: Positive for   
palpitations. Negative for   
chest pain and leg swelling.  
Gastrointestinal: Negative for   
abdominal pain, blood in stool,   
diarrhea, nausea and vomiting.  
  
Objective  
Physical Exam  
Vitals and nursing note   
reviewed.  
Constitutional:  
General: She is not in acute   
distress.  
Appearance: Normal appearance.  
HENT:  
Head: Normocephalic and   
atraumatic.  
Eyes:  
Conjunctiva/sclera:   
Conjunctivae normal.  
Cardiovascular:  
Rate and Rhythm: Normal rate   
and regular rhythm.  
Heart sounds: Normal heart   
sounds.  
Pulmonary:  
Effort: No respiratory   
distress.  
Breath sounds: No wheezing.  
Abdominal:  
Palpations: Abdomen is soft.  
Tenderness: There is no   
abdominal tenderness. There is   
no guarding.  
Musculoskeletal:  
General: No swelling. Normal   
range of motion.  
Skin:  
General: Skin is warm and dry.  
Neurological:  
General: No focal deficit   
present.  
Mental Status: She is alert and   
oriented to person, place, and   
time.  
Psychiatric:  
Behavior: Behavior normal.  
  
Assessment/Plan  
Problem List Items Addressed   
This Visit  
  
  
Other  
Depression, major, single   
episode, mild (CMS/HCC) -   
Primary  
-She will continue with   
sertraline 200 mg daily (she   
has been on this dose for the   
past 15 years)  
-I am adding Abilify 2 mg 1   
tablet daily  
-We will check a TSH and I will   
contact her with results when   
it comes back  
-I would like to see her back   
in 3 to 4 weeks for follow-up  
  
  
Relevant Medications  
ARIPiprazole (Abilify) 2 mg   
tablet  
Anxiety  
-She understands that often   
times depression and anxiety go   
hand-in-hand so hopefully by   
improving the depression and   
anxiety will improve  
-She will try to get into a   
counselor as quickly as   
possible  
-I am giving her hydroxyzine to   
use as needed for anxiety  
  
  
Relevant Medications  
hydrOXYzine HCL (Atarax) 25 mg   
tablet  
Other Relevant Orders  
TSH  
  
  
  
Janie Parish DO  
  
Electronically signed by   
Janie Parish DO at   
2023 9:44 AM EDT  
documented in this encounter            [facility]  
Work Phone:   
[phone]  
   
                                        2023 Instructions   
  
  
Janie Parish DO -   
2023 9:20 AM   
EDTFormatting of this note   
might be different from the   
original.  
As we discussed you will get   
your thyroid blood test drawn   
today before leaving and we   
will contact you with results  
I sent 2 prescriptions to your   
pharmacy. 1 is called Abilify   
to be taken once a day with   
your Zoloft. This medication   
has been shown to help with   
depression and can often times   
provide significant relief  
The hydroxyzine is for anxiety   
and will be taken only as   
needed. I suggest you try a   
dose while at home to see how   
you respond as far as side   
effects are concerned  
I am very pleased that you are   
trying to get into the   
counselor and you might want to   
call insurance about the issue   
with getting an appointment  
Electronically signed by   
Janie Parish DO at   
2023 9:44 AM EDT  
  
documented in this encounter            [facility]  
Work Phone:   
[phone]  
   
                                                    2023 History of Presen  
t   
illness Narrative                       Evelyn is a 53-year-old woman   
who comes in for routine GYN   
exam. Patient is due for   
mammogram. Patient is not due   
for a Pap smear. Patient   
experiencing significant   
anxiety and is very tearful.   
She reports she stopped taking   
her birth control pills   
approximately 3 weeks ago.   
Patient also reports vulvar   
itching but nothing in the   
vagina.                                 [address]  
Work Phone:   
[phone]  
   
                                                    2023 Evaluation + Plan  
   
note                                    Associated Problem(s): Mixed   
hyperlipidemia  
Formatting of this note might   
be different from the original.  
-She will give us a copy of her   
most recent comprehensive lab   
panel through the workplace   
which includes cholesterol  
-She will continue with   
atorvastatin 20 mg daily  
Electronically signed by   
Janie Parish DO at   
2023 7:38 AM EDT  
                                        [facility]  
Work Phone:   
[phone]  
   
                                                    2023 Evaluation + Plan  
   
note                                    Associated Problem(s):   
Hyperglycemia  
Formatting of this note might   
be different from the original.  
-Her most recent blood sugar is   
normal with a hemoglobin A1c of   
5.5. We will continue to check   
periodically  
Electronically signed by   
Janie Parish DO at   
2023 7:38 AM EDT  
                                        [facility]  
Work Phone:   
[phone]  
   
                                                    2023 Evaluation + Plan  
   
note                                    Associated Problem(s):   
Depression, major, single   
episode, mild (CMS/HCC)  
Formatting of this note might   
be different from the original.  
-Doing well on her sertraline   
100 mg daily  
Electronically signed by   
Janie Parish DO at   
2023 7:38 AM EDT  
                                        [facility]  
Work Phone:   
[phone]  
   
                                                    2023 Miscellaneous   
Notes                                   Associated Problem(s): Mixed   
hyperlipidemia  
Formatting of this note might   
be different from the original.  
-She will give us a copy of her   
most recent comprehensive lab   
panel through the workplace   
which includes cholesterol  
-She will continue with   
atorvastatin 20 mg daily  
Electronically signed by   
Janie Parish DO at   
2023 7:38 AM EDT  
Associated Problem(s):   
Hyperglycemia  
Formatting of this note might   
be different from the original.  
-Her most recent blood sugar is   
normal with a hemoglobin A1c of   
5.5. We will continue to check   
periodically  
Electronically signed by   
Janie Parish DO at   
2023 7:38 AM EDT  
Associated Problem(s):   
Depression, major, single   
episode, mild (CMS/HCC)  
Formatting of this note might   
be different from the original.  
-Doing well on her sertraline   
100 mg daily  
Electronically signed by   
Janie Parish DO at   
2023 7:38 AM EDT  
Associated Problem(s): Eczema  
Formatting of this note might   
be different from the original.  
-I have prescribed a topical   
cream called Elidel. She will   
let me know if its not   
affordable and also let us know   
if its effective.  
-We did discuss a   
hypoallergenic regimen which   
includes avoiding fabric   
softeners and dryer sheets.   
Using hypoallergenic detergent   
such as Dreft and doing the   
double rinse cycle. Also   
avoiding lotions or perfumes.   
Also using mild soap such as   
Dove sensitive soap  
Electronically signed by   
Janie Parish DO at   
2023 7:37 AM EDT  
Associated Problem(s): Class 3   
severe obesity due to excess   
calories without serious   
comorbidity with body mass   
index (BMI) of 40.0 to 44.9 in   
adult (CMS/HCC)  
Formatting of this note might   
be different from the original.  
-She will work onhealthy diet   
with weight loss and I have   
specifically recommended weight   
watchers.  
Electronically signed by   
Janie Parish DO at   
2023 7:36 AM EDT  
Associated Problem(s): Personal   
history of colonic polyps  
Formatting of this note might   
be different from the original.  
-Her last colonoscopy was   
performed on 2019 and she   
did have a couple of polyps.   
She will definitely need to   
have a follow-up colonoscopy   
next year  
Electronically signed by   
Janie Parish DO at   
2023 7:36 AM EDT  
documented in this encounter            [facility]  
Work Phone:   
[phone]  
   
                                                    2023 Evaluation + Plan  
   
note                                    Associated Problem(s): Eczema  
Formatting of this note might   
be different from the original.  
-I have prescribed a topical   
cream called Elidel. She will   
let me know if its not   
affordable and also let us know   
if its effective.  
-We did discuss a   
hypoallergenic regimen which   
includes avoiding fabric   
softeners and dryer sheets.   
Using hypoallergenic detergent   
such as Dreft and doing the   
double rinse cycle. Also   
avoiding lotions or perfumes.   
Also using mild soap such as   
Dove sensitive soap  
Electronically signed by   
Janie Parish DO at   
2023 7:37 AM EDT  
                                        [facility]  
Work Phone:   
[phone]  
   
                                                    2023 Evaluation + Plan  
   
note                                    Associated Problem(s): Class 3   
severe obesity due to excess   
calories without serious   
comorbidity with body mass   
index (BMI) of 40.0 to 44.9 in   
adult (CMS/[facility])  
Formatting of this note might   
be different from the original.  
-She will work onhealthy diet   
with weight loss and I have   
specifically recommended weight   
watchers.  
Electronically signed by   
Janie Parish DO at   
2023 7:36 AM EDT  
                                        [facility]  
Work Phone:   
[phone]  
   
                                                    2023 Evaluation + Plan  
   
note                                    Associated Problem(s): Personal   
history of colonic polyps  
Formatting of this note might   
be different from the original.  
-Her last colonoscopy was   
performed on 2019 and she   
did have a couple of polyps.   
She will definitely need to   
have a follow-up colonoscopy   
next year  
Electronically signed by   
Janie Parish DO at   
2023 7:36 AM EDT  
                                        [facility]  
Work Phone:   
[phone]  
   
                                                    2023 History of Presen  
t   
illness Narrative                       Formatting of this note is   
different from the original.  
Subjective  
Patient ID: Cassi De La Torre is a 52 y.o. female   
who presents for routine 6 mo   
check up; lab and med review;   
c/o eczema and OTC creams are   
not working.  
  
HPI  
  
Review of Systems  
  
Objective  
Vitals:  
23 0707  
BP: 142/86  
BP Location: Right arm  
Pulse: 84  
SpO2: 97%  
Weight: 105 kg (231 lb)  
Height: 1.6 m (5' 3 )  
  
Physical Exam  
  
Assessment/Plan  
There are no diagnoses linked   
to this encounter.  
  
  
Electronically signed by Antonina Gardner CMA at 2023 7:40   
AM EDT  
Formatting of this note is   
different from the original.  
Subjective  
Patient ID: Cassi De La Torre is a 52 y.o. female   
who presents for No chief   
complaint on file..  
HPI  
She is here today for her   
6-month checkup. She is looking   
well and reports feeling well.   
When she arrived her blood   
pressure was a bit generous but   
after sitting for a while it   
did come down significantly. We   
talked about doing some   
ambulatory monitoring to make   
sure that her blood pressure is   
under relatively good control.   
She also suffers with eczema   
and has had it for many years.   
She has tried various over the   
counter topical agents such as   
hydrocortisone but it does not   
seem to help. Her condition   
comes and goes. We talked about   
conducting a hypoallergenic   
regimen and I will give her a   
prescription cream today. She   
will let us know if is not   
affordable or if its not   
effective. She also states she   
is doing relatively well with   
her sertraline although she is   
having a lot of stress recently   
because of a new job position.   
We also went over the results   
of recent lab work and overall   
I am very pleased with her   
numbers. She states she had a   
comprehensive lab panel from   
her workplace recently and her   
cholesterol was very good at   
that time. She has agreed to   
give us a copy and again we   
will check her cholesterol   
periodically. We also discussed   
her history of colon polyps.   
Her last colonoscopy was in   
2019 and she will definitely   
need to have a colonoscopy next   
year as part of her   
surveillance. She is also   
agreeable to getting her   
mammogram scheduled. Overall   
she appears to be doing well   
and she will be working on   
healthy diet with weight loss.  
Review of Systems  
Constitutional: Negative for   
fatigue.  
Respiratory: Negative for   
cough, chest tightness,   
shortness of breath and   
wheezing.  
Cardiovascular: Negative for   
chest pain, palpitations and   
leg swelling.  
Gastrointestinal: Negative for   
abdominal pain, blood in stool,   
diarrhea, nausea and vomiting.  
Musculoskeletal: Negative for   
arthralgias and back pain.  
Objective  
Physical Exam  
Vitals and nursing note   
reviewed.  
Constitutional:  
General: She is not in acute   
distress.  
Appearance: Normal appearance.  
HENT:  
Head: Normocephalic and   
atraumatic.  
Eyes:  
Conjunctiva/sclera:   
Conjunctivae normal.  
Cardiovascular:  
Rate and Rhythm: Normal rate   
and regular rhythm.  
Heart sounds: Normal heart   
sounds.  
Pulmonary:  
Effort: No respiratory   
distress.  
Breath sounds: No wheezing.  
Abdominal:  
Palpations: Abdomen is soft.  
Tenderness: There is no   
abdominal tenderness. There is   
no guarding.  
Musculoskeletal:  
General: No swelling. Normal   
range of motion.  
Skin:  
General: Skin is warm and dry.  
Neurological:  
General: No focal deficit   
present.  
Mental Status: She is alert and   
oriented to person, place, and   
time.  
Psychiatric:  
Behavior: Behavior normal.  
  
Recent Results (from the past   
672 hour(s))  
Hemoglobin A1C  
Collection Time: 23 7:05   
AM  
Result Value Ref Range  
Hemoglobin A1C 5.5 %  
Estimated Average Glucose 111   
MG/DL  
Basic Metabolic Panel  
Collection Time: 23 7:05   
AM  
Result Value Ref Range  
Glucose 84 74 - 99 mg/dL  
Sodium 137 136 - 145 mmol/L  
Potassium 4.2 3.5 - 5.3 mmol/L  
Chloride 104 98 - 107 mmol/L  
Bicarbonate 27 21 - 32 mmol/L  
Anion Gap 10 10 - 20 mmol/L  
Urea Nitrogen 21 6 - 23 mg/dL  
Creatinine 0.87 0.50 - 1.05   
mg/dL  
GFR Female 79 >90 mL/min/1.73m2  
Calcium 9.2 8.6 - 10.3 mg/dL  
  
Assessment/Plan  
Problem List Items Addressed   
This Visit  
  
  
Endocrine/Metabolic  
Class 3 severe obesity due to   
excess calories without serious   
comorbidity with body mass   
index (BMI) of 40.0 to 44.9 in   
adult (CMS/[facility])  
-She will work onhealthy diet   
with weight loss and I have   
specifically recommended weight   
watchers.  
  
  
Relevant Orders  
Follow Up In Primary Care  
  
Infectious/Inflammatory  
Eczema  
-I have prescribed a topical   
cream called Elidel. She will   
let me know if its not   
affordable and also let us know   
if its effective.  
-We did discuss a   
hypoallergenic regimen which   
includes avoiding fabric   
softeners and dryer sheets.   
Using hypoallergenic detergent   
such as Dreft and doing the   
double rinse cycle. Also   
avoiding lotions or perfumes.   
Also using mild soap such as   
Dove sensitive soap  
  
  
Relevant Medications  
pimecrolimus (Elidel) 1 % cream  
Other Relevant Orders  
Follow Up In Primary Care  
  
Other  
Depression, major, single   
episode, mild (CMS/HCC)  
-Doing well on her sertraline   
100 mg daily  
  
  
Relevant Orders  
Follow Up In Primary Care  
Hyperglycemia  
-Her most recent blood sugar is   
normal with a hemoglobin A1c of   
5.5. We will continue to check   
periodically  
  
  
Relevant Orders  
Follow Up In Primary Care  
Basic Metabolic Panel  
Hemoglobin A1c  
  
Other Visit Diagnoses  
  
Encounter for screening   
mammogram for malignant   
neoplasm of breast - Primary  
Relevant Orders  
BI mammo bilateral screening   
tomosynthesis  
  
  
  
  
  
Janie Parish DO  
  
Electronically signed by   
Janie Parish DO at   
2023 7:40 AM EDT  
documented in this encounter            [facility]  
Work Phone:   
[phone]  
   
                                        2023 Instructions   
  
  
Janie Parish DO -   
2023 7:00 AM   
EDTFormatting of this note   
might be different from the   
original.  
-Overall I am very pleased with   
your laboratory test results   
today  
-Please remember to send us a   
copy of your recent   
comprehensive lab profile and   
we will put it in your file  
-We are going to help you get   
your screening mammogram   
scheduled  
-Please call me if the cream I   
sent in today is too expensive   
or if it is ineffective  
-Please try to check your blood   
pressure at home when you have   
the opportunity to sit and   
relax and give us an update   
after several weeks. We   
generally recommend the blood   
pressure monitor called [employer]   
and we like the arm cuffs as   
opposed to the wrist cuffs  
-If everything goes according   
to plan I will see you back in   
6 months for reevaluation and   
please remember to get your lab   
work done just prior to that   
visit. I will not be ordering a   
cholesterol profile since you   
had it recently  
Electronically signed by   
Janie Parish DO at   
2023 7:40 AM EDT  
  
documented in this encounter            [facility]  
Work Phone:   
[phone]  
   
                                                    2021 History of Presen  
t   
illness Serenity                       Cassi presents to the office   
for a one month follow up   
regarding initiation of   
propranolol, as well as   
complaints of nightmares,   
sleepiness.she states she has   
noticed that propranolol is   
causing her nightmares,   
insomnia-- as well as tiredness   
during the day. she has noticed   
these SE since she began the   
medication approximately four   
weeks ago. however, she does   
state that she has not had a   
migraine in the last month. she   
reports the side effects of the   
propranolol are just too great,   
and she doesn't wish to   
continue the medication.PHQ-9:   
7GAD-7: 4                               -[facility]  
Work Phone:   
[phone]  
  
  
  
                                                    Evaluation note   
  
  
  
                                                    Diagnosis  
   
                                                      
  
  
Encounter for screening mammogram for malignant neoplasm of breast- Primary  
   
                                                      
  
  
Eczema, unspecified type  
   
                                                      
  
  
Class 3 severe obesity due to excess calories without serious comorbidity with 
body   
mass index (BMI) of 40.0 to 44.9 in adult (CMS/HCC)  
   
                                                      
  
  
Depression, major, single episode, mild (CMS/HCC)  
   
                                                      
  
  
Hyperglycemia  
  
  
Other abnormal glucose  
  
documented in this encounter  
[facility]  
Work Phone: [phone] note*   
  
                                                    Diagnosis  
   
                                                      
  
  
Depression, major, single episode, mild (CMS/HCC)- Primary  
   
                                                      
  
  
Anxiety  
  
  
Anxiety state, unspecified  
  
documented in this encounter  
[facility]  
Work Phone: [phone] note*   
  
                                                    Diagnosis  
   
                                                      
  
  
Hyperglycemia- Primary  
  
  
Other abnormal glucose  
   
                                                      
  
  
Depression, major, single episode, mild (CMS/HCC)  
   
                                                      
  
  
Mixed hyperlipidemia  
   
                                                      
  
  
Postmenopausal bleeding  
  
documented in this encounter  
[facility]  
Work Phone: [phone] note*   
  
                                                    Diagnosis  
   
                                                      
  
  
Acute bronchitis, unspecified organism- Primary  
  
documented in this encounter  
[facility]  
Work Phone: [phone] note*   
  
                                                    Diagnosis  
   
                                                      
  
  
Irregular bleeding- Primary  
  
  
Irregular menstrual cycle  
   
                                                      
  
  
Depression, major, single episode, mild (CMS/HCC)  
   
                                                      
  
  
Mixed hyperlipidemia  
   
                                                      
  
  
Anxiety  
  
  
Anxiety state, unspecified  
   
                                                      
  
  
Other migraine without status migrainosus, not intractable  
   
                                                      
  
  
Taking medication for chronic disease  
  
documented in this encounter  
[facility]  
Work Phone: [phone] note*   
  
                                                    Diagnosis  
   
                                                      
  
  
Encounter for gynecological examination (general) (routine) without abnormal   
findings- Primary  
   
                                                      
  
  
Encounter for screening for malignant neoplasm of cervix  
  
  
Screening for malignant neoplasm of the cervix  
   
                                                      
  
  
Special screening examination for human papillomavirus (HPV)  
   
                                                      
  
  
PMB (postmenopausal bleeding)  
  
  
Postmenopausal bleeding  
   
                                                      
  
  
Intramural leiomyoma of uterus  
  
documented in this encounter  
[facility]   
  
                                                    Diagnosis  
   
                                                      
  
  
PMB (postmenopausal bleeding)  
  
  
Postmenopausal bleeding  
   
                                                      
  
  
Intramural leiomyoma of uterus  
  
documented in this encounter  
[facility]   
  
                                                    Diagnosis  
   
                                                      
  
  
PMB (postmenopausal bleeding)- Primary  
  
  
Postmenopausal bleeding  
   
                                                      
  
  
Endometrial thickening on ultrasound  
  
documented in this encounter  
[facility]   
  
                                                    Diagnosis  
   
                                                      
  
  
Endometrial thickening on ultrasound- Primary  
   
                                                      
  
  
Abnormal uterine bleeding (AUB)  
  
documented in this encounter  
[facility]   
  
                                                    Diagnosis  
   
                                                      
  
  
Encounter for screening mammogram for malignant neoplasm of breast- Primary  
   
                                                      
  
  
Eczema, unspecified type  
   
                                                      
  
  
Class 3 severe obesity due to excess calories without serious comorbidity with 
body   
mass index (BMI) of 40.0 to 44.9 in adult  
   
                                                      
  
  
Depression, major, single episode, mild (CMS-HCC)  
   
                                                      
  
  
Hyperglycemia  
  
  
Other abnormal glucose  
   
                                                      
  
  
Depression, major, single episode, mild (CMS-HCC)- Primary  
   
                                                      
  
  
Anxiety  
  
  
Anxiety state, unspecified  
   
                                                      
  
  
Hyperglycemia- Primary  
  
  
Other abnormal glucose  
   
                                                      
  
  
Depression, major, single episode, mild (CMS-HCC)  
   
                                                      
  
  
Mixed hyperlipidemia  
   
                                                      
  
  
Postmenopausal bleeding  
   
                                                      
  
  
Irregular bleeding- Primary  
  
  
Irregular menstrual cycle  
   
                                                      
  
  
Depression, major, single episode, mild (CMS-HCC)  
   
                                                      
  
  
Mixed hyperlipidemia  
   
                                                      
  
  
Anxiety  
  
  
Anxiety state, unspecified  
   
                                                      
  
  
Other migraine without status migrainosus, not intractable  
   
                                                      
  
  
Taking medication for chronic disease  
   
                                                      
  
  
Encounter for screening mammogram for malignant neoplasm of breast- Primary  
   
                                                      
  
  
Depression, major, single episode, mild (CMS-HCC)  
   
                                                      
  
  
Mixed hyperlipidemia  
   
                                                      
  
  
Anxiety  
  
  
Anxiety state, unspecified  
   
                                                      
  
  
Migraine without status migrainosus, not intractable, unspecified migraine type  
   
                                                      
  
  
Postmenopausal bleeding  
   
                                                      
  
  
Class 3 severe obesity due to excess calories without serious comorbidity with 
body   
mass index (BMI) of 40.0 to 44.9 in adult  
   
                                                      
  
  
Taking medication for chronic disease  
   
                                                      
  
  
Acute bronchitis, unspecified organism- Primary  
  
documented in this encounter  
[facility]  
Work Phone: [phone] note*   
  
                                                    Diagnosis  
   
                                                      
  
  
Abnormal uterine bleeding- Primary  
  
  
Unspecified disorder of menstruation and other abnormal bleeding from female 
genital   
tract  
  
documented in this encounter  
[facility]   
  
                                                    Diagnosis  
   
                                                      
  
  
Abnormal uterine bleeding- Primary  
  
  
Unspecified disorder of menstruation and other abnormal bleeding from female 
genital   
tract  
  
documented in this encounter  
[facility]   
  
                                                    Diagnosis  
   
                                                      
  
  
Abnormal uterine bleeding  
  
  
Unspecified disorder of menstruation and other abnormal bleeding from female 
genital   
tract  
  
documented in this encounter  
[facility]eroz is a 53-year-old 
woman who comes in referred from her PCP for an ultrasound that was done and 
showed thickened endometrium. Patient's hormone levels were checked a year and a
 half ago and shewas not menopausal. At that point we started birth control 
pills which she discontinued this spring. She had her hormone levels checked and
 they were mildly as low evaded suggesting that she was perimenopausal but she 
does not recall how long it had been since she had taken birth control pills 
before she had her blood work done. Patient has had a bleeding episode last 
month and again this month. She is wondering if she is menopausal or what could 
possibly be causing these recurrent episodes of bleeding.[facility]  
Work Phone: [phone] of Present illness Cathy comes in to 
discuss the results of her endometrial biopsy and the results of her blood work 
and ultrasound.[place] [address] [facility]  
Work Phone: [phone] for referral (narrative)* Consultation 
  (Routine) - Authorized  
  
                          Specialty    Diagnoses / Procedures Referred By Contac  
t Referred To Contact  
   
                                        Primary Care          
  
  
Diagnoses  
  
  
Eczema, unspecified type  
  
  
Class 3 severe obesity due   
to excess calories without   
serious comorbidity with   
body mass index (BMI) of   
40.0 to 44.9 in adult   
(CMS/HCC)  
  
  
Depression, major, single   
episode, mild (CMS/HCC)  
  
  
Hyperglycemia  
  
  
  
Procedures  
  
  
Follow Up In Primary Care                 
  
  
Janie Parish DO  
  
  
 [facility]   
[place] [address]  
  
  
Phone: [phone]  
  
  
Fax: [phone]                         
  
  
  
  
  
                    Referral ID Status    Reason    Start Date Expiration Date V  
isits   
Requested                               Visits   
Authorized  
   
                [id number]   Authorized         2023 10/2/2023 1       1  
  
  
  
  
Electronically signed by Janie Parish DO at 2023 7:33 AM EDT  
  
  
* Imaging (Routine) - Authorized  
  
                          Specialty    Diagnoses / Procedures Referred By Contac  
t Referred To Contact  
   
                                        Radiology             
  
  
Diagnoses  
  
  
Encounter for screening   
mammogram for malignant   
neoplasm of breast  
  
  
  
Procedures  
  
  
BI mammo bilateral screening   
tomosynthesis                             
  
  
Janie Parish DO  
  
  
 [place] [address]  
  
  
Phone: [phone]  
  
  
Fax: [phone]                         
  
  
  
  
  
                          Referral ID  Status       Reason       Start   
Date                                    Expiration   
Date                                    Visits   
Requested                               Visits   
Authorized  
   
                                [id number]           Authorized        
  
  
Perform   
Procedure       2023        10/2/2023       1               1  
  
  
  
  
Electronically signed by Janie Parish DO at 2023 7:33 AM EDT  
  
  
[facility]  
Work Phone: [phone] for referral (narrative)* Consultation 
  (Routine) - Authorized  
  
                          Specialty    Diagnoses / Procedures Referred By Contac  
t Referred To Contact  
   
                                        Primary Care          
  
  
Procedures  
  
  
Follow Up In Primary Care                 
  
  
Janie ParishDO  
  
  
 [facility]   
[place] [address]  
  
  
Phone: [phone]  
  
  
Fax: [phone]                         
  
  
  
  
  
                    Referral ID Status    Reason    Start Date Expiration Date V  
isits   
Requested                               Visits   
Authorized  
   
                [id number]  Authorized         2023 1       1  
  
  
  
  
Electronically signed by Janie S Royal VIRAMONTES at 2023 9:38 AM EDT  
  
  
[facility]  
Work Phone: [phone] for referral (narrative)* Consultation 
  (Routine) - Authorized  
  
                          Specialty    Diagnoses / Procedures Referred By Contac  
t Referred To Contact  
   
                                        Primary Care          
  
  
Procedures  
  
  
Follow Up In Primary Care                 
  
  
Janie ParishDO  
  
  
 [place] [address]  
  
  
Phone: [phone]  
  
  
Fax: [phone]                         
  
  
  
  
  
                    Referral ID Status    Reason    Start Date Expiration Date V  
isits   
Requested                               Visits   
Authorized  
   
                [id number] Authorized         2024 1       1  
  
  
  
  
Electronically signed by Janie Parish DO at 2024 7:22 AM EST  
  
  
* Consultation (Routine) - Authorized  
  
                          Specialty    Diagnoses / Procedures Referred By Contac  
t Referred To Contact  
   
                                                    Obstetrics and   
Gynecology                                
  
  
Diagnoses  
  
  
Irregular bleeding  
                                          
  
  
Janie ParishDO  
  
  
 [place] [address]  
  
  
Phone: [phone]  
  
  
Fax: [phone]                         
  
  
  
  
  
                          Referral ID  Status       Reason       Start   
Date                                    Expiration   
Date                                    Visits   
Requested                               Visits   
Authorized  
   
                                [id number]         Authorized        
  
  
Specialty   
Services   
Required        2024        1               1  
  
  
  
  
Electronically signed by Janie Parish DO at 2024 7:22 AM EST  
  
  
[facility]  
Work Phone: [phone] for referral (narrative)* Diagnostic Procedure 
  Only (Routine) - Pending Review  
  
                          Specialty    Diagnoses / Procedures Referred By Contac  
t Referred To Contact  
   
                                                    [facility]   
[facility]                                 
  
  
Diagnoses  
  
  
PMB (postmenopausal   
bleeding)  
  
  
Intramural leiomyoma of   
uterus  
  
  
  
Procedures  
  
  
PELVIC US WHI  
  
  
US PELVIC NONOBSTETRIC   
REAL-TIME IMAGE COMPLETE                  
  
  
[address] [place] [address]  
  
  
Phone: [phone]  
  
  
Fax: [phone]                         
  
  
[facility]  
  
  
[address] [place] [address]  
  
  
  
                          Referral ID  Status       Reason       Start   
Date                                    Expiration   
Date                                    Visits   
Requested                               Visits   
Authorized  
   
                                        [id number]            Pending   
Review                                    
  
  
Auto-Generat  
ed Referral     2024       1               1  
  
  
  
  
Electronically signed by Devon Ruiz MD at 2024 3:39 PM EDT  
  
  
[facility] for visit Narrative* Consultation (Routine) - Closed  
  
                          Specialty    Diagnoses / Procedures Referred By Contac  
t Referred To Contact  
   
                                        Primary Care          
  
  
Procedures  
  
  
Follow Up In Primary Care                 
  
  
Janie Parish DO  
  
  
[address] [facility]   
Office [place] [id number]  
  
  
Phone: [phone]  
  
  
Fax: [phone]                         
  
  
  
  
  
                Referral ID Status  Reason  Start Date Expiration Date Visits Re  
quested Visits   
Authorized  
   
                [id number]  Closed          2023 1       1  
  
  
  
[facility]  
Work Phone: [phone] for visit Narrative* Diagnostic Procedure Only 
  (Routine) - Closed  
  
                          Specialty    Diagnoses / Procedures Referred By Contac  
t Referred To Contact  
   
                                                    [facility]                                 
  
  
Diagnoses  
  
  
PMB (postmenopausal   
bleeding)  
  
  
Intramural leiomyoma of   
uterus  
  
  
  
Procedures  
  
  
PELVIC US WHI  
  
  
US PELVIC NONOBSTETRIC   
REAL-TIME IMAGE COMPLETE                  
  
  
[address] [place] [address]  
  
  
Phone: [phone]  
  
  
Fax: [phone]                         
  
  
[facility]  
  
  
[address] [place] [address]  
  
  
  
                    Referral ID Status    Reason    Start Date Expiration Date V  
isits   
Requested                               Visits   
Authorized  
   
                                [id number]        Closed            
  
  
Auto-Generate  
d Referral      2024      1               1  
  
  
  
[facility]  
  
Summary Purpose  
  
  
                                                      
  
  
  
Family History  
                              Unknown Family Member  
  
                                Name            Dates           Details  
   
                                                    Family history of depression  
: Mother(V17.0, Z81.8)  
                                                    Status:Active  
   
                                                    Family history of diabetes m  
ellitus: Mother, Father, Aunt(V18.0,   
Z83.3)  
                                                    Status:Active  
   
                                                    Family history of hypertensi  
on: Mother, Maternal Great   
Grandmother, Paternal Great Grandmother, Maternal Grandfather,   
Paternal Grandfather(V17.49, Z82.49)  
                                                    Status:Active  
   
                                                    Family history of thyroid di  
sease: Mother(V18.19, Z83.49)  
                                                    Status:Active  
   
                                                    Family history of alcoholism  
: Father(V17.0, Z81.1)  
                                                    Status:Active  
   
                                                    Family history of coronary a  
rtery disease: Father(V17.3, Z82.49)  
                                                    Status:Active  
   
                                                    Family history of cardiac di  
sorder: Maternal Great Grandmother,   
Paternal Great Grandmother, Maternal Grandfather, Paternal   
Grandfather(V17.49, Z82.49)  
                                                    Status:Active  
   
                                                    Family history of hyperchole  
sterolemia: Mother, Father,   
Brother(V18.19, Z83.42)  
                                                    Status:Active  
   
                                                    Family history of malignant   
neoplasm of brain: Maternal   
Grandmother(V16.8, Z80.8)  
                                                    Status:Active  
   
                                                    Family history of cerebrovas  
cular accident (CVA): Maternal Great   
Grandmother, Maternal Grandfather, Father(V17.1, Z82.3)  
                                                    Status:Active  
   
                                                    TIA (transient ischemic ana maria  
ck): Father  
                                                    Status:Active  
  
                              Unknown Family Member  
  
                                Name            Dates           Details  
   
                                                    Family history of depression  
: Mother(V17.0, Z81.8)  
                                                    Status:Active  
   
                                                    Family history of diabetes m  
ellitus: Mother, Father, Aunt(V18.0,   
Z83.3)  
                                                    Status:Active  
   
                                                    Family history of hypertensi  
on: Mother, Maternal Great   
Grandmother, Paternal Great Grandmother, Maternal Grandfather,   
Paternal Grandfather(V17.49, Z82.49)  
                                                    Status:Active  
   
                                                    Family history of thyroid di  
sease: Mother(V18.19, Z83.49)  
                                                    Status:Active  
   
                                                    Family history of alcoholism  
: Father(V17.0, Z81.1)  
                                                    Status:Active  
   
                                                    Family history of coronary a  
rtery disease: Father(V17.3, Z82.49)  
                                                    Status:Active  
   
                                                    Family history of cardiac di  
sorder: Maternal Great Grandmother,   
Paternal Great Grandmother, Maternal Grandfather, Paternal   
Grandfather(V17.49, Z82.49)  
                                                    Status:Active  
   
                                                    Family history of hyperchole  
sterolemia: Mother, Father,   
Brother(V18.19, Z83.42)  
                                                    Status:Active  
   
                                                    Family history of malignant   
neoplasm of brain: Maternal   
Grandmother(V16.8, Z80.8)  
                                                    Status:Active  
   
                                                    Family history of cerebrovas  
cular accident (CVA): Maternal Great   
Grandmother, Maternal Grandfather, Father(V17.1, Z82.3)  
                                                    Status:Active  
   
                                                    TIA (transient ischemic ana maria  
ck): Father  
                                                    Status:Active  
  
                              Unknown Family Member  
  
                                Name            Dates           Details  
   
                                                    Family history of depression  
: Mother(V17.0, Z81.8)  
                                                    Status:Active  
   
                                                    Family history of thyroid di  
sease: Mother(V18.19, Z83.49)  
                                                    Status:Active  
   
                                                    Family history of alcoholism  
: Father(V17.0, Z81.1)  
                                                    Status:Active  
   
                                                    Family history of coronary a  
rtery disease: Father(V17.3, Z82.49)  
                                                    Status:Active  
   
                                                    Family history of cardiac di  
sorder: Maternal Great Grandmother,   
Paternal Great Grandmother, Maternal Grandfather, Paternal   
Grandfather(V17.49, Z82.49)  
                                                    Status:Active  
   
                                                    Family history of hyperchole  
sterolemia: Mother, Father,   
Brother(V18.19, Z83.42)  
                                                    Status:Active  
   
                                                    Family history of malignant   
neoplasm of brain: Maternal   
Grandmother(V16.8, Z80.8)  
                                                    Status:Active  
   
                                                    Family history of cerebrovas  
cular accident (CVA): Maternal Great   
Grandmother, Maternal Grandfather, Father(V17.1, Z82.3)  
                                                    Status:Active  
   
                                                    TIA (transient ischemic ana maria  
ck): Father  
                                                    Status:Active  
   
                                                    Family history of hypertensi  
on: Mother, Maternal Great   
Grandmother, Paternal Great Grandmother, Maternal Grandfather,   
Paternal Grandfather(V17.49, Z82.49)  
                                                    Status:Active  
   
                                                    Family history of diabetes m  
ellitus: Mother, Father, Aunt(V18.0,   
Z83.3)  
                                                    Status:Active  
  
                              Unknown Family Member  
  
                                Name            Dates           Details  
   
                                                    TIA (transient ischemic ana maria  
ck): Father  
                                                    Status:Active  
   
                                                    Family history of cerebrovas  
cular accident (CVA): Maternal Great   
Grandmother, Maternal Grandfather, Father(V17.1, Z82.3)  
                                                    Status:Active  
   
                                                    Family history of malignant   
neoplasm of brain: Maternal   
Grandmother(V16.8, Z80.8)  
                                                    Status:Active  
   
                                                    Family history of hyperchole  
sterolemia: Mother, Father,   
Brother(V18.19, Z83.42)  
                                                    Status:Active  
   
                                                    Family history of cardiac di  
sorder: Maternal Great Grandmother,   
Paternal Great Grandmother, Maternal Grandfather, Paternal   
Grandfather(V17.49, Z82.49)  
                                                    Status:Active  
   
                                                    Family history of coronary a  
rtery disease: Father(V17.3, Z82.49)  
                                                    Status:Active  
   
                                                    Family history of alcoholism  
: Father(V17.0, Z81.1)  
                                                    Status:Active  
   
                                                    Family history of thyroid di  
sease: Mother(V18.19, Z83.49)  
                                                    Status:Active  
   
                                                    Family history of hypertensi  
on: Mother, Maternal Great   
Grandmother, Paternal Great Grandmother, Maternal Grandfather,   
Paternal Grandfather(V17.49, Z82.49)  
                                                    Status:Active  
   
                                                    Family history of diabetes m  
ellitus: Mother, Father, Aunt(V18.0,   
Z83.3)  
                                                    Status:Active  
   
                                                    Family history of depression  
: Mother(V17.0, Z81.8)  
                                                    Status:Active  
  
                              Unknown Family Member  
  
                                Name            Dates           Details  
   
                                                    Family history of depression  
: Mother(V17.0, Z81.8)  
                                                    Status:Active  
   
                                                    Family history of diabetes m  
ellitus: Mother, Father, Aunt(V18.0,   
Z83.3)  
                                                    Status:Active  
   
                                                    Family history of hypertensi  
on: Mother, Maternal Great   
Grandmother, Paternal Great Grandmother, Maternal Grandfather,   
Paternal Grandfather(V17.49, Z82.49)  
                                                    Status:Active  
   
                                                    Family history of thyroid di  
sease: Mother(V18.19, Z83.49)  
                                                    Status:Active  
   
                                                    Family history of alcoholism  
: Father(V17.0, Z81.1)  
                                                    Status:Active  
   
                                                    Family history of coronary a  
rtery disease: Father(V17.3, Z82.49)  
                                                    Status:Active  
   
                                                    Family history of cardiac di  
sorder: Maternal Great Grandmother,   
Paternal Great Grandmother, Maternal Grandfather, Paternal   
Grandfather(V17.49, Z82.49)  
                                                    Status:Active  
   
                                                    Family history of hyperchole  
sterolemia: Mother, Father,   
Brother(V18.19, Z83.42)  
                                                    Status:Active  
   
                                                    Family history of malignant   
neoplasm of brain: Maternal   
Grandmother(V16.8, Z80.8)  
                                                    Status:Active  
   
                                                    Family history of cerebrovas  
cular accident (CVA): Maternal Great   
Grandmother, Maternal Grandfather, Father(V17.1, Z82.3)  
                                                    Status:Active  
   
                                                    TIA (transient ischemic ana maria  
ck): Father  
                                                    Status:Active  
  
                              Unknown Family Member  
  
                                Name            Dates           Details  
   
                                                    Family history of depression  
: Mother(V17.0, Z81.8)  
                                                    Status:Active  
   
                                                    Family history of diabetes m  
ellitus: Mother, Father, Aunt(V18.0,   
Z83.3)  
                                                    Status:Active  
   
                                                    Family history of hypertensi  
on: Mother, Maternal Great   
Grandmother, Paternal Great Grandmother, Maternal Grandfather,   
Paternal Grandfather(V17.49, Z82.49)  
                                                    Status:Active  
   
                                                    Family history of thyroid di  
sease: Mother(V18.19, Z83.49)  
                                                    Status:Active  
   
                                                    Family history of alcoholism  
: Father(V17.0, Z81.1)  
                                                    Status:Active  
   
                                                    Family history of coronary a  
rtery disease: Father(V17.3, Z82.49)  
                                                    Status:Active  
   
                                                    Family history of cardiac di  
sorder: Maternal Great Grandmother,   
Paternal Great Grandmother, Maternal Grandfather, Paternal   
Grandfather(V17.49, Z82.49)  
                                                    Status:Active  
   
                                                    Family history of hyperchole  
sterolemia: Mother, Father,   
Brother(V18.19, Z83.42)  
                                                    Status:Active  
   
                                                    Family history of malignant   
neoplasm of brain: Maternal   
Grandmother(V16.8, Z80.8)  
                                                    Status:Active  
   
                                                    Family history of cerebrovas  
cular accident (CVA): Maternal Great   
Grandmother, Maternal Grandfather, Father(V17.1, Z82.3)  
                                                    Status:Active  
   
                                                    TIA (transient ischemic ana maria  
ck): Father  
                                                    Status:Active  
  
                              Unknown Family Member  
  
                                Name            Dates           Details  
   
                                                    Family history of malignant   
neoplasm of brain: Maternal   
Grandmother(V16.8, Z80.8)  
                                                    Status:Active  
   
                                                    Family history of hyperchole  
sterolemia: Mother, Father,   
Brother(V18.19, Z83.42)  
                                                    Status:Active  
   
                                                    Family history of cardiac di  
sorder: Maternal Great Grandmother,   
Paternal Great Grandmother, Maternal Grandfather, Paternal   
Grandfather(V17.49, Z82.49)  
                                                    Status:Active  
   
                                                    Family history of coronary a  
rtery disease: Father(V17.3, Z82.49)  
                                                    Status:Active  
   
                                                    Family history of alcoholism  
: Father(V17.0, Z81.1)  
                                                    Status:Active  
   
                                                    Family history of thyroid di  
sease: Mother(V18.19, Z83.49)  
                                                    Status:Active  
   
                                                    Family history of hypertensi  
on: Mother, Maternal Great   
Grandmother, Paternal Great Grandmother, Maternal Grandfather,   
Paternal Grandfather(V17.49, Z82.49)  
                                                    Status:Active  
   
                                                    Family history of diabetes m  
ellitus: Mother, Father, Aunt(V18.0,   
Z83.3)  
                                                    Status:Active  
   
                                                    Family history of depression  
: Mother(V17.0, Z81.8)  
                                                    Status:Active  
   
                                                    TIA (transient ischemic ana maria  
ck): Father  
                                                    Status:Active  
   
                                                    Family history of cerebrovas  
cular accident (CVA): Maternal Great   
Grandmother, Maternal Grandfather, Father(V17.1, Z82.3)  
                                                    Status:Active  
  
                              Unknown Family Member  
  
                                Name            Dates           Details  
   
                                                    Family history of depression  
: Mother(V17.0, Z81.8)  
                                                    Status:Active  
   
                                                    Family history of diabetes m  
ellitus: Mother, Father, Aunt(V18.0,   
Z83.3)  
                                                    Status:Active  
   
                                                    Family history of hypertensi  
on: Mother, Maternal Great   
Grandmother, Paternal Great Grandmother, Maternal Grandfather,   
Paternal Grandfather(V17.49, Z82.49)  
                                                    Status:Active  
   
                                                    Family history of thyroid di  
sease: Mother(V18.19, Z83.49)  
                                                    Status:Active  
   
                                                    Family history of alcoholism  
: Father(V17.0, Z81.1)  
                                                    Status:Active  
   
                                                    Family history of coronary a  
rtery disease: Father(V17.3, Z82.49)  
                                                    Status:Active  
   
                                                    Family history of cardiac di  
sorder: Maternal Great Grandmother,   
Paternal Great Grandmother, Maternal Grandfather, Paternal   
Grandfather(V17.49, Z82.49)  
                                                    Status:Active  
   
                                                    Family history of hyperchole  
sterolemia: Mother, Father,   
Brother(V18.19, Z83.42)  
                                                    Status:Active  
   
                                                    Family history of malignant   
neoplasm of brain: Maternal   
Grandmother(V16.8, Z80.8)  
                                                    Status:Active  
   
                                                    Family history of cerebrovas  
cular accident (CVA): Maternal Great   
Grandmother, Maternal Grandfather, Father(V17.1, Z82.3)  
                                                    Status:Active  
   
                                                    TIA (transient ischemic ana maria  
ck): Father  
                                                    Status:Active  
  
                              Unknown Family Member  
  
                                Name            Dates           Details  
   
                                                    Family history of depression  
: Mother(V17.0, Z81.8)  
                                                    Status:Active  
   
                                                    Family history of diabetes m  
ellitus: Mother, Father, Aunt(V18.0,   
Z83.3)  
                                                    Status:Active  
   
                                                    Family history of hypertensi  
on: Mother, Maternal Great   
Grandmother, Paternal Great Grandmother, Maternal Grandfather,   
Paternal Grandfather(V17.49, Z82.49)  
                                                    Status:Active  
   
                                                    Family history of thyroid di  
sease: Mother(V18.19, Z83.49)  
                                                    Status:Active  
   
                                                    Family history of alcoholism  
: Father(V17.0, Z81.1)  
                                                    Status:Active  
   
                                                    Family history of coronary a  
rtery disease: Father(V17.3, Z82.49)  
                                                    Status:Active  
   
                                                    Family history of cardiac di  
sorder: Maternal Great Grandmother,   
Paternal Great Grandmother, Maternal Grandfather, Paternal   
Grandfather(V17.49, Z82.49)  
                                                    Status:Active  
   
                                                    Family history of hyperchole  
sterolemia: Mother, Father,   
Brother(V18.19, Z83.42)  
                                                    Status:Active  
   
                                                    Family history of malignant   
neoplasm of brain: Maternal   
Grandmother(V16.8, Z80.8)  
                                                    Status:Active  
   
                                                    Family history of cerebrovas  
cular accident (CVA): Maternal Great   
Grandmother, Maternal Grandfather, Father(V17.1, Z82.3)  
                                                    Status:Active  
   
                                                    TIA (transient ischemic ana maria  
ck): Father  
                                                    Status:Active  
  
                              Unknown Family Member  
  
                                Name            Dates           Details  
   
                                                    Family history of depression  
: Mother(V17.0, Z81.8)  
                                                    Status:Active  
   
                                                    Family history of diabetes m  
ellitus: Mother, Father, Aunt(V18.0,   
Z83.3)  
                                                    Status:Active  
   
                                                    Family history of hypertensi  
on: Mother, Maternal Great   
Grandmother, Paternal Great Grandmother, Maternal Grandfather,   
Paternal Grandfather(V17.49, Z82.49)  
                                                    Status:Active  
   
                                                    Family history of thyroid di  
sease: Mother(V18.19, Z83.49)  
                                                    Status:Active  
   
                                                    Family history of alcoholism  
: Father(V17.0, Z81.1)  
                                                    Status:Active  
   
                                                    Family history of coronary a  
rtery disease: Father(V17.3, Z82.49)  
                                                    Status:Active  
   
                                                    Family history of cardiac di  
sorder: Maternal Great Grandmother,   
Paternal Great Grandmother, Maternal Grandfather, Paternal   
Grandfather(V17.49, Z82.49)  
                                                    Status:Active  
   
                                                    Family history of hyperchole  
sterolemia: Mother, Father,   
Brother(V18.19, Z83.42)  
                                                    Status:Active  
   
                                                    Family history of malignant   
neoplasm of brain: Maternal   
Grandmother(V16.8, Z80.8)  
                                                    Status:Active  
   
                                                    Family history of cerebrovas  
cular accident (CVA): Maternal Great   
Grandmother, Maternal Grandfather, Father(V17.1, Z82.3)  
                                                    Status:Active  
   
                                                    TIA (transient ischemic ana maria  
ck): Father  
                                                    Status:Active  
  
                              Unknown Family Member  
  
                                Name            Dates           Details  
   
                                                    Family history of depression  
: Mother(V17.0, Z81.8)  
                                                    Status:Active  
   
                                                    Family history of diabetes m  
ellitus: Mother, Father, Aunt(V18.0,   
Z83.3)  
                                                    Status:Active  
   
                                                    Family history of hypertensi  
on: Mother, Maternal Great   
Grandmother, Paternal Great Grandmother, Maternal Grandfather,   
Paternal Grandfather(V17.49, Z82.49)  
                                                    Status:Active  
   
                                                    Family history of thyroid di  
sease: Mother(V18.19, Z83.49)  
                                                    Status:Active  
   
                                                    Family history of alcoholism  
: Father(V17.0, Z81.1)  
                                                    Status:Active  
   
                                                    Family history of coronary a  
rtery disease: Father(V17.3, Z82.49)  
                                                    Status:Active  
   
                                                    Family history of cardiac di  
sorder: Maternal Great Grandmother,   
Paternal Great Grandmother, Maternal Grandfather, Paternal   
Grandfather(V17.49, Z82.49)  
                                                    Status:Active  
   
                                                    Family history of hyperchole  
sterolemia: Mother, Father,   
Brother(V18.19, Z83.42)  
                                                    Status:Active  
   
                                                    Family history of malignant   
neoplasm of brain: Maternal   
Grandmother(V16.8, Z80.8)  
                                                    Status:Active  
   
                                                    Family history of cerebrovas  
cular accident (CVA): Maternal Great   
Grandmother, Maternal Grandfather, Father(V17.1, Z82.3)  
                                                    Status:Active  
   
                                                    TIA (transient ischemic ana maria  
ck): Father  
                                                    Status:Active  
  
                              Unknown Family Member  
  
                                Name            Dates           Details  
   
                                                    Family history of depression  
: Mother(V17.0, Z81.8)  
                                                    Status:Active  
   
                                                    Family history of diabetes m  
ellitus: Mother, Father, Aunt(V18.0,   
Z83.3)  
                                                    Status:Active  
   
                                                    Family history of hypertensi  
on: Mother, Maternal Great   
Grandmother, Paternal Great Grandmother, Maternal Grandfather,   
Paternal Grandfather(V17.49, Z82.49)  
                                                    Status:Active  
   
                                                    Family history of thyroid di  
sease: Mother(V18.19, Z83.49)  
                                                    Status:Active  
   
                                                    Family history of alcoholism  
: Father(V17.0, Z81.1)  
                                                    Status:Active  
   
                                                    Family history of coronary a  
rtery disease: Father(V17.3, Z82.49)  
                                                    Status:Active  
   
                                                    Family history of cardiac di  
sorder: Maternal Great Grandmother,   
Paternal Great Grandmother, Maternal Grandfather, Paternal   
Grandfather(V17.49, Z82.49)  
                                                    Status:Active  
   
                                                    Family history of hyperchole  
sterolemia: Mother, Father,   
Brother(V18.19, Z83.42)  
                                                    Status:Active  
   
                                                    Family history of malignant   
neoplasm of brain: Maternal   
Grandmother(V16.8, Z80.8)  
                                                    Status:Active  
   
                                                    Family history of cerebrovas  
cular accident (CVA): Maternal Great   
Grandmother, Maternal Grandfather, Father(V17.1, Z82.3)  
                                                    Status:Active  
   
                                                    TIA (transient ischemic ana maria  
ck): Father  
                                                    Status:Active  
  
                              Unknown Family Member  
  
                                Name            Dates           Details  
   
                                                    Family history of depression  
: Mother(V17.0, Z81.8)  
                                                    Status:Active  
   
                                                    Family history of diabetes m  
ellitus: Mother, Father, Aunt(V18.0,   
Z83.3)  
                                                    Status:Active  
   
                                                    Family history of hypertensi  
on: Mother, Maternal Great   
Grandmother, Paternal Great Grandmother, Maternal Grandfather,   
Paternal Grandfather(V17.49, Z82.49)  
                                                    Status:Active  
   
                                                    Family history of thyroid di  
sease: Mother(V18.19, Z83.49)  
                                                    Status:Active  
   
                                                    Family history of alcoholism  
: Father(V17.0, Z81.1)  
                                                    Status:Active  
   
                                                    Family history of coronary a  
rtery disease: Father(V17.3, Z82.49)  
                                                    Status:Active  
   
                                                    Family history of cardiac di  
sorder: Maternal Great Grandmother,   
Paternal Great Grandmother, Maternal Grandfather, Paternal   
Grandfather(V17.49, Z82.49)  
                                                    Status:Active  
   
                                                    Family history of hyperchole  
sterolemia: Mother, Father,   
Brother(V18.19, Z83.42)  
                                                    Status:Active  
   
                                                    Family history of malignant   
neoplasm of brain: Maternal   
Grandmother(V16.8, Z80.8)  
                                                    Status:Active  
   
                                                    Family history of cerebrovas  
cular accident (CVA): Maternal Great   
Grandmother, Maternal Grandfather, Father(V17.1, Z82.3)  
                                                    Status:Active  
   
                                                    TIA (transient ischemic ana maria  
ck): Father  
                                                    Status:Active  
  
                              Unknown Family Member  
  
                                Name            Dates           Details  
   
                                                    Family history of depression  
: Mother(V17.0, Z81.8)  
                                                    Status:Active  
   
                                                    Family history of diabetes m  
ellitus: Mother, Father, Aunt(V18.0,   
Z83.3)  
                                                    Status:Active  
   
                                                    Family history of hypertensi  
on: Mother, Maternal Great   
Grandmother, Paternal Great Grandmother, Maternal Grandfather,   
Paternal Grandfather(V17.49, Z82.49)  
                                                    Status:Active  
   
                                                    Family history of thyroid di  
sease: Mother(V18.19, Z83.49)  
                                                    Status:Active  
   
                                                    Family history of alcoholism  
: Father(V17.0, Z81.1)  
                                                    Status:Active  
   
                                                    Family history of coronary a  
rtery disease: Father(V17.3, Z82.49)  
                                                    Status:Active  
   
                                                    Family history of cardiac di  
sorder: Maternal Great Grandmother,   
Paternal Great Grandmother, Maternal Grandfather, Paternal   
Grandfather(V17.49, Z82.49)  
                                                    Status:Active  
   
                                                    Family history of hyperchole  
sterolemia: Mother, Father,   
Brother(V18.19, Z83.42)  
                                                    Status:Active  
   
                                                    Family history of malignant   
neoplasm of brain: Maternal   
Grandmother(V16.8, Z80.8)  
                                                    Status:Active  
   
                                                    Family history of cerebrovas  
cular accident (CVA): Maternal Great   
Grandmother, Maternal Grandfather, Father(V17.1, Z82.3)  
                                                    Status:Active  
   
                                                    TIA (transient ischemic ana maria  
ck): Father  
                                                    Status:Active  
  
                              Unknown Family Member  
  
                                Name            Dates           Details  
   
                                                    Family history of depression  
: Mother(V17.0, Z81.8)  
                                                    Status:Active  
   
                                                    Family history of diabetes m  
ellitus: Mother, Father, Aunt(V18.0,   
Z83.3)  
                                                    Status:Active  
   
                                                    Family history of hypertensi  
on: Mother, Maternal Great   
Grandmother, Paternal Great Grandmother, Maternal Grandfather,   
Paternal Grandfather(V17.49, Z82.49)  
                                                    Status:Active  
   
                                                    Family history of thyroid di  
sease: Mother(V18.19, Z83.49)  
                                                    Status:Active  
   
                                                    Family history of alcoholism  
: Father(V17.0, Z81.1)  
                                                    Status:Active  
   
                                                    Family history of coronary a  
rtery disease: Father(V17.3, Z82.49)  
                                                    Status:Active  
   
                                                    Family history of cardiac di  
sorder: Maternal Great Grandmother,   
Paternal Great Grandmother, Maternal Grandfather, Paternal   
Grandfather(V17.49, Z82.49)  
                                                    Status:Active  
   
                                                    Family history of hyperchole  
sterolemia: Mother, Father,   
Brother(V18.19, Z83.42)  
                                                    Status:Active  
   
                                                    Family history of malignant   
neoplasm of brain: Maternal   
Grandmother(V16.8, Z80.8)  
                                                    Status:Active  
   
                                                    Family history of cerebrovas  
cular accident (CVA): Maternal Great   
Grandmother, Maternal Grandfather, Father(V17.1, Z82.3)  
                                                    Status:Active  
   
                                                    TIA (transient ischemic ana maria  
ck): Father  
                                                    Status:Active  
  
                              Unknown Family Member  
  
                                Name            Dates           Details  
   
                                                    Family history of depression  
: Mother(V17.0, Z81.8)  
                                                    Status:Active  
   
                                                    Family history of diabetes m  
ellitus: Mother, Father, Aunt(V18.0,   
Z83.3)  
                                                    Status:Active  
   
                                                    Family history of hypertensi  
on: Mother, Maternal Great   
Grandmother, Paternal Great Grandmother, Maternal Grandfather,   
Paternal Grandfather(V17.49, Z82.49)  
                                                    Status:Active  
   
                                                    Family history of thyroid di  
sease: Mother(V18.19, Z83.49)  
                                                    Status:Active  
   
                                                    Family history of alcoholism  
: Father(V17.0, Z81.1)  
                                                    Status:Active  
   
                                                    Family history of coronary a  
rtery disease: Father(V17.3, Z82.49)  
                                                    Status:Active  
   
                                                    Family history of cardiac di  
sorder: Maternal Great Grandmother,   
Paternal Great Grandmother, Maternal Grandfather, Paternal   
Grandfather(V17.49, Z82.49)  
                                                    Status:Active  
   
                                                    Family history of hyperchole  
sterolemia: Mother, Father,   
Brother(V18.19, Z83.42)  
                                                    Status:Active  
   
                                                    Family history of malignant   
neoplasm of brain: Maternal   
Grandmother(V16.8, Z80.8)  
                                                    Status:Active  
   
                                                    Family history of cerebrovas  
cular accident (CVA): Maternal Great   
Grandmother, Maternal Grandfather, Father(V17.1, Z82.3)  
                                                    Status:Active  
   
                                                    TIA (transient ischemic ana maria  
ck): Father  
                                                    Status:Active  
  
                              Unknown Family Member  
  
                                Name            Dates           Details  
   
                                                    Family history of depression  
: Mother(V17.0, Z81.8)  
                                                    Status:Active  
   
                                                    Family history of diabetes m  
ellitus: Mother, Father, Aunt(V18.0,   
Z83.3)  
                                                    Status:Active  
   
                                                    Family history of hypertensi  
on: Mother, Maternal Great   
Grandmother, Paternal Great Grandmother, Maternal Grandfather,   
Paternal Grandfather(V17.49, Z82.49)  
                                                    Status:Active  
   
                                                    Family history of thyroid di  
sease: Mother(V18.19, Z83.49)  
                                                    Status:Active  
   
                                                    Family history of alcoholism  
: Father(V17.0, Z81.1)  
                                                    Status:Active  
   
                                                    Family history of coronary a  
rtery disease: Father(V17.3, Z82.49)  
                                                    Status:Active  
   
                                                    Family history of cardiac di  
sorder: Maternal Great Grandmother,   
Paternal Great Grandmother, Maternal Grandfather, Paternal   
Grandfather(V17.49, Z82.49)  
                                                    Status:Active  
   
                                                    Family history of hyperchole  
sterolemia: Mother, Father,   
Brother(V18.19, Z83.42)  
                                                    Status:Active  
   
                                                    Family history of malignant   
neoplasm of brain: Maternal   
Grandmother(V16.8, Z80.8)  
                                                    Status:Active  
   
                                                    Family history of cerebrovas  
cular accident (CVA): Maternal Great   
Grandmother, Maternal Grandfather, Father(V17.1, Z82.3)  
                                                    Status:Active  
   
                                                    TIA (transient ischemic ana maria  
ck): Father  
                                                    Status:Active  
  
                              Unknown Family Member  
  
                                Name            Dates           Details  
   
                                                    Family history of depression  
: Mother(V17.0, Z81.8)  
                                                    Status:Active  
   
                                                    Family history of diabetes m  
ellitus: Mother, Father, Aunt(V18.0,   
Z83.3)  
                                                    Status:Active  
   
                                                    Family history of hypertensi  
on: Mother, Maternal Great   
Grandmother, Paternal Great Grandmother, Maternal Grandfather,   
Paternal Grandfather(V17.49, Z82.49)  
                                                    Status:Active  
   
                                                    Family history of thyroid di  
sease: Mother(V18.19, Z83.49)  
                                                    Status:Active  
   
                                                    Family history of alcoholism  
: Father(V17.0, Z81.1)  
                                                    Status:Active  
   
                                                    Family history of coronary a  
rtery disease: Father(V17.3, Z82.49)  
                                                    Status:Active  
   
                                                    Family history of cardiac di  
sorder: Maternal Great Grandmother,   
Paternal Great Grandmother, Maternal Grandfather, Paternal   
Grandfather(V17.49, Z82.49)  
                                                    Status:Active  
   
                                                    Family history of hyperchole  
sterolemia: Mother, Father,   
Brother(V18.19, Z83.42)  
                                                    Status:Active  
   
                                                    Family history of malignant   
neoplasm of brain: Maternal   
Grandmother(V16.8, Z80.8)  
                                                    Status:Active  
   
                                                    Family history of cerebrovas  
cular accident (CVA): Maternal Great   
Grandmother, Maternal Grandfather, Father(V17.1, Z82.3)  
                                                    Status:Active  
   
                                                    TIA (transient ischemic ana maria  
ck): Father  
                                                    Status:Active  
  
                              Unknown Family Member  
  
                                Name            Dates           Details  
   
                                                    Family history of depression  
: Mother(V17.0, Z81.8)  
                                                    Status:Active  
   
                                                    Family history of diabetes m  
ellitus: Mother, Father, Aunt(V18.0,   
Z83.3)  
                                                    Status:Active  
   
                                                    Family history of hypertensi  
on: Mother, Maternal Great   
Grandmother, Paternal Great Grandmother, Maternal Grandfather,   
Paternal Grandfather(V17.49, Z82.49)  
                                                    Status:Active  
   
                                                    Family history of thyroid di  
sease: Mother(V18.19, Z83.49)  
                                                    Status:Active  
   
                                                    Family history of alcoholism  
: Father(V17.0, Z81.1)  
                                                    Status:Active  
   
                                                    Family history of coronary a  
rtery disease: Father(V17.3, Z82.49)  
                                                    Status:Active  
   
                                                    Family history of cardiac di  
sorder: Maternal Great Grandmother,   
Paternal Great Grandmother, Maternal Grandfather, Paternal   
Grandfather(V17.49, Z82.49)  
                                                    Status:Active  
   
                                                    Family history of hyperchole  
sterolemia: Mother, Father,   
Brother(V18.19, Z83.42)  
                                                    Status:Active  
   
                                                    Family history of malignant   
neoplasm of brain: Maternal   
Grandmother(V16.8, Z80.8)  
                                                    Status:Active  
   
                                                    Family history of cerebrovas  
cular accident (CVA): Maternal Great   
Grandmother, Maternal Grandfather, Father(V17.1, Z82.3)  
                                                    Status:Active  
   
                                                    TIA (transient ischemic ana maria  
ck): Father  
                                                    Status:Active  
  
                              Unknown Family Member  
  
                                Name            Dates           Details  
   
                                                    Family history of depression  
: Mother(V17.0, Z81.8)  
                                                    Status:Active  
   
                                                    Family history of diabetes m  
ellitus: Mother, Father, Aunt(V18.0,   
Z83.3)  
                                                    Status:Active  
   
                                                    Family history of hypertensi  
on: Mother, Maternal Great   
Grandmother, Paternal Great Grandmother, Maternal Grandfather,   
Paternal Grandfather(V17.49, Z82.49)  
                                                    Status:Active  
   
                                                    Family history of thyroid di  
sease: Mother(V18.19, Z83.49)  
                                                    Status:Active  
   
                                                    Family history of alcoholism  
: Father(V17.0, Z81.1)  
                                                    Status:Active  
   
                                                    Family history of coronary a  
rtery disease: Father(V17.3, Z82.49)  
                                                    Status:Active  
   
                                                    Family history of cardiac di  
sorder: Maternal Great Grandmother,   
Paternal Great Grandmother, Maternal Grandfather, Paternal   
Grandfather(V17.49, Z82.49)  
                                                    Status:Active  
   
                                                    Family history of hyperchole  
sterolemia: Mother, Father,   
Brother(V18.19, Z83.42)  
                                                    Status:Active  
   
                                                    Family history of malignant   
neoplasm of brain: Maternal   
Grandmother(V16.8, Z80.8)  
                                                    Status:Active  
   
                                                    Family history of cerebrovas  
cular accident (CVA): Maternal Great   
Grandmother, Maternal Grandfather, Father(V17.1, Z82.3)  
                                                    Status:Active  
   
                                                    TIA (transient ischemic ana maria  
ck): Father  
                                                    Status:Active  
  
                              Unknown Family Member  
  
                                Name            Dates           Details  
   
                                                    Family history of depression  
: Mother(V17.0, Z81.8)  
                                                    Status:Active  
   
                                                    Family history of diabetes m  
ellitus: Mother, Father, Aunt(V18.0,   
Z83.3)  
                                                    Status:Active  
   
                                                    Family history of hypertensi  
on: Mother, Maternal Great   
Grandmother, Paternal Great Grandmother, Maternal Grandfather,   
Paternal Grandfather(V17.49, Z82.49)  
                                                    Status:Active  
   
                                                    Family history of thyroid di  
sease: Mother(V18.19, Z83.49)  
                                                    Status:Active  
   
                                                    Family history of alcoholism  
: Father(V17.0, Z81.1)  
                                                    Status:Active  
   
                                                    Family history of coronary a  
rtery disease: Father(V17.3, Z82.49)  
                                                    Status:Active  
   
                                                    Family history of cardiac di  
sorder: Maternal Great Grandmother,   
Paternal Great Grandmother, Maternal Grandfather, Paternal   
Grandfather(V17.49, Z82.49)  
                                                    Status:Active  
   
                                                    Family history of hyperchole  
sterolemia: Mother, Father,   
Brother(V18.19, Z83.42)  
                                                    Status:Active  
   
                                                    Family history of malignant   
neoplasm of brain: Maternal   
Grandmother(V16.8, Z80.8)  
                                                    Status:Active  
   
                                                    Family history of cerebrovas  
cular accident (CVA): Maternal Great   
Grandmother, Maternal Grandfather, Father(V17.1, Z82.3)  
                                                    Status:Active  
   
                                                    TIA (transient ischemic ana maria  
ck): Father  
                                                    Status:Active  
  
  
  
Advance Directives  
No Advanced Directives Records FoundNo Advanced Directives Records FoundNo 
Advanced Directives Records FoundNo Advanced Directives Records FoundNo Advanced
 Directives Records FoundNo Advanced Directives Records FoundNo Advanced 
Directives Records FoundNo Advanced Directives Records FoundNo Advanced
Directives Records Found  
  
Chief Complaint  
1 month f/u, medication is causing vivid dreams, not sleeping, exhausted.* Pt is
   here today to get est with new PCP, C/O menopause issues. This note was 
  generated by using Dragon software. It may contain errors in wording, 
  punctuate, or spelling.  
* She is here today to get established and she explains that she has been having
   some menopause-like symptoms that at times are quite bothersome. She states 
  she is still having menstrual cycles but they are sometimes erratic and also 
  sometimes she has very heavy bleeding. We estimate that her last Pap 
  examination was approximately 3 years ago and we talked about getting into a 
  gynecologist for a full checkup and to discuss perimenopause and its 
  treatment. She states she also has had a history of migraine headaches and she
   feels like recently they have gotten worse. She has taken Imitrex but shewas 
  also prescribed propanolol and Topamax in the past which she did not tolerate 
  very well. She states she took 1 dose of the Topamax and she felt very groggy 
  for basically a day. She states she is fearful of taking it again. We talked 
  about other preventative medications and so forth. We also conducted a review 
  of systems and we went over her lab test results from May. She did have a 
  significantly elevated cholesterol profile and she states she does have a 
  family history of heart disease. We talked about doing a coronary calcium scan
   to see if she has evidence of heart disease and have alsosuggested she may 
  benefit from doing a Lifeline screen. She also had a mildly elevated blood 
  sugar so we will be doing some follow-up lab work for that. She is also due 
  for screening mammogram and isagreeable to getting it scheduled. She also had 
  an elevated alkaline phosphatase which we will be checking again. She also has
   been told that she has snored at night and she does have risk factors for 
  sleep apnea. She has a full neck and she also has enlarged tonsils. I will be 
  doing a nocturnal pulse oximetry screen. We will see her back after completion
   of testing.  
* Pt is here today to get est with new PCP, C/O menopause issues. This note was 
  generated by using Dragon software. It may contain errors in wording, 
  punctuate, or spelling.  
* She is here today to get established and she explains that she has been having
   some menopause-like symptoms that at times are quite bothersome. She states 
  she is still having menstrual cycles but they are sometimes erratic and also 
  sometimes she has very heavy bleeding. We estimate that her last Pap 
  examination was approximately 3 years ago and we talked about getting into a 
  gynecologist for a full checkup and to discuss perimenopause and its 
  treatment. She states she also has had a history of migraine headaches and she
   feels like recently they have gotten worse. She has taken Imitrex but shewas 
  also prescribed propanolol and Topamax in the past which she did not tolerate 
  very well. She states she took 1 dose of the Topamax and she felt very groggy 
  for basically a day. She states she is fearful of taking it again. We talked 
  about other preventative medications and so forth. We also conducted a review 
  of systems and we went over her lab test results from May. She did have a 
  significantly elevated cholesterol profile and she states she does have a 
  family history of heart disease. We talked about doing a coronary calcium scan
   to see if she has evidence of heart disease and have alsosuggested she may 
  benefit from doing a Lifeline screen. She also had a mildly elevated blood 
  sugar so we will be doing some follow-up lab work for that. She is also due 
  for screening mammogram and isagreeable to getting it scheduled. She also had 
  an elevated alkaline phosphatase which we will be checking again. She also has
   been told that she has snored at night and she does have risk factors for 
  sleep apnea. She has a full neck and she also has enlarged tonsils. I will be 
  doing a nocturnal pulse oximetry screen. We will see her back after completion
   of testing.  
* Pt is here today to get est with new PCP, C/O menopause issues. This note was 
  generated by using Dragon software. It may contain errors in wording, 
  punctuate, or spelling.  
* She is here today to get established and she explains that she has been having
   some menopause-like symptoms that at times are quite bothersome. She states 
  she is still having menstrual cycles but they are sometimes erratic and also 
  sometimes she has very heavy bleeding. We estimate that her last Pap 
  examination was approximately 3 years ago and we talked about getting into a 
  gynecologist for a full checkup and to discuss perimenopause and its 
  treatment. She states she also has had a history of migraine headaches and she
   feels like recently they have gotten worse. She has taken Imitrex but shewas 
  also prescribed propanolol and Topamax in the past which she did not tolerate 
  very well. She states she took 1 dose of the Topamax and she felt very groggy 
  for basically a day. She states she is fearful of taking it again. We talked 
  about other preventative medications and so forth. We also conducted a review 
  of systems and we went over her lab test results from May. She did have a 
  significantly elevated cholesterol profile and she states she does have a 
  family history of heart disease. We talked about doing a coronary calcium scan
   to see if she has evidence of heart disease and have alsosuggested she may 
  benefit from doing a Lifeline screen. She also had a mildly elevated blood 
  sugar so we will be doing some follow-up lab work for that. She is also due 
  for screening mammogram and isagreeable to getting it scheduled. She also had 
  an elevated alkaline phosphatase which we will be checking again. She also has
   been told that she has snored at night and she does have risk factors for 
  sleep apnea. She has a full neck and she also has enlarged tonsils. I will be 
  doing a nocturnal pulse oximetry screen. We will see her back after completion
   of testing.  
* Pt is here today for a follow up on her Cardiac CT and Nocturnal pulse ox. 
  This note was generated by using Dragon software. It may contain errors in 
  wording, punctuate, or spelling.  
* She is here today for follow-up. Since her last visit she was seen by her 
  gynecologist and placed on a low-dose hormone therapy. She states that she is 
  not quite yet at menopause but will be trying to get symptoms under control. 
  We also conducted a review of systems. She states her fatigue has improved. 
  She also received this season's flu vaccine in October from her workplace. We 
  went over the results of her coronary calcium scan which came back with a 
  composite score of 0. We explained the implications of these findings. She 
  does have a family history of heart disease so we want to continue to 
  aggressively modify risk factors. We also discussed doing a Lifeline screen. 
  We will be checking her cholesterol again in the summer. We had intended for 
  her to get follow-up lab work prior to thisvisit but she misunderstood and 
  when she went to the lab for her gynecologist it was not drawn. Tricia get it
   this morning on the way out and I have agreed to call her with results. If 
  everything is satisfactory then her next follow-up would be likely in 6 
  months. If there are significant abnormalities we may need to discuss it 
  sooner. She also has lost some weight since we saw her last time. We also went
   over the results of her nocturnal pulse oximetry screen and there were some 
  significant abnormalities. Her mean low was 90.9% and her recorded low was 
  84%. She had 118 desaturation events and she had 7 that lasted 3 minutes or 
  longer. She has been observed to have snoring and so forth. Leandro strongly 
  suspect obstructive sleep apnea and we talked about doing a formal sleep 
  study. I gave her a handout that explains the disorder and why we want to 
  treated if it exists. She does understand that weight loss can often times 
  help improve sleep apnea or help it resolve once 1 gets down to ideal body 
  weight. She states she wants to work on weight loss endeavors and when we see 
  her back in the summer we can assess her progress and whether she should have 
  a sleep study. She also states shehas been doing well on her sertraline. She 
  also has a history of migraine headaches which have beenunder good control.  
* Pt presents for lab and med review. This note was generated by using Dragon 
  software. It may contain errors in wording, punctuate, or spelling.  
* She is here today for her routine checkup. When she arrived her blood pressure
   was a bit generous. We checked it immediately upon arrival. After sitting for
   a while it did come down a few points. We discussed doing some blood pressure
   monitoring at home when she has the opportunity to sit and relaxfor 20 
  minutes. We also conducted a full review of systems. We discussed her migraine
   headaches. For the most part she is doing well and we are providing a refill 
  on sumatriptan hand. She states thather migraines are variable and sometimes 
  she goes for a month without 1 another month she might have 3. We also went 
  over the results of her lab work. Unfortunately her cholesterol is still very 
  high. She does have a strong family history of heart disease on her father's 
  side. We talked about how high cholesterol can be hereditary and based on her 
  family history I think we should start medication. She has had a coronary 
  calcium scan in the past. I have suggested she might benefit from doing a Li
  feline screening. We will start atorvastatin at 20 mg. We briefly discussed 
  potential side effects and she will receive instructions from her pharmacist. 
  We decided to recheck her cholesterol after acouple of months and she is 
  encouraged to call if things or not going well.  
* Pt is here today for a 3 month check up, review labs. This note was generated 
  by using Dragon software. It may contain errors in wording, punctuate, or 
  spelling.  
* She is here today for follow-up. She has been taking her atorvastatin as 
  directed and we went over the results of her recent lab. I am extremely 
  pleased with the lowering of her cholesterol and her liver enzymes came back 
  normal. We will have her continue the medication. Her blood pressure is also e
  xcellent today and according to our scales she is lost a few pounds. We 
  conducted a review of systems and she is still struggling with chronic 
  headaches. We are reminded that she has had a diagnosis of migraine headaches 
  and she states that she also has some element of chronic neck pain especially 
  on the left side and she has also had problems with temporomandibular joint 
  dysfunction. She states she does wear a bite guard and she will be seeing her 
  dentist in the near future. We talked about various contributing factors to 
  headaches and we talked about some of its treatments. She states in the past 
  she was placed on 2 medications and they caused her to feel really groggy. She
   is pretty sureone of them was Topamax. We talked about doing an x-ray of her 
  neck and we talked about starting physical therapy as we do know that chronic 
  neck pain can be a source of headaches. We talked about possibly seeing an 
  oral surgeon for her TMJ or even injections. We talked about how sleep apnea 
  can cause chronic headaches. She expresses understanding and she would like to
   proceed with the x-ray. We will call her with the results. She states she is 
  also scheduled to get this years flu vaccine at work this month and she will 
  let us know when that occurs. We talked about seeing her back in 6 monthsfor a
   general checkup and sooner if any problems.  
Patient here today for annual exam. She states she quit taking her birth control
 about a month ago.She is concerned with depression, she is wanted you opinion 
on what would be best for her. She alsocomplains of itching for about 2 months 
externally tried over the counter but no relief. Last pap 2022 cotest 
negative. Had mammo scheduled today but had to reschedule due to conflict. LMP 
T HERE TODAY FOR FOLLOW UP BLOOD WORK AND U/S. PT STATES SHE DID HAVE 
PERIOD LAST MONTH AND THIS MONTH. PT HAS NO CONCERNS.Patient is here to review 
endometrial biopsy results.  
  
Reason for Referral  
  
  
                          Specialty    Diagnoses / Procedures Referred By Contac  
t Referred To Contact  
   
                                        Radiology             
  
  
Diagnoses  
  
  
Postmenopausal bleeding  
  
  
  
Procedures  
  
  
US pelvis transvaginal                    
  
  
Janie Parish DO  
  
  
 [facility]   
[place] [address]  
  
  
Phone: [phone]  
  
  
Fax: [phone]                         
  
  
  
  
  
                          Referral ID  Status       Reason       Start   
Date                                    Expiration   
Date                                    Visits   
Requested                               Visits   
Authorized  
   
                                [id number]          Authorized        
  
  
Perform   
Procedure       2023        1               1  
  
  
  
                          Specialty    Diagnoses / Procedures Referred By Contac  
t Referred To Contact  
   
                                        Primary Care          
  
  
Procedures  
  
  
Follow Up In Primary Care                 
  
  
Janie Parish DO  
  
  
 [facility]   
[place] [address]  
  
  
Phone: [phone]  
  
  
Fax: [phone]                         
  
  
  
  
  
                    Referral ID Status    Reason    Start Date Expiration Date V  
isits   
Requested                               Visits   
Authorized  
   
                [id number]  Authorized         2023 1       1  
  
  
  
                          Specialty    Diagnoses / Procedures Referred By Contac  
t Referred To Contact  
   
                                        Radiology             
  
  
Diagnoses  
  
  
Acute bronchitis, unspecified   
organism  
  
  
  
Procedures  
  
  
XR chest 2 views                          
  
  
Janie Parish DO  
  
  
 [facility]   
[place] [address]  
  
  
Phone: [phone]  
  
  
Fax: [phone]                         
  
  
  
  
  
                          Referral ID  Status       Reason       Start   
Date                                    Expiration   
Date                                    Visits   
Requested                               Visits   
Authorized  
   
                                [id number]         Authorized        
  
  
Perform   
Procedure       2024        1/3/2025        1               1  
  
  
  
Additional Source Comments  
  
  
  
                                                    INFORMATION SOURCE (unrecogn  
ized section and content)  
   
                                          
  
  
  
                                        DATE CREATED        AUTHOR  
   
                                2019                      [facility]  
  
  
  
                                DATE CREATED    AUTHOR          AUTHOR'S ORGANIZ  
ATION  
   
                                2023                      [facility]  
  
  
  
                                DATE CREATED    AUTHOR          AUTHOR'S ORGANIZ  
ATION  
   
                                09/10/2023                       [facility]  
  
  
  
                                DATE CREATED    AUTHOR          AUTHOR'S ORGANIZ  
ATION  
   
                                2023                      [facility]  
  
  
  
                                DATE CREATED    AUTHOR          AUTHOR'S ORGANIZ  
ATION  
   
                                2024                      [facility]  
latory  
  
  
  
                                DATE CREATED    AUTHOR          AUTHOR'S ORGANIZ  
ATION  
   
                                2024                      [facility]  
  
  
  
                                DATE CREATED    AUTHOR          AUTHOR'S ORGANIZ  
ATION  
   
                                2024                      [facility]  
  
  
  
                                DATE CREATED    AUTHOR          AUTHOR'S ORGANIZ  
ATION  
   
                                2024                      [facility]  
  
  
  
                                DATE CREATED    AUTHOR          AUTHOR'S ORGANIZ  
ATION  
   
                                2025                      [facility]  
  
  
  
  
  
                                                    Care Teams (unrecognized sec  
tion and content)  
   
                                          
  
  
  
                      Team Member Relationship Specialty  Start Date End Date  
   
                                                      
  
  
Janie Parish DO  
  
  
NPI: [id number]  
  
  
 [address]  
  
  
[facility] [place] [address]  
  
  
[phone] (Work)  
  
  
[phone] (Fax) PCP - General                   21          
  
  
  
                      Team Member Relationship Specialty  Start Date End Date  
   
                                                      
  
  
Janie Parish DO  
  
  
NPI: [id number]  
  
  
[address] [place] [address] [place] [address]  
  
  
[phone] (Work)  
  
  
[phone] (Fax) PCP - General                   21          
  
  
  
                      Team Member Relationship Specialty  Start Date End Date  
   
                                                      
  
  
Janie Parish DO  
  
  
NPI: [id number]  
  
  
[address] [place] [address] [facility] [place] [id number]  
  
  
[phone] (Work)  
  
  
[phone] (Fax) PCP - General                   21          
  
  
  
                      Team Member Relationship Specialty  Start Date End Date  
   
                                                      
  
  
Janie Parish DO  
  
  
NPI: [id number]  
  
  
[address] [place] [address] [place] [address]  
  
  
[phone] (Work)  
  
  
[phone] (Fax) PCP - General                   21          
  
  
  
                      Team Member Relationship Specialty  Start Date End Date  
   
                                                      
  
  
Janie Parish DO  
  
  
NPI: [id number]  
  
  
[address] [place] [address] [facility] [place] [address]  
  
  
[phone] (Work)  
  
  
[phone] (Fax) PCP - General                   21          
  
  
  
                      Team Member Relationship Specialty  Start Date End Date  
   
                                                      
  
  
Janie Parish DO  
  
  
NPI: [id number]  
  
  
[phone] (Work)  
  
  
[phone] (Fax) PCP - General                   21          
  
  
  
  
  
                                                    Reason for Visit (unrecogniz  
ed section and content)  
   
                                          
  
  
  
                                        Reason              Comments  
   
                                        Follow-up           Med refill  
  
  
  
                                        Reason              Comments  
   
                                        Menstrual Problem     
  
  
  
                                        Reason              Comments  
   
                                        Surgery Orders        
  
  
  
                                        Reason              Comments  
   
                                        URI                 C/o sinus drainage,   
congestion and cough x 1 week  
  
  
  
                                        Reason              Comments  
   
                                        Vaginal Bleeding      
  
  
  
                                        Reason              Comments  
   
                                        Menstrual Problem   PMB  
  
  
  
                                        Reason              Comments  
   
                                        Patient Update        
  
  
  
                                        Reason              Comments  
   
                                        Discussion          Discuss hysterectomy  
  
  
  
                                        Reason              Comments  
   
                                        Heavy Bleeding        
  
  
  
  
  
                                                    Source Comments (unrecognize  
d section and content)  
   
                                                    In the event this informatio  
n is protected by the Federal Confidentiality of   
Alcohol   
and Drug Abuse Patient Records regulations: This information has been disclosed 
to   
you from records protected by Federal confidentiality rules (42 CFR Part 2). The
   
Federal rules prohibit you from making any further disclosure of this 
information   
unless further disclosure is expressly permitted by the written consent of the 
person   
to whom it pertains or as otherwise permitted by 42 CFR Part 2. A general   
authorization for the release of medical or other information is NOT sufficient 
for   
this purpose. The Federal rules restrict any use of the information to 
criminally   
investigate or prosecute any alcohol or drug abuse patient.[facility] HospitalIn 
the   
event this information is protected by the Federal Confidentiality of Alcohol 
and   
Drug Abuse Patient Records regulations: This information has been disclosed to 
you   
from records protected by Federal confidentiality rules (42 CFR Part 2). The 
Federal   
rules prohibit you from making any further disclosure of this information unless
   
further disclosure is expressly permitted by the written consent of the person 
to   
whom it pertains or as otherwise permitted by 42 CFR Part 2. A general 
authorization   
for the release of medical or other information is NOT sufficient for this 
purpose.   
The Federal rules restrict any use of the information to criminally investigate 
or   
prosecute any alcohol or drug abuse patient.[facility]In the event this   
information is protected by the Federal Confidentiality of Alcohol and Drug 
Abuse   
Patient Records regulations: This information has been disclosed to you from 
records   
protected by Federal confidentiality rules (42 CFR Part 2). The Federal rules   
prohibit you from making any further disclosure of this information unless 
further   
disclosure is expressly permitted by the written consent of the person to whom 
it   
pertains or as otherwise permitted by 42 CFR Part 2. A general authorization for
 the   
release of medical or other information is NOT sufficient for this purpose. The   
Federal rules restrict any use of the information to criminally investigate or   
prosecute any alcohol or drug abuse patient.[facility] HospitalIn the event this   
information is protected by the Federal Confidentiality of Alcohol and Drug 
Abuse   
Patient Records regulations: This information has been disclosed to you from 
records   
protected by Federal confidentiality rules (42 CFR Part 2). The Federal rules   
prohibit you from making any further disclosure of this information unless 
further   
disclosure is expressly permitted by the written consent of the person to whom 
it   
pertains or as otherwise permitted by 42 CFR Part 2. A general authorization for
 the   
release of medical or other information is NOT sufficient for this purpose. The   
Federal rules restrict any use of the information to criminally investigate or   
prosecute any alcohol or drug abuse patient.[facility] HospitalIn the event this   
information is protected by the Federal Confidentiality of Alcohol and Drug 
Abuse   
Patient Records regulations: This information has been disclosed to you from 
records   
protected by Federal confidentiality rules (42 CFR Part 2). The Federal rules   
prohibit you from making any further disclosure of this information unless 
further   
disclosure is expressly permitted by the written consent of the person to whom 
it   
pertains or as otherwise permitted by 42 CFR Part 2. A general authorization for
 the   
release of medical or other information is NOT sufficient for this purpose. The   
Federal rules restrict any use of the information to criminally investigate or   
prosecute any alcohol or drug abuse patient.[facility] HospitalIn the event this   
information is protected by the Federal Confidentiality of Alcohol and Drug 
Abuse   
Patient Records regulations: This information has been disclosed to you from 
records   
protected by Federal confidentiality rules (42 CFR Part 2). The Federal rules   
prohibit you from making any further disclosure of this information unless 
further   
disclosure is expressly permitted by the written consent of the person to whom 
it   
pertains or as otherwise permitted by 42 CFR Part 2. A general authorization for
 the   
release of medical or other information is NOT sufficient for this purpose. The   
Federal rules restrict any use of the information to criminally investigate or   
prosecute any alcohol or drug abuse patient.[facility] HospitalIn the event this   
information is protected by the Federal Confidentiality of Alcohol and Drug 
Abuse   
Patient Records regulations: This information has been disclosed to you from 
records   
protected by Federal confidentiality rules (42 CFR Part 2). The Federal rules   
prohibit you from making any further disclosure of this information unless 
further   
disclosure is expressly permitted by the written consent of the person to whom 
it   
pertains or as otherwise permitted by 42 CFR Part 2. A general authorization for
 the   
release of medical or other information is NOT sufficient for this purpose. The   
Federal rules restrict any use of the information to criminally investigate or   
prosecute any alcohol or drug abuse patient.[facility] HospitalIn the event this   
information is protected by the Federal Confidentiality of Alcohol and Drug 
Abuse   
Patient Records regulations: This information has been disclosed to you from 
records   
protected by Federal confidentiality rules (42 CFR Part 2). The Federal rules   
prohibit you from making any further disclosure of this information unless 
further   
disclosure is expressly permitted by the written consent of the person to whom 
it   
pertains or as otherwise permitted by 42 CFR Part 2. A general authorization for
 the   
release of medical or other information is NOT sufficient for this purpose. The   
Federal rules restrict any use of the information to criminally investigate or   
prosecute any alcohol or drug abuse patient.[facility] HospitalIn the event this   
information is protected by the Federal Confidentiality of Alcohol and Drug 
Abuse   
Patient Records regulations: This information has been disclosed to you from 
records   
protected by Federal confidentiality rules (42 CFR Part 2). The Federal rules   
prohibit you from making any further disclosure of this information unless 
further   
disclosure is expressly permitted by the written consent of the person to whom 
it   
pertains or as otherwise permitted by 42 CFR Part 2. A general authorization for
 the   
release of medical or other information is NOT sufficient for this purpose. The   
Federal rules restrict any use of the information to criminally investigate or   
prosecute any alcohol or drug abuse patient.[facility] HospitalIn the event this   
information is protected by the Federal Confidentiality of Alcohol and Drug 
Abuse   
Patient Records regulations: This information has been disclosed to you from 
records   
protected by Federal confidentiality rules (42 CFR Part 2). The Federal rules   
prohibit you from making any further disclosure of this information unless 
further   
disclosure is expressly permitted by the written consent of the person to whom 
it   
pertains or as otherwise permitted by 42 CFR Part 2. A general authorization for
 the   
release of medical or other information is NOT sufficient for this purpose. The   
Federal rules restrict any use of the information to criminally investigate or   
prosecute any alcohol or drug abuse patient.[facility] HospitalIn the event this   
information is protected by the Federal Confidentiality of Alcohol and Drug 
Abuse   
Patient Records regulations: This information has been disclosed to you from 
records   
protected by Federal confidentiality rules (42 CFR Part 2). The Federal rules   
prohibit you from making any further disclosure of this information unless 
further   
disclosure is expressly permitted by the written consent of the person to whom 
it   
pertains or as otherwise permitted by 42 CFR Part 2. A general authorization for
 the   
release of medical or other information is NOT sufficient for this purpose. The   
Federal rules restrict any use of the information to criminally investigate or   
prosecute any alcohol or drug abuse patient.[facility] HospitalIn the event this   
information is protected by the Federal Confidentiality of Alcohol and Drug 
Abuse   
Patient Records regulations: This information has been disclosed to you from 
records   
protected by Federal confidentiality rules (42 CFR Part 2). The Federal rules   
prohibit you from making any further disclosure of this information unless 
further   
disclosure is expressly permitted by the written consent of the person to whom 
it   
pertains or as otherwise permitted by 42 CFR Part 2. A general authorization for
 the   
release of medical or other information is NOT sufficient for this purpose. The   
Federal rules restrict any use of the information to criminally investigate or   
prosecute any alcohol or drug abuse patient.[facility] HospitalIn the event this   
information is protected by the Federal Confidentiality of Alcohol and Drug 
Abuse   
Patient Records regulations: This information has been disclosed to you from 
records   
protected by Federal confidentiality rules (42 CFR Part 2). The Federal rules   
prohibit you from making any further disclosure of this information unless 
further   
disclosure is expressly permitted by the written consent of the person to whom 
it   
pertains or as otherwise permitted by 42 CFR Part 2. A general authorization for
 the   
release of medical or other information is NOT sufficient for this purpose. The   
Federal rules restrict any use of the information to criminally investigate or   
prosecute any alcohol or drug abuse patient.[facility] HospitalIn the event this   
information is protected by the Federal Confidentiality of Alcohol and Drug 
Abuse   
Patient Records regulations: This information has been disclosed to you from 
records   
protected by Federal confidentiality rules (42 CFR Part 2). The Federal rules   
prohibit you from making any further disclosure of this information unless 
further   
disclosure is expressly permitted by the written consent of the person to whom 
it   
pertains or as otherwise permitted by 42 CFR Part 2. A general authorization for
 the   
release of medical or other information is NOT sufficient for this purpose. The   
Federal rules restrict any use of the information to criminally investigate or   
prosecute any alcohol or drug abuse patient.[facility] HospitalIn the event this   
information is protected by the Federal Confidentiality of Alcohol and Drug 
Abuse   
Patient Records regulations: This information has been disclosed to you from 
records   
protected by Federal confidentiality rules (42 CFR Part 2). The Federal rules   
prohibit you from making any further disclosure of this information unless 
further   
disclosure is expressly permitted by the written consent of the person to whom 
it   
pertains or as otherwise permitted by 42 CFR Part 2. A general authorization for
 the   
release of medical or other information is NOT sufficient for this purpose. The   
Federal rules restrict any use of the information to criminally investigate or   
prosecute any alcohol or drug abuse patient.[facility]  
  
FOR RECORDS PERTAINING TO PATIENTS WHO ARE OR HAVE BEEN ENROLLED IN A CHEMICAL 
DEPENDENCY/SUBSTANCEABUSE PROGRAM, SOME INFORMATION MAY BE OMITTED. This 
clinical summary was aggregated from multiple sources. Caution should be 
exercised in using it in the provision of clinical care. This summary normalizes
 information from multiple sources, and as a consequence, information in this 
document may materially change the coding, format and clinical context of 
patient data. In addition, data may be omitted in some cases. CLINICAL DECISIONS
 SHOULD BE BASED ON THE PRIMARY CLINICAL RECORDS. [facility] [employer] [facility]. provides
 no warranty or guarantee of the accuracy or completeness of information in this
 document.

## 2025-01-30 NOTE — PRE.ANES_ITS
ASA Classification*    
ASA Classification    
ASA Classification: 3    
    
Assessment & Plan Anesthesia*    
Anesthesia Assessment    
Anesthesia Assessment:     
    
Discussed sedation and/or anesthesia options, risks, benefits, and alternatives   
with patient/parents/legal guardian/POA. Questions invited.     
    
The patient/parents/legal guardian/POA seems to understand and agrees to proceed  
with anesthesia plan.    
    
Reviewed the physical assessment, medical history, allergy history and patient   
home medications list prior to surgery/procedure/anesthetic and documented any   
changes.    
    
Performed airway and anesthesia risk assessments.    
    
Anesthesia Type    
Anesthesia Type: General    
History Source    
History Obtained from:: Patient and Chart    
    
Anesthesia Focused Assessment*    
Temperature: 98.4 F    
Pulse Rate: 67    
Blood Pressure: 137/68    
Respiratory Rate: 16    
Pulse Ox: 100    
Oxygen Delivery Method: Room Air    
Airway Assessment    
Mouth opens: 2 cm    
Mallampati Score: III    
Teeth Condition: Caps/Crowns (Left lower molar has a crown.  It is tight.)    
Neck Range of motion (ROM): Full ROM    
    
Focused Labs    
Anesthesia Preop lab:     
    
    
                                                    *** CBC ***    
     
                WBC             7.5 K/mm3 (4.4-11.0)  10/18/24 16:08  10/18/24    
     
                RBC             5.00 M/mm3 (4.2-5.4)  10/18/24 16:08  10/18/24    
     
                Hgb             13.6 g/dL (12.0-15.0)  10/18/24 16:08  10/18/24    
     
                Hct             42.6 % (37-47)  10/18/24 16:08  10/18/24    
     
                Plt Count       317 K/mm3 (150-450)  10/18/24 16:08  10/18/24    
     
                                                    *** CHEMISTRY ***    
     
                Potassium       4.0 mmol/L (3.5-5.1)  10/18/24 16:08  10/18/24    
     
                Sodium          138 mmol/L (136-145)  10/18/24 16:08  10/18/24    
     
                Magnesium       2.5 mg/dL (1.6-2.6)  25 08:25    
     
                BUN             13 mg/dL (7-18)  10/18/24 16:08  10/18/24    
     
                Creatinine      0.75 mg/dL (0.55-1.02)  10/18/24 16:08  10/18/24    
     
                Glucose         82 mg/dL ()  10/18/24 16:08  10/18/24    
     
                POC Glucose     106 mg/dL ()  25 08:06  25    
     
                                                    *** COAG ***    
     
                                                    *** PREGNANCY ***    
     
                Urine Pregnancy Test Negative Negative 25 08:02  25    
    
    
    
Pre-Assessment    
Diagnosis/Proposed Procedure    
Planned Operative Procedure(s): (B) Hysterectomy,LAVH, bilateral salpingo   
oophorectomy, possible cystoscopy    
Anesthesia History    
Anesthesia History - RN Documentation:     
                      Anesthesia History - RN Documentation    
    
    
    
Hx Hospitalization             No                           25 14:17    
     
Any Problems With Anesthesia   No                           25 14:17    
     
Cholinesterase deficiency      No                           25 14:17    
     
You/Your Family Experience     No                           25 14:17    
    
fever (hyperthermia) with           
     
Relationship                        
     
Recent Exposure to Contagious  No                           25 08:17    
    
Disease                             
     
Does patient have nerve        No                           25 14:17    
    
stimulator                          
     
Patient instructed to have          
    
device shut off                     
     
--Does patient have Pacemaker  No                           25 08:18    
    
or ICD?                             
     
When Was Last Pacemaker Check       
    
    
    
*** QUESTION #4 FULL TEXT: You/Your Family Experience fever (hyperthermia) with   
Anesthesia    
    
Last Oral Intake    
Last Oral intake:     
                                Last Oral Intake    
    
    
    
NPO since                      06:00                        25 08:18    
     
Meds taken in AM with sips of  No                           25 08:18    
    
water?                              
     
Meds patient instructed to          
    
take am of surgery                  
    
    
    
    
Any additional information?: Yes NPO since: 06:00 (Patient took her preop Ensure  
at 6 AM.)    
PONV    
PONV - RN Documentation:     
                             PONV - RN Documentation    
    
    
    
Female                         Yes                          25 14:17    
     
HX of Motion Sickness          Yes                          25 14:17    
     
HX of N/V After Surgery        No                           25 14:17    
     
Non-Smoker                     Yes                          25 14:17    
     
Duration of Surgery greater    Yes                          25 14:17    
    
than 60 minutes                     
     
Number of Risk Factors         4                            25 14:17    
     
PONV Score                     Severe Risk                  25 14:17    
    
    
    
    
Height & Weight    
Height & Weight:     
                           Anesthesia: Height & Weight    
    
    
    
Height                         5 ft 3 in                    25 08:18    
     
Weight:                        111.584 kg                   25 08:18    
     
Body Mass Index (BMI)          43.5                         25 08:18    
    
    
    
    
Respiratory Assessment    
Respiratory Assessment - RN Documentation:     
                Respiratory Tract Infection Hx - RN Documentation    
    
    
    
Hx Respiratory Tract Infection No                           25 14:17    
    
    
    
    
STOP Sleep Apnea    
STOP Sleep Apnea - RN Documentation:     
                       STOP Sleep Apnea - RN Documentation    
    
    
    
Hx Hypertension                No                           25 14:17    
     
Hx Sleep Apnea                 No                           25 14:17    
     
CPAP                                
     
BIPAP                               
     
Do you snore loudly (louder    No                           25 14:17    
    
than talking or can be heard        
     
Do you often feel tired/       No                           25 14:17    
    
fatigued/ sleepy during             
    
daytime?                            
     
Has anyone observed you stop   No                           25 14:17    
    
breathing during sleep?             
     
STOP Results                   Negative                     25 14:17    
    
    
    
    
*** QUESTION #5 FULL TEXT : Do you snore loudly (louder than talking or can be   
heard through closed doors)?     
    
Tobacco Use History    
Tobacco Use History - RN Documentation:     
                     Tobacco Use History - RN Documentation    
    
    
    
Tobacco Use                         
     
Smoking Status                 Never smoker                 25 14:17    
     
Hx Tobacco Use                 No                           25 14:17    
     
Years Smoking                       
     
Packs Smoked per Day                
     
Smoking Cessation Date was          
    
within the last 15 years            
     
Hx Smoking Cessation Date           
     
Hx Smoking Cessation                
    
Counseling                          
    
    
    
    
Hematologic Medial History    
Hematologic Hx - RN Documentation:     
                    Hematologic Medical Hx - RN documentation    
    
    
    
Hx of Blood Transfusion        No                           25 14:17    
     
Hx of Transfusion in last 3    No                           25 14:17    
    
Months                              
     
Date of Last Transfusion (if        
    
within last 3 months)               
     
Ever experience any problems   No                           25 14:17    
    
with transfusion(s)?                
     
Specify any problems                
     
Hx of Preganancy in last 3     N/A                          25 14:17    
    
Months                              
     
Nurse Filling Out Transfusion  NBUCHER                      25 14:17    
    
& Pregnancy Questions:              
     
Date:                          25 14:17    
     
Time:                          14:18                        25 14:17    
     
Patient unable to answer at         
    
this time (ie. confused,            
    
unrespo                             
    
    
    
    
Pregnancy/Reproduction History    
Pregnancy/Reproductive History - RN Documentation:     
                   Pregnancy/Reproductive Hx- RN Documentation    
    
    
    
Hx Pregnant Now                No                           25 14:17    
     
Gestational Age (in weeks):         
     
EDC:                                
     
Hx                           
     
Hx Para                             
     
Hx  Section                 
     
SAB                                 
     
Breastfeeding                  No                           25 14:17    
    
    
    
    
Active Medications    
Active Medications:     
Current Medications    
    
    
    
Generic Name Dose Route Start Last Admin    
    
  Trade Name Freq  PRN Reason Stop Dose Admin    
     
Lactated Ringer's  1,000 mls @ 40 mls/hr  25 07:30     
    
  IV      
    
  .Q25H ELEANOR      
     
Lactated Ringer's  1,000 mls @ 40 mls/hr  25 08:30     
    
  IV      
    
  .Q25H ELEANOR      
     
Sodium Chloride  1,000 mls @ 15 mls/hr  25 08:30  25 08:31    
    
  IV  25 21:49  15 mls/hr    
    
  .Q48H ELEANOR   Administration    
    
  Protocol      
     
Insulin Human Lispro  0 unit  25 07:30     
    
  Insulin Lispro 100 Unit/Ml Insuln.Pen  SC  25 13:30     
    
  Q4H PRN PRN      
    
  BG >/= 180, SEE PROTOCOL      
    
  Protocol      
    
    
    
    
PFSH    
Medical History (Reviewed 25 @ 09:38 by Dr. Alok Goel MD)    
    
Wears glasses    
Depression    
High cholesterol    
Migraine headache    
Non-smoker    
History of kidney stones    
    
    
                                Home Medications    
    
    
    
?Medication ?Instructions ?Recorded ?Last Taken ?Type    
     
                          aripiprazole 2 mg tablet  2 mg PO DAILY 10/17/24 01/29    
/25 06:30 History    
     
                          atorvastatin 20 mg tablet 20 mg PO DAILY 10/17/24 01/2    
9/25 06:30 History    
     
                          sertraline 100 mg tablet  200 mg PO DAILY 10/17/24 01/    
29/25 06:30 History    
     
                          norethindrone acetate 5 mg tablet 5 mg PO DAILY 25 06:30 History    
     
                          magnesium citrate 100 mg tablet 100 mg PO DAILY 25 06:30 History    
    
    
    
                                            
    
    
    
Allergy/AdvReac Type Severity Reaction Status Date / Time    
     
tree nut Allergy Severe Anaphylaxis Verified 25 08:11    
    
    
    
Surgical History (Reviewed 25 @ 09:38 by Dr. Alok Goel MD)    
    
History of D&C (10/24/24)    
History of appendectomy (~)    
    
    
Social History (Reviewed 25 @ 09:38 by Dr. Alok Goel MD)    
Smoking Status:  Never smoker     
    
    
    
Review of Systems (Anesthesia)    
ROS Narrative    
System reviewed and no additional complaints, except as documented.

## 2025-01-30 NOTE — DCINST_ITS
Discharge Instructions    
Diet    
Discharge Diet: Light diet - advance as tolerated    
DC O2, CPAP, BIPAP needs    
Home O2 Discharge instructions: No    
Dressing / Incision    
Return to work on:: 02/24/25    
May shower in (days): 1    
May resume sexual activity in: 6-8 weeks and - (Nothing in your vagina for 6   
weeks.  No vaginal or anal intercourse for 6-8 weeks)    
Dressing / Incision    
Call your doctor if your incision/area has: Continuous Slow Oozing, Sudden   
Increased Bleeding and Foul Smelling Discharge    
Call your doctor if you observe: Fever of 101 or Higher and Using more than 1   
pad per hour    
Cleanse incision/area with: Soap & Water and - (Your incisions have skin glue,   
it can get wet, leave the glue on until it falls off.  )    
Follow Up Care    
Please Follow Up With: Radha Acosta MD    
When: With my office in 1-2 and 6 weeks or as needed.  [phone]    
Test Results:     
Test results from this visit will be discussed in further detail at your follow-  
up appointment, if applicable.    
    
    
Discharge Plan    
Admission    
Primary Reason for Your Visit: Hysterectomy    
    
Attending Provider: Radha Acosta    
    
Primary Care Provider: Janie Parish    
    
Instructions    
Print Language: English    
    
Discharge Orders/Prescriptions    
Prescriptions:    
New    
  ibuprofen 600 mg tablet     
   600 mg PO Q6H PRN (Reason: Pain) 20 Days Qty: 40 0RF    
  oxycodone 5 mg tablet     
   5 mg PO Q8H PRN PRN (Reason: severe pain) 7 Days Qty: 6 0RF    
    
Continued    
  atorvastatin 20 mg tablet     
   20 mg PO DAILY     
  sertraline 100 mg tablet     
   200 mg PO DAILY     
  aripiprazole 2 mg tablet     
   2 mg PO DAILY     
  magnesium citrate 100 mg tablet     
   100 mg PO DAILY     
    
Discontinued    
  norethindrone acetate 5 mg tablet     
   5 mg PO DAILY     
    
Other Ambulatory Orders:    
12 Lead EKG  (Routine)    
   Location: None Selected    
   Ordered By:  Dr. Alvarado Martinez    
    
Referrals / Follow Up:    
Janie Parish MD [Primary Care Provider] -     
    
Disposition    
Disposition (needs filled in before D/C Order can be placed): Home, Self Care

## 2025-02-07 ENCOUNTER — APPOINTMENT (OUTPATIENT)
Age: 55
End: 2025-02-07
Payer: COMMERCIAL

## 2025-02-13 LAB
ANION GAP SERPL CALCULATED.4IONS-SCNC: 10 MMOL/L (CALC) (ref 7–17)
BUN SERPL-MCNC: 17 MG/DL (ref 7–25)
BUN/CREAT SERPL: ABNORMAL (CALC) (ref 6–22)
CALCIUM SERPL-MCNC: 9.3 MG/DL (ref 8.6–10.4)
CHLORIDE SERPL-SCNC: 103 MMOL/L (ref 98–110)
CHOLEST SERPL-MCNC: 190 MG/DL
CHOLEST/HDLC SERPL: 4.3 (CALC)
CO2 SERPL-SCNC: 27 MMOL/L (ref 20–32)
CREAT SERPL-MCNC: 0.91 MG/DL (ref 0.5–1.03)
EGFRCR SERPLBLD CKD-EPI 2021: 74 ML/MIN/1.73M2
GLUCOSE SERPL-MCNC: 100 MG/DL (ref 65–99)
HDLC SERPL-MCNC: 44 MG/DL
LDLC SERPL CALC-MCNC: 121 MG/DL (CALC)
NONHDLC SERPL-MCNC: 146 MG/DL (CALC)
POTASSIUM SERPL-SCNC: 4.4 MMOL/L (ref 3.5–5.3)
SODIUM SERPL-SCNC: 140 MMOL/L (ref 135–146)
TRIGL SERPL-MCNC: 140 MG/DL

## 2025-02-14 ENCOUNTER — OFFICE VISIT (OUTPATIENT)
Age: 55
End: 2025-02-14
Payer: COMMERCIAL

## 2025-02-14 VITALS
SYSTOLIC BLOOD PRESSURE: 132 MMHG | HEART RATE: 79 BPM | HEIGHT: 63 IN | BODY MASS INDEX: 42.84 KG/M2 | OXYGEN SATURATION: 96 % | WEIGHT: 241.8 LBS | DIASTOLIC BLOOD PRESSURE: 84 MMHG

## 2025-02-14 DIAGNOSIS — Z12.31 ENCOUNTER FOR SCREENING MAMMOGRAM FOR MALIGNANT NEOPLASM OF BREAST: Primary | ICD-10-CM

## 2025-02-14 DIAGNOSIS — R73.9 HYPERGLYCEMIA: ICD-10-CM

## 2025-02-14 DIAGNOSIS — F32.0 DEPRESSION, MAJOR, SINGLE EPISODE, MILD (CMS-HCC): ICD-10-CM

## 2025-02-14 DIAGNOSIS — E78.2 MIXED HYPERLIPIDEMIA: ICD-10-CM

## 2025-02-14 DIAGNOSIS — F41.9 ANXIETY: ICD-10-CM

## 2025-02-14 PROCEDURE — 1036F TOBACCO NON-USER: CPT | Performed by: INTERNAL MEDICINE

## 2025-02-14 PROCEDURE — 99214 OFFICE O/P EST MOD 30 MIN: CPT | Performed by: INTERNAL MEDICINE

## 2025-02-14 PROCEDURE — 3008F BODY MASS INDEX DOCD: CPT | Performed by: INTERNAL MEDICINE

## 2025-02-14 RX ORDER — ATORVASTATIN CALCIUM 20 MG/1
20 TABLET, FILM COATED ORAL DAILY
Qty: 90 TABLET | Refills: 1 | Status: CANCELLED | OUTPATIENT
Start: 2025-02-14

## 2025-02-14 RX ORDER — ARIPIPRAZOLE 2 MG/1
2 TABLET ORAL DAILY
Qty: 100 TABLET | Refills: 1 | Status: SHIPPED | OUTPATIENT
Start: 2025-02-14

## 2025-02-14 RX ORDER — ATORVASTATIN CALCIUM 40 MG/1
40 TABLET, FILM COATED ORAL DAILY
Qty: 100 TABLET | Refills: 1 | Status: SHIPPED | OUTPATIENT
Start: 2025-02-14 | End: 2026-03-21

## 2025-02-14 RX ORDER — SERTRALINE HYDROCHLORIDE 100 MG/1
200 TABLET, FILM COATED ORAL DAILY
Qty: 200 TABLET | Refills: 1 | Status: SHIPPED | OUTPATIENT
Start: 2025-02-14

## 2025-02-14 ASSESSMENT — ENCOUNTER SYMPTOMS
VOMITING: 0
PALPITATIONS: 0
BLOOD IN STOOL: 0
COUGH: 0
NAUSEA: 0
SHORTNESS OF BREATH: 0
ABDOMINAL PAIN: 0
BACK PAIN: 0
ARTHRALGIAS: 0
FATIGUE: 0
WHEEZING: 0
DIARRHEA: 0

## 2025-02-14 ASSESSMENT — PATIENT HEALTH QUESTIONNAIRE - PHQ9
1. LITTLE INTEREST OR PLEASURE IN DOING THINGS: NOT AT ALL
2. FEELING DOWN, DEPRESSED OR HOPELESS: NOT AT ALL
SUM OF ALL RESPONSES TO PHQ9 QUESTIONS 1 AND 2: 0

## 2025-02-14 NOTE — ASSESSMENT & PLAN NOTE
-Her lipid profile had improved from last year but we did decide to tweak the dose of her atorvastatin to get better control of her cholesterol.  She will go from 20 mg daily up to 40 mg daily and we will check her cholesterol again just prior to her next follow-up visit in 6 months

## 2025-02-14 NOTE — PROGRESS NOTES
Subjective   Patient ID: Rebecca Friedman is a 54 y.o. female who presents for Follow-up (6 MO CK).  HPI  She is here today for her general 6-month checkup.  She is looking well.  Since our last visit she had to have a D&C and then a hysterectomy.  She had her surgery 2 weeks ago and did remarkably well.  Unfortunately she started to experience some postmenopausal symptoms and she will be seeing her specialist in the near future to discuss her postoperative healing and possible intervention for her symptoms.  We did conduct a full review of systems and we also went over the results of recent lab work.  Her kidney profile is excellent.  Her cholesterol improved but is still not within the desirable range so we decided to tweak the dose of her atorvastatin and I am confident that her next checkup will be very good.  She also has been doing very well on her antidepressants.  She does admit the last 2 weeks have been trying but overall she has done well with the combination.  Her blood glucose was slightly raised and we will continue to check periodically.  She also missed her last mammogram because of her surgery and we will help get her scheduled to get back on track.  Review of Systems   Constitutional:  Negative for fatigue.   Respiratory:  Negative for cough, shortness of breath and wheezing.    Cardiovascular:  Negative for chest pain, palpitations and leg swelling.   Gastrointestinal:  Negative for abdominal pain, blood in stool, diarrhea, nausea and vomiting.   Musculoskeletal:  Negative for arthralgias and back pain.     Objective   Physical Exam  Vitals and nursing note reviewed.   Constitutional:       General: She is not in acute distress.     Appearance: Normal appearance.   HENT:      Head: Normocephalic and atraumatic.   Eyes:      Conjunctiva/sclera: Conjunctivae normal.   Cardiovascular:      Rate and Rhythm: Normal rate and regular rhythm.      Heart sounds: Normal heart sounds.   Pulmonary:       Effort: No respiratory distress.      Breath sounds: No wheezing.   Abdominal:      Palpations: Abdomen is soft.      Tenderness: There is no abdominal tenderness. There is no guarding.   Musculoskeletal:         General: No swelling. Normal range of motion.   Skin:     General: Skin is warm and dry.   Neurological:      General: No focal deficit present.      Mental Status: She is alert and oriented to person, place, and time.   Psychiatric:         Behavior: Behavior normal.       Recent Results (from the past 4 weeks)   Lipid Panel    Collection Time: 02/12/25  8:48 AM   Result Value Ref Range    CHOLESTEROL, TOTAL 190 <200 mg/dL    HDL CHOLESTEROL 44 (L) > OR = 50 mg/dL    TRIGLYCERIDES 140 <150 mg/dL    LDL-CHOLESTEROL 121 (H) mg/dL (calc)    CHOL/HDLC RATIO 4.3 <5.0 (calc)    NON HDL CHOLESTEROL 146 (H) <130 mg/dL (calc)   Basic Metabolic Panel    Collection Time: 02/12/25  8:48 AM   Result Value Ref Range    GLUCOSE 100 (H) 65 - 99 mg/dL    UREA NITROGEN (BUN) 17 7 - 25 mg/dL    CREATININE 0.91 0.50 - 1.03 mg/dL    EGFR 74 > OR = 60 mL/min/1.73m2    BUN/CREATININE RATIO SEE NOTE: 6 - 22 (calc)    SODIUM 140 135 - 146 mmol/L    POTASSIUM 4.4 3.5 - 5.3 mmol/L    CHLORIDE 103 98 - 110 mmol/L    CARBON DIOXIDE 27 20 - 32 mmol/L    ELECTROLYTE BALANCE 10 7 - 17 mmol/L (calc)    CALCIUM 9.3 8.6 - 10.4 mg/dL       Assessment/Plan   Problem List Items Addressed This Visit             ICD-10-CM    Depression, major, single episode, mild (CMS-HCC) F32.0     -Doing well on her current medical regimen and we will continue to monitor         Relevant Medications    ARIPiprazole (Abilify) 2 mg tablet    sertraline (Zoloft) 100 mg tablet    Hyperglycemia R73.9     -We will continue to monitor her blood glucose levels         Relevant Orders    Basic Metabolic Panel    Hemoglobin A1C    Mixed hyperlipidemia E78.2     -Her lipid profile had improved from last year but we did decide to tweak the dose of her atorvastatin to get  better control of her cholesterol.  She will go from 20 mg daily up to 40 mg daily and we will check her cholesterol again just prior to her next follow-up visit in 6 months         Relevant Medications    atorvastatin (Lipitor) 40 mg tablet    Other Relevant Orders    Lipid Panel    Anxiety F41.9     -Doing well on her current medical regimen         Relevant Medications    sertraline (Zoloft) 100 mg tablet    Encounter for screening mammogram for malignant neoplasm of breast - Primary Z12.31    Relevant Orders    BI mammo bilateral screening tomosynthesis   Patient instructions  As we discussed I am pleased with your checkup today but we have decided to change the dose of your atorvastatin so that now you are taking 40 mg a day.  If you have any 20 mg tablets left over you can simply double those until they are gone.  Your new prescription will be at the pharmacy waiting on you  Please remember to try to schedule your mammogram and they do offer Saturday morning mammograms  We would like to see you back in 6 months and please remember to go for lab work approximately 5 days prior to your follow-up visit       Sandy Huff, DO

## 2025-02-14 NOTE — PATIENT INSTRUCTIONS
Patient instructions  As we discussed I am pleased with your checkup today but we have decided to change the dose of your atorvastatin so that now you are taking 40 mg a day.  If you have any 20 mg tablets left over you can simply double those until they are gone.  Your new prescription will be at the pharmacy waiting on you  Please remember to try to schedule your mammogram and they do offer Saturday morning mammograms  We would like to see you back in 6 months and please remember to go for lab work approximately 5 days prior to your follow-up visit

## 2025-02-18 ENCOUNTER — HOSPITAL ENCOUNTER (OUTPATIENT)
Dept: RADIOLOGY | Facility: CLINIC | Age: 55
Discharge: HOME | End: 2025-02-18
Payer: COMMERCIAL

## 2025-02-18 DIAGNOSIS — Z12.31 ENCOUNTER FOR SCREENING MAMMOGRAM FOR MALIGNANT NEOPLASM OF BREAST: ICD-10-CM

## 2025-02-18 PROCEDURE — 77067 SCR MAMMO BI INCL CAD: CPT

## 2025-02-18 PROCEDURE — 77063 BREAST TOMOSYNTHESIS BI: CPT | Performed by: RADIOLOGY

## 2025-02-18 PROCEDURE — 77067 SCR MAMMO BI INCL CAD: CPT | Performed by: RADIOLOGY

## 2025-02-28 ENCOUNTER — APPOINTMENT (OUTPATIENT)
Age: 55
End: 2025-02-28
Payer: COMMERCIAL

## 2025-03-06 ENCOUNTER — APPOINTMENT (OUTPATIENT)
Age: 55
End: 2025-03-06
Payer: COMMERCIAL

## 2025-04-08 DIAGNOSIS — F41.9 ANXIETY: ICD-10-CM

## 2025-04-08 DIAGNOSIS — F32.0 DEPRESSION, MAJOR, SINGLE EPISODE, MILD: ICD-10-CM

## 2025-07-07 DIAGNOSIS — F41.9 ANXIETY: ICD-10-CM

## 2025-07-07 DIAGNOSIS — F32.0 DEPRESSION, MAJOR, SINGLE EPISODE, MILD: ICD-10-CM

## 2025-07-07 RX ORDER — SERTRALINE HYDROCHLORIDE 100 MG/1
200 TABLET, FILM COATED ORAL DAILY
Qty: 180 TABLET | Refills: 3 | OUTPATIENT
Start: 2025-07-07

## 2025-07-07 RX ORDER — SERTRALINE HYDROCHLORIDE 100 MG/1
200 TABLET, FILM COATED ORAL DAILY
Qty: 200 TABLET | Refills: 0 | Status: SHIPPED | OUTPATIENT
Start: 2025-07-07

## 2025-08-13 LAB
ANION GAP SERPL CALCULATED.4IONS-SCNC: 10 MMOL/L (CALC) (ref 7–17)
BUN SERPL-MCNC: 18 MG/DL (ref 7–25)
BUN/CREAT SERPL: ABNORMAL (CALC) (ref 6–22)
CALCIUM SERPL-MCNC: 9.3 MG/DL (ref 8.6–10.4)
CHLORIDE SERPL-SCNC: 105 MMOL/L (ref 98–110)
CHOLEST SERPL-MCNC: 148 MG/DL
CHOLEST/HDLC SERPL: 3 (CALC)
CO2 SERPL-SCNC: 24 MMOL/L (ref 20–32)
CREAT SERPL-MCNC: 0.85 MG/DL (ref 0.5–1.03)
EGFRCR SERPLBLD CKD-EPI 2021: 80 ML/MIN/1.73M2
EST. AVERAGE GLUCOSE BLD GHB EST-MCNC: 134 MG/DL
EST. AVERAGE GLUCOSE BLD GHB EST-SCNC: 7.4 MMOL/L
GLUCOSE SERPL-MCNC: 115 MG/DL (ref 65–99)
HBA1C MFR BLD: 6.3 %
HDLC SERPL-MCNC: 49 MG/DL
LDLC SERPL CALC-MCNC: 83 MG/DL (CALC)
NONHDLC SERPL-MCNC: 99 MG/DL (CALC)
POTASSIUM SERPL-SCNC: 4.5 MMOL/L (ref 3.5–5.3)
SODIUM SERPL-SCNC: 139 MMOL/L (ref 135–146)
TRIGL SERPL-MCNC: 75 MG/DL

## 2025-08-14 DIAGNOSIS — E78.2 MIXED HYPERLIPIDEMIA: ICD-10-CM

## 2025-08-14 DIAGNOSIS — R73.9 HYPERGLYCEMIA: ICD-10-CM

## 2025-08-21 ENCOUNTER — APPOINTMENT (OUTPATIENT)
Age: 55
End: 2025-08-21
Payer: COMMERCIAL

## 2025-09-04 ENCOUNTER — APPOINTMENT (OUTPATIENT)
Age: 55
End: 2025-09-04
Payer: COMMERCIAL

## 2025-09-04 PROBLEM — R73.03 PREDIABETES: Status: ACTIVE | Noted: 2025-09-04

## 2025-09-04 PROBLEM — N95.0 POSTMENOPAUSAL BLEEDING: Status: RESOLVED | Noted: 2023-08-09 | Resolved: 2025-09-04

## 2025-09-04 PROBLEM — Z12.31 ENCOUNTER FOR SCREENING MAMMOGRAM FOR MALIGNANT NEOPLASM OF BREAST: Status: RESOLVED | Noted: 2025-02-14 | Resolved: 2025-09-04

## 2025-09-04 PROBLEM — R73.9 HYPERGLYCEMIA: Status: RESOLVED | Noted: 2023-02-09 | Resolved: 2025-09-04

## 2025-09-04 ASSESSMENT — ENCOUNTER SYMPTOMS
DIARRHEA: 0
ARTHRALGIAS: 0
PALPITATIONS: 0
COUGH: 0
NAUSEA: 0
WHEEZING: 0
SHORTNESS OF BREATH: 0
ABDOMINAL PAIN: 0
FATIGUE: 0
BACK PAIN: 0
BLOOD IN STOOL: 0
VOMITING: 0

## 2025-09-04 ASSESSMENT — PATIENT HEALTH QUESTIONNAIRE - PHQ9
1. LITTLE INTEREST OR PLEASURE IN DOING THINGS: NOT AT ALL
SUM OF ALL RESPONSES TO PHQ9 QUESTIONS 1 AND 2: 0
2. FEELING DOWN, DEPRESSED OR HOPELESS: NOT AT ALL

## 2025-12-09 ENCOUNTER — APPOINTMENT (OUTPATIENT)
Age: 55
End: 2025-12-09
Payer: COMMERCIAL